# Patient Record
Sex: FEMALE | Race: BLACK OR AFRICAN AMERICAN | NOT HISPANIC OR LATINO | Employment: FULL TIME | RURAL
[De-identification: names, ages, dates, MRNs, and addresses within clinical notes are randomized per-mention and may not be internally consistent; named-entity substitution may affect disease eponyms.]

---

## 2021-03-06 ENCOUNTER — HISTORICAL (OUTPATIENT)
Dept: ADMINISTRATIVE | Facility: HOSPITAL | Age: 66
End: 2021-03-06

## 2021-03-18 ENCOUNTER — HOSPITAL ENCOUNTER (OUTPATIENT)
Dept: RADIOLOGY | Facility: HOSPITAL | Age: 66
Discharge: HOME | End: 2021-03-18
Attending: OBSTETRICS & GYNECOLOGY
Payer: COMMERCIAL

## 2021-03-18 DIAGNOSIS — Z12.31 BREAST CANCER SCREENING BY MAMMOGRAM: ICD-10-CM

## 2021-03-18 PROCEDURE — 77067 SCR MAMMO BI INCL CAD: CPT | Mod: TC

## 2022-02-16 ENCOUNTER — HOSPITAL ENCOUNTER (OUTPATIENT)
Dept: RADIOLOGY | Facility: HOSPITAL | Age: 67
Discharge: HOME | End: 2022-02-16
Attending: ORTHOPAEDIC SURGERY
Payer: COMMERCIAL

## 2022-02-16 DIAGNOSIS — M25.511 ACUTE PAIN OF RIGHT SHOULDER: ICD-10-CM

## 2022-02-16 DIAGNOSIS — M25.522 LEFT ELBOW PAIN: ICD-10-CM

## 2022-02-16 PROBLEM — M72.2 PLANTAR FASCIITIS, BILATERAL: Status: ACTIVE | Noted: 2022-02-16

## 2022-02-16 PROCEDURE — 73030 X-RAY EXAM OF SHOULDER: CPT | Mod: TC,RT

## 2022-02-16 PROCEDURE — 73070 X-RAY EXAM OF ELBOW: CPT | Mod: TC,LT

## 2022-03-23 PROBLEM — M75.22 BICEPS TENDINITIS ON LEFT: Status: ACTIVE | Noted: 2022-03-23

## 2022-03-23 PROBLEM — M75.41 IMPINGEMENT SYNDROME OF RIGHT SHOULDER: Status: ACTIVE | Noted: 2022-03-23

## 2022-05-03 ENCOUNTER — HOSPITAL ENCOUNTER (OUTPATIENT)
Facility: HOSPITAL | Age: 67
Discharge: HOME | End: 2022-05-03
Attending: ORTHOPAEDIC SURGERY | Admitting: ORTHOPAEDIC SURGERY
Payer: COMMERCIAL

## 2022-05-03 ENCOUNTER — ANESTHESIA EVENT (OUTPATIENT)
Dept: SURGERY | Facility: HOSPITAL | Age: 67
End: 2022-05-03
Payer: COMMERCIAL

## 2022-05-03 ENCOUNTER — ANESTHESIA (OUTPATIENT)
Dept: SURGERY | Facility: HOSPITAL | Age: 67
End: 2022-05-03
Payer: COMMERCIAL

## 2022-05-03 VITALS
OXYGEN SATURATION: 91 % | SYSTOLIC BLOOD PRESSURE: 167 MMHG | DIASTOLIC BLOOD PRESSURE: 60 MMHG | HEIGHT: 61 IN | WEIGHT: 200.38 LBS | RESPIRATION RATE: 16 BRPM | BODY MASS INDEX: 37.83 KG/M2 | HEART RATE: 76 BPM | TEMPERATURE: 98 F

## 2022-05-03 DIAGNOSIS — M75.41 IMPINGEMENT SYNDROME OF RIGHT SHOULDER: Primary | ICD-10-CM

## 2022-05-03 PROCEDURE — 63600175 PHARM REV CODE 636 W HCPCS: Performed by: ORTHOPAEDIC SURGERY

## 2022-05-03 PROCEDURE — 71000033 HC RECOVERY, INTIAL HOUR: Performed by: ORTHOPAEDIC SURGERY

## 2022-05-03 PROCEDURE — 25000003 PHARM REV CODE 250

## 2022-05-03 PROCEDURE — 36000711: Performed by: ORTHOPAEDIC SURGERY

## 2022-05-03 PROCEDURE — 25000003 PHARM REV CODE 250: Performed by: ORTHOPAEDIC SURGERY

## 2022-05-03 PROCEDURE — 36000710: Performed by: ORTHOPAEDIC SURGERY

## 2022-05-03 PROCEDURE — 37000008 HC ANESTHESIA 1ST 15 MINUTES: Performed by: ORTHOPAEDIC SURGERY

## 2022-05-03 PROCEDURE — 63600175 PHARM REV CODE 636 W HCPCS: Performed by: ANESTHESIOLOGY

## 2022-05-03 PROCEDURE — 64415 NJX AA&/STRD BRCH PLXS IMG: CPT | Mod: XU,RT,, | Performed by: ANESTHESIOLOGY

## 2022-05-03 PROCEDURE — D9220A PRA ANESTHESIA: Mod: ANES,,, | Performed by: ANESTHESIOLOGY

## 2022-05-03 PROCEDURE — D9220A PRA ANESTHESIA: ICD-10-PCS | Mod: ANES,,, | Performed by: ANESTHESIOLOGY

## 2022-05-03 PROCEDURE — 37000009 HC ANESTHESIA EA ADD 15 MINS: Performed by: ORTHOPAEDIC SURGERY

## 2022-05-03 PROCEDURE — 63600175 PHARM REV CODE 636 W HCPCS

## 2022-05-03 PROCEDURE — 27100168 OPTIME MED/SURG SUP & DEVICES NON-STERILE SUPPLY: Performed by: ORTHOPAEDIC SURGERY

## 2022-05-03 PROCEDURE — C1713 ANCHOR/SCREW BN/BN,TIS/BN: HCPCS | Performed by: ORTHOPAEDIC SURGERY

## 2022-05-03 PROCEDURE — 71000015 HC POSTOP RECOV 1ST HR: Performed by: ORTHOPAEDIC SURGERY

## 2022-05-03 PROCEDURE — 64415 PERIPHERAL BLOCK: ICD-10-PCS | Mod: XU,RT,, | Performed by: ANESTHESIOLOGY

## 2022-05-03 PROCEDURE — D9220A PRA ANESTHESIA: ICD-10-PCS | Mod: CRNA,,,

## 2022-05-03 PROCEDURE — D9220A PRA ANESTHESIA: Mod: CRNA,,,

## 2022-05-03 PROCEDURE — 71000016 HC POSTOP RECOV ADDL HR: Performed by: ORTHOPAEDIC SURGERY

## 2022-05-03 PROCEDURE — 27201423 OPTIME MED/SURG SUP & DEVICES STERILE SUPPLY: Performed by: ORTHOPAEDIC SURGERY

## 2022-05-03 DEVICE — IMP SUTURE ANCHOR SWVLKC CLD 4.75X19.1MM: Type: IMPLANTABLE DEVICE | Site: SHOULDER | Status: FUNCTIONAL

## 2022-05-03 RX ORDER — MORPHINE SULFATE 8 MG/ML
4 INJECTION INTRAMUSCULAR; INTRAVENOUS; SUBCUTANEOUS EVERY 5 MIN PRN
Status: DISCONTINUED | OUTPATIENT
Start: 2022-05-03 | End: 2022-05-03 | Stop reason: HOSPADM

## 2022-05-03 RX ORDER — CEFAZOLIN SODIUM 2 G/50ML
2 SOLUTION INTRAVENOUS
Status: DISCONTINUED | OUTPATIENT
Start: 2022-05-03 | End: 2022-05-03 | Stop reason: HOSPADM

## 2022-05-03 RX ORDER — ONDANSETRON 2 MG/ML
4 INJECTION INTRAMUSCULAR; INTRAVENOUS DAILY PRN
Status: DISCONTINUED | OUTPATIENT
Start: 2022-05-03 | End: 2022-05-03 | Stop reason: HOSPADM

## 2022-05-03 RX ORDER — LIDOCAINE HYDROCHLORIDE 10 MG/ML
1 INJECTION, SOLUTION EPIDURAL; INFILTRATION; INTRACAUDAL; PERINEURAL ONCE
Status: CANCELLED | OUTPATIENT
Start: 2022-05-03 | End: 2022-05-03

## 2022-05-03 RX ORDER — DIPHENHYDRAMINE HYDROCHLORIDE 50 MG/ML
25 INJECTION INTRAMUSCULAR; INTRAVENOUS EVERY 6 HOURS PRN
Status: DISCONTINUED | OUTPATIENT
Start: 2022-05-03 | End: 2022-05-03 | Stop reason: HOSPADM

## 2022-05-03 RX ORDER — ROCURONIUM BROMIDE 10 MG/ML
INJECTION, SOLUTION INTRAVENOUS
Status: DISCONTINUED | OUTPATIENT
Start: 2022-05-03 | End: 2022-05-03

## 2022-05-03 RX ORDER — DEXAMETHASONE SODIUM PHOSPHATE 4 MG/ML
INJECTION, SOLUTION INTRA-ARTICULAR; INTRALESIONAL; INTRAMUSCULAR; INTRAVENOUS; SOFT TISSUE
Status: DISCONTINUED | OUTPATIENT
Start: 2022-05-03 | End: 2022-05-03

## 2022-05-03 RX ORDER — KETOROLAC TROMETHAMINE 30 MG/ML
INJECTION, SOLUTION INTRAMUSCULAR; INTRAVENOUS
Status: DISCONTINUED | OUTPATIENT
Start: 2022-05-03 | End: 2022-05-03

## 2022-05-03 RX ORDER — ONDANSETRON 2 MG/ML
INJECTION INTRAMUSCULAR; INTRAVENOUS
Status: DISCONTINUED | OUTPATIENT
Start: 2022-05-03 | End: 2022-05-03

## 2022-05-03 RX ORDER — PROMETHAZINE HYDROCHLORIDE 25 MG/1
25 TABLET ORAL EVERY 6 HOURS PRN
Status: DISCONTINUED | OUTPATIENT
Start: 2022-05-03 | End: 2022-05-03 | Stop reason: HOSPADM

## 2022-05-03 RX ORDER — FENTANYL CITRATE 50 UG/ML
INJECTION, SOLUTION INTRAMUSCULAR; INTRAVENOUS
Status: DISCONTINUED | OUTPATIENT
Start: 2022-05-03 | End: 2022-05-03

## 2022-05-03 RX ORDER — EPHEDRINE SULFATE 50 MG/ML
INJECTION, SOLUTION INTRAVENOUS
Status: DISCONTINUED | OUTPATIENT
Start: 2022-05-03 | End: 2022-05-03

## 2022-05-03 RX ORDER — NIFEDIPINE 30 MG/1
30 TABLET, EXTENDED RELEASE ORAL DAILY
COMMUNITY

## 2022-05-03 RX ORDER — SODIUM CHLORIDE, SODIUM LACTATE, POTASSIUM CHLORIDE, CALCIUM CHLORIDE 600; 310; 30; 20 MG/100ML; MG/100ML; MG/100ML; MG/100ML
125 INJECTION, SOLUTION INTRAVENOUS CONTINUOUS
Status: DISCONTINUED | OUTPATIENT
Start: 2022-05-03 | End: 2022-05-03 | Stop reason: HOSPADM

## 2022-05-03 RX ORDER — EPINEPHRINE CONVENIENCE KIT 1 MG/ML(1)
KIT INTRAMUSCULAR; SUBCUTANEOUS
Status: DISCONTINUED | OUTPATIENT
Start: 2022-05-03 | End: 2022-05-03 | Stop reason: HOSPADM

## 2022-05-03 RX ORDER — LIDOCAINE HYDROCHLORIDE 20 MG/ML
INJECTION, SOLUTION EPIDURAL; INFILTRATION; INTRACAUDAL; PERINEURAL
Status: DISCONTINUED | OUTPATIENT
Start: 2022-05-03 | End: 2022-05-03

## 2022-05-03 RX ORDER — SODIUM CHLORIDE 9 MG/ML
INJECTION, SOLUTION INTRAVENOUS CONTINUOUS
Status: DISCONTINUED | OUTPATIENT
Start: 2022-05-03 | End: 2022-05-03 | Stop reason: HOSPADM

## 2022-05-03 RX ORDER — HYDROMORPHONE HYDROCHLORIDE 2 MG/ML
0.5 INJECTION, SOLUTION INTRAMUSCULAR; INTRAVENOUS; SUBCUTANEOUS EVERY 5 MIN PRN
Status: DISCONTINUED | OUTPATIENT
Start: 2022-05-03 | End: 2022-05-03 | Stop reason: HOSPADM

## 2022-05-03 RX ORDER — ROPIVACAINE HYDROCHLORIDE 7.5 MG/ML
INJECTION, SOLUTION EPIDURAL; PERINEURAL
Status: COMPLETED | OUTPATIENT
Start: 2022-05-03 | End: 2022-05-03

## 2022-05-03 RX ORDER — ONDANSETRON 4 MG/1
8 TABLET, ORALLY DISINTEGRATING ORAL EVERY 8 HOURS PRN
Status: DISCONTINUED | OUTPATIENT
Start: 2022-05-03 | End: 2022-05-03 | Stop reason: HOSPADM

## 2022-05-03 RX ORDER — GLYCOPYRROLATE 0.2 MG/ML
INJECTION INTRAMUSCULAR; INTRAVENOUS
Status: DISCONTINUED | OUTPATIENT
Start: 2022-05-03 | End: 2022-05-03

## 2022-05-03 RX ORDER — BUPIVACAINE HYDROCHLORIDE 2.5 MG/ML
INJECTION, SOLUTION EPIDURAL; INFILTRATION; INTRACAUDAL
Status: DISCONTINUED | OUTPATIENT
Start: 2022-05-03 | End: 2022-05-03 | Stop reason: HOSPADM

## 2022-05-03 RX ORDER — NEOSTIGMINE METHYLSULFATE 1 MG/ML
INJECTION, SOLUTION INTRAVENOUS
Status: DISCONTINUED | OUTPATIENT
Start: 2022-05-03 | End: 2022-05-03

## 2022-05-03 RX ORDER — MEPERIDINE HYDROCHLORIDE 25 MG/ML
25 INJECTION INTRAMUSCULAR; INTRAVENOUS; SUBCUTANEOUS EVERY 10 MIN PRN
Status: DISCONTINUED | OUTPATIENT
Start: 2022-05-03 | End: 2022-05-03 | Stop reason: HOSPADM

## 2022-05-03 RX ORDER — PENTAZOCINE HYDROCHLORIDE AND NALOXONE HYDROCHLORIDE 50; .5 MG/1; MG/1
1 TABLET ORAL EVERY 6 HOURS PRN
Status: DISCONTINUED | OUTPATIENT
Start: 2022-05-03 | End: 2022-05-03 | Stop reason: HOSPADM

## 2022-05-03 RX ORDER — PROPOFOL 10 MG/ML
VIAL (ML) INTRAVENOUS
Status: DISCONTINUED | OUTPATIENT
Start: 2022-05-03 | End: 2022-05-03

## 2022-05-03 RX ORDER — SODIUM CHLORIDE, SODIUM LACTATE, POTASSIUM CHLORIDE, CALCIUM CHLORIDE 600; 310; 30; 20 MG/100ML; MG/100ML; MG/100ML; MG/100ML
INJECTION, SOLUTION INTRAVENOUS CONTINUOUS
Status: CANCELLED | OUTPATIENT
Start: 2022-05-03

## 2022-05-03 RX ORDER — MIDAZOLAM HYDROCHLORIDE 1 MG/ML
INJECTION INTRAMUSCULAR; INTRAVENOUS
Status: DISCONTINUED | OUTPATIENT
Start: 2022-05-03 | End: 2022-05-03

## 2022-05-03 RX ORDER — CEFAZOLIN SODIUM 1 G/3ML
INJECTION, POWDER, FOR SOLUTION INTRAMUSCULAR; INTRAVENOUS
Status: DISCONTINUED | OUTPATIENT
Start: 2022-05-03 | End: 2022-05-03

## 2022-05-03 RX ORDER — PHENYLEPHRINE HYDROCHLORIDE 10 MG/ML
INJECTION INTRAVENOUS
Status: DISCONTINUED | OUTPATIENT
Start: 2022-05-03 | End: 2022-05-03

## 2022-05-03 RX ADMIN — GLYCOPYRROLATE 0.8 MG: 0.2 INJECTION INTRAMUSCULAR; INTRAVENOUS at 12:05

## 2022-05-03 RX ADMIN — PHENYLEPHRINE HYDROCHLORIDE 100 MCG: 10 INJECTION INTRAVENOUS at 10:05

## 2022-05-03 RX ADMIN — ROCURONIUM BROMIDE 50 MG: 10 INJECTION, SOLUTION INTRAVENOUS at 10:05

## 2022-05-03 RX ADMIN — ROPIVACAINE HYDROCHLORIDE 30 ML: 7.5 INJECTION, SOLUTION EPIDURAL; PERINEURAL at 10:05

## 2022-05-03 RX ADMIN — KETOROLAC TROMETHAMINE 60 MG: 30 INJECTION, SOLUTION INTRAMUSCULAR at 10:05

## 2022-05-03 RX ADMIN — SODIUM CHLORIDE: 9 INJECTION, SOLUTION INTRAVENOUS at 08:05

## 2022-05-03 RX ADMIN — PROPOFOL 150 MG: 10 INJECTION, EMULSION INTRAVENOUS at 10:05

## 2022-05-03 RX ADMIN — NEOSTIGMINE METHYLSULFATE 4 MG: 1 INJECTION INTRAVENOUS at 12:05

## 2022-05-03 RX ADMIN — FENTANYL CITRATE 100 MCG: 50 INJECTION INTRAMUSCULAR; INTRAVENOUS at 10:05

## 2022-05-03 RX ADMIN — DEXAMETHASONE SODIUM PHOSPHATE 8 MG: 4 INJECTION, SOLUTION INTRA-ARTICULAR; INTRALESIONAL; INTRAMUSCULAR; INTRAVENOUS; SOFT TISSUE at 10:05

## 2022-05-03 RX ADMIN — EPHEDRINE SULFATE 25 MG: 50 INJECTION INTRAVENOUS at 11:05

## 2022-05-03 RX ADMIN — LIDOCAINE HYDROCHLORIDE 100 MG: 20 INJECTION, SOLUTION INTRAVENOUS at 10:05

## 2022-05-03 RX ADMIN — ONDANSETRON 4 MG: 2 INJECTION INTRAMUSCULAR; INTRAVENOUS at 10:05

## 2022-05-03 RX ADMIN — SODIUM CHLORIDE: 9 INJECTION, SOLUTION INTRAVENOUS at 10:05

## 2022-05-03 RX ADMIN — CEFAZOLIN 2 G: 1 INJECTION, POWDER, FOR SOLUTION INTRAMUSCULAR; INTRAVENOUS; PARENTERAL at 10:05

## 2022-05-03 RX ADMIN — MIDAZOLAM 2 MG: 1 INJECTION INTRAMUSCULAR; INTRAVENOUS at 10:05

## 2022-05-03 NOTE — H&P
[facility] Orthopedic Periop Services  Orthopedics  H&P    Patient Name: Isaiah Neil  MRN: [id number]  Admission Date: (Not on file)  Primary Care Provider: Chrissy Powell DO (Inactive)    Patient information was obtained from patient and ER records.     Subjective:     Principal Problem:Impingement syndrome of right shoulder    Chief Complaint: No chief complaint on file.       HPI: Chief complaint:  Right shoulder pain  History:    Isaiah Neil is a 66 y.o. female seen  for recheck of her right shoulder pain.  She underwent MRI examination of the right shoulder on 03/10/2022.  MRI suggests impingement syndrome with AC joint DJD and partial-thickness tearing of the supraspinatus and infraspinatus tendons.  Discussed the option for arthroscopic subacromial decompression as an outpatient.  Procedure was shown with models.  The benefits and risks of surgery outlined to include but not limited to bleeding, infection, damage to blood vessels and nerves, need for further surgery, other risks and complications including even death and the patient wished to proceed.   Impression:  Impingement syndrome-right shoulder  Plan:  Right shoulder arthroscopic subacromial decompression.  Outpatient general/regional block anesthesia discussed.      No new subjective & objective note has been filed under this hospital service since the last note was generated.    Assessment/Plan:     No notes have been filed under this hospital service.  Service: Orthopedic Surgery      Davis Dunn MD  Orthopedics  [facility] Orthopedic Periop Services

## 2022-05-03 NOTE — DISCHARGE SUMMARY
[facility] - Orthopedic Periop Services  Discharge Note  Short Stay    Procedure(s) (LRB):  ARTHROSCOPY, SHOULDER (Right)    OUTCOME: Patient tolerated treatment/procedure well without complication and is now ready for discharge.    DISPOSITION: Home or Self Care    FINAL DIAGNOSIS:  Impingement syndrome of right shoulder    FOLLOWUP: In clinic    DISCHARGE INSTRUCTIONS:    Discharge Procedure Orders   Diet general     Keep surgical extremity elevated     Ice to affected area   Order Comments: using barrier between ice and skin (specify duration&frequency)     Remove dressing in 72 hours   Order Comments: Keep dressing in place for 72 hours     Change dressing (specify)   Order Comments: Dressing change: one time per day beginning 72 hours post op.     Call MD for:  temperature >100.4     Call MD for:  persistent nausea and vomiting     Call MD for:  severe uncontrolled pain     Call MD for:  difficulty breathing, headache or visual disturbances     Call MD for:  redness, tenderness, or signs of infection (pain, swelling, redness, odor or green/yellow discharge around incision site)     Call MD for:  hives     Call MD for:  persistent dizziness or light-headedness     Call MD for:  extreme fatigue     Activity as tolerated     Shower on day dressing removed (No bath)     Weight bearing as tolerated        TIME SPENT ON DISCHARGE: 15 minutes

## 2022-05-03 NOTE — HPI
Chief complaint:  Right shoulder pain  History:    Isaiah Neil is a 66 y.o. female seen  for recheck of her right shoulder pain.  She underwent MRI examination of the right shoulder on 03/10/2022.  MRI suggests impingement syndrome with AC joint DJD and partial-thickness tearing of the supraspinatus and infraspinatus tendons.  Discussed the option for arthroscopic subacromial decompression as an outpatient.  Procedure was shown with models.  The benefits and risks of surgery outlined to include but not limited to bleeding, infection, damage to blood vessels and nerves, need for further surgery, other risks and complications including even death and the patient wished to proceed.   Impression:  Impingement syndrome-right shoulder  Plan:  Right shoulder arthroscopic subacromial decompression.  Outpatient general/regional block anesthesia discussed.

## 2022-05-03 NOTE — ANESTHESIA PREPROCEDURE EVALUATION
05/03/2022  Isaiah Neil is a 66 y.o., female.      Pre-op Assessment    I have reviewed the Patient Summary Reports.     I have reviewed the Nursing Notes. I have reviewed the NPO Status.   I have reviewed the Medications.     Review of Systems  Anesthesia Hx:  No problems with previous Anesthesia    Social:  Non-Smoker, No Alcohol Use    Hematology/Oncology:  Hematology Normal   Oncology Normal     EENT/Dental:EENT/Dental Normal   Cardiovascular:   Hypertension Dysrhythmias atrial fibrillation hyperlipidemia    Pulmonary:  Pulmonary Normal    Renal/:  Renal/ Normal     Hepatic/GI:  Hepatic/GI Normal    Musculoskeletal:  Musculoskeletal Normal    Neurological:   CVA    Endocrine:   Hypothyroidism    Dermatological:  Skin Normal    Psych:  Psychiatric Normal           Physical Exam  General: Well nourished    Airway:  Mallampati: III / III  Mouth Opening: Normal  TM Distance: > 6 cm  Tongue: Normal  Neck ROM: Normal ROM    Chest/Lungs:  Clear to auscultation, Normal Respiratory Rate    Heart:  Rate: Normal  Rhythm: Regular Rhythm        Anesthesia Plan  Type of Anesthesia, risks & benefits discussed:    Anesthesia Type: Gen ETT  Intra-op Monitoring Plan: Standard ASA Monitors  Post Op Pain Control Plan: multimodal analgesia  Induction:  IV  Informed Consent: Informed consent signed with the Patient and all parties understand the risks and agree with anesthesia plan.  All questions answered. Patient consented to blood products? Yes  ASA Score: 3  Day of Surgery Review of History & Physical: H&P Update referred to the surgeon/provider.I have interviewed and examined the patient. I have reviewed the patient's H&P dated: There are no significant changes. H&P completed by Anesthesiologist.    Ready For Surgery From Anesthesia Perspective.     .

## 2022-05-03 NOTE — ANESTHESIA PROCEDURE NOTES
Intubation    Date/Time: 5/3/2022 10:45 AM  Performed by: Modesto Quiñones II, CRNA  Authorized by: Ralph Taylor MD     Intubation:     Induction:  Intravenous    Intubated:  Postinduction    Mask Ventilation:  Easy with oral airway    Attempts:  1    Attempted By:  CRNA    Method of Intubation:  Direct    Blade:  Yusuf 3    Laryngeal View Grade: Grade IIA - cords partially seen      Difficult Airway Encountered?: No      Complications:  None    Airway Device:  Oral endotracheal tube    Airway Device Size:  7.0    Style/Cuff Inflation:  Cuffed    Inflation Amount (mL):  7    Tube secured:  21    Secured at:  The lips    Placement Verified By:  Capnometry    Complicating Factors:  None    Findings Post-Intubation:  BS equal bilateral and atraumatic/condition of teeth unchanged

## 2022-05-03 NOTE — INTERVAL H&P NOTE
The patient has been examined and the H&P has been reviewed:    I concur with the findings and no changes have occurred since H&P was written.    Surgery risks, benefits and alternative options discussed and understood by patient/family.          Active Hospital Problems    Diagnosis  POA    *Impingement syndrome of right shoulder [M75.41]  Yes      Resolved Hospital Problems   No resolved problems to display.

## 2022-05-03 NOTE — ANESTHESIA PROCEDURE NOTES
Peripheral Block    Patient location during procedure: OR   Block not for primary anesthetic.  Reason for block: at surgeon's request and post-op pain management   Post-op Pain Location: rt shoulder scope   Start time: 5/3/2022 10:30 AM  Timeout: 5/3/2022 10:30 AM   End time: 5/3/2022 10:37 AM    Staffing  Authorizing Provider: Ralph Taylor MD  Performing Provider: Ralph Taylor MD    Preanesthetic Checklist  Completed: patient identified, IV checked, site marked, risks and benefits discussed, surgical consent, monitors and equipment checked, pre-op evaluation and timeout performed  Peripheral Block  Patient position: sitting  Prep: ChloraPrep  Patient monitoring: heart rate, continuous pulse ox, continuous capnometry, frequent blood pressure checks and cardiac monitor  Block type: interscalene  Laterality: right  Injection technique: single shot  Needle  Needle type: Stimuplex   Needle gauge: 20 G  Needle length: 2 in  Needle localization: nerve stimulator and ultrasound guidance   -ultrasound image captured on disc.  Assessment  Injection assessment: negative aspiration, negative parasthesia and local visualized surrounding nerve  Paresthesia pain: none  Heart rate change: no  Slow fractionated injection: yes  Pain Tolerance: comfortable throughout block  Medications:    Medications: ROPIvacaine (NAROPIN) injection 0.75% - Perineural   30 mL - 5/3/2022 10:34:00 AM

## 2022-05-03 NOTE — BRIEF OP NOTE
[facility] - Orthopedic Periop Services  Brief Operative Note    Surgery Date: 5/3/2022     Surgeon(s) and Role:     * Davis Dunn MD - Primary    Assisting Surgeon: None    Pre-op Diagnosis:  Impingement syndrome of right shoulder [M75.41]    Post-op Diagnosis:  Post-Op Diagnosis Codes:     * Impingement syndrome of right shoulder [M75.41]    Procedure(s) (LRB):  ARTHROSCOPY, SHOULDER (Right)    Anesthesia:  General/block    Description of the findings of the procedure(s): See Op Note     Estimated Blood Loss: * No values recorded between 5/3/2022 12:00 PM and 5/3/2022 12:46 PM *5cc         Specimens:   Specimen (24h ago, onward)            None            Discharge Note    OUTCOME: Patient tolerated treatment/procedure well without complication and is now ready for discharge.    DISPOSITION: Home or Self Care    FINAL DIAGNOSIS:  Impingement syndrome of right shoulder    FOLLOWUP: In clinic    DISCHARGE INSTRUCTIONS:    Discharge Procedure Orders   Diet general     Keep surgical extremity elevated     Ice to affected area   Order Comments: using barrier between ice and skin (specify duration&frequency)     Remove dressing in 72 hours   Order Comments: Keep dressing in place for 72 hours     Change dressing (specify)   Order Comments: Dressing change: one time per day beginning 72 hours post op.     Call MD for:  temperature >100.4     Call MD for:  persistent nausea and vomiting     Call MD for:  severe uncontrolled pain     Call MD for:  difficulty breathing, headache or visual disturbances     Call MD for:  redness, tenderness, or signs of infection (pain, swelling, redness, odor or green/yellow discharge around incision site)     Call MD for:  hives     Call MD for:  persistent dizziness or light-headedness     Call MD for:  extreme fatigue     Activity as tolerated     Shower on day dressing removed (No bath)     Weight bearing as tolerated

## 2022-05-03 NOTE — H&P
[facility] - Orthopedic Periop Services  Orthopedics  H&P    Patient Name: Isaiah Neil  MRN: [id number]  Admission Date: (Not on file)  Primary Care Provider: Chrissy Powell DO (Inactive)    Patient information was obtained from patient and ER records.     Subjective:     Principal Problem:Impingement syndrome of right shoulder    Chief Complaint: No chief complaint on file.       HPI: Chief complaint:  Right shoulder pain  History:    Isaiah Neil is a 66 y.o. female seen  for recheck of her right shoulder pain.  She underwent MRI examination of the right shoulder on 03/10/2022.  MRI suggests impingement syndrome with AC joint DJD and partial-thickness tearing of the supraspinatus and infraspinatus tendons.  Discussed the option for arthroscopic subacromial decompression as an outpatient.  Procedure was shown with models.  The benefits and risks of surgery outlined to include but not limited to bleeding, infection, damage to blood vessels and nerves, need for further surgery, other risks and complications including even death and the patient wished to proceed.   Impression:  Impingement syndrome-right shoulder  Plan:  Right shoulder arthroscopic subacromial decompression.  Outpatient general/regional block anesthesia discussed.      History reviewed. No pertinent past medical history.    History reviewed. No pertinent surgical history.    Review of patient's allergies indicates:   Allergen Reactions    Codeine phosphate Nausea Only       No current facility-administered medications for this encounter.     Current Outpatient Medications   Medication Sig    amLODIPine (NORVASC) 10 MG tablet     aspirin 325 MG tablet 1 tablet    atorvastatin (LIPITOR) 40 MG tablet     EUTHYROX 112 mcg tablet     metoprolol succinate (TOPROL-XL) 50 MG 24 hr tablet      Family History       Problem Relation (Age of Onset)    Breast cancer Maternal Aunt          Tobacco Use    Smoking status: Not on file    Smokeless  tobacco: Not on file   Substance and Sexual Activity    Alcohol use: Not on file    Drug use: Not on file    Sexual activity: Not on file     Review of Systems   Constitutional: Negative.   Objective:     Vital Signs (Most Recent):    Vital Signs (24h Range):  BP: ()/()   Arterial Line BP: ()/()            There is no height or weight on file to calculate BMI.    No intake or output data in the 24 hours ending 05/02/22 2222    General    Constitutional: She is oriented to person, place, and time. She appears well-developed and well-nourished.   HENT:   Head: Normocephalic and atraumatic.   Eyes: EOM are normal. Pupils are equal, round, and reactive to light.   Neck: Neck supple.   Cardiovascular:  Normal rate, regular rhythm and normal heart sounds.            Pulmonary/Chest: Effort normal and breath sounds normal.   Abdominal: Soft. Bowel sounds are normal.   Neurological: She is alert and oriented to person, place, and time.   Psychiatric: She has a normal mood and affect. Her behavior is normal.         Right Shoulder Exam     Tenderness   The patient is tender to palpation of the acromioclavicular joint, greater tuberosity and acromion.    Range of Motion   Active abduction:  normal   Passive abduction:  normal   Extension:  normal   Forward Flexion:  normal   Adduction: normal    Tests & Signs   Impingement: positive  Rotator Cuff Painful Arc/Range: moderate    Left Shoulder Exam   Left shoulder exam is normal.       Muscle Strength   Right Upper Extremity   Shoulder Abduction: 4/5   Shoulder Internal Rotation: 5/5   Shoulder External Rotation: 5/5   Supraspinatus: 4/5   Subscapularis: 5/5   Biceps: 5/5     Vascular Exam     Right Pulses      Radial:                    2+      Capillary Refill  Right Hand: normal capillary refill        Significant Labs: All pertinent labs within the past 24 hours have been reviewed.    Significant Imaging: I have reviewed all pertinent imaging  results/findings.    Assessment/Plan:     No notes have been filed under this hospital service.  Service: Orthopedic Surgery      Davis Dunn MD  Orthopedics  [facility] - Orthopedic Periop Services

## 2022-05-03 NOTE — TRANSFER OF CARE
"Anesthesia Transfer of Care Note    Patient: Isaiah Neil    Procedure(s) Performed: Procedure(s) (LRB):  ARTHROSCOPY, SHOULDER (Right)    Patient location: PACU    Anesthesia Type: general and regional    Transport from OR: Transported from OR on 6-10 L/min O2 by face mask with adequate spontaneous ventilation    Post pain: adequate analgesia    Post assessment: no apparent anesthetic complications    Post vital signs: stable    Level of consciousness: responds to stimulation and sedated    Nausea/Vomiting: no nausea/vomiting    Complications: none    Transfer of care protocol was followed      Last vitals:   Visit Vitals  BP (!) 174/70 (BP Location: Left arm, Patient Position: Lying)   Pulse 86   Temp 36.4 °C (97.6 °F) (Oral)   Resp 20   Ht 5' 1" (1.549 m)   Wt 90.9 kg (200 lb 6.4 oz)   SpO2 97%   Breastfeeding No   BMI 37.87 kg/m²     "

## 2022-05-03 NOTE — OP NOTE
[facility] - Orthopedic Periop Services  Surgery Department  Operative Note    SUMMARY     Date of Procedure: 5/3/2022     Procedure: Procedure(s) (LRB):  ARTHROSCOPY, SHOULDER (Right)     Surgeon(s) and Role:     * Davis Dunn MD - Primary    Assisting Surgeon: None    Pre-Operative Diagnosis: Impingement syndrome of right shoulder [M75.41]    Post-Operative Diagnosis: Post-Op Diagnosis Codes:     * Impingement syndrome of right shoulder [M75.41]    Anesthesia:  General/block    Technical Procedures Used:   Right shoulder arthroscopy                        DEPARTMENT OF ORTHOPEDIC SURGERY                OPERATIVE REPORT     NAME:  Isaiah Neil  MRN: [id number]     DATE OF SURGERY:  5/3/2022    PREOPERATIVE DIAGNOSIS: right shoulder internal derangement       POSTOPERATIVE DIAGNOSIS:  Degenerative labral pathology, AC joint DJD with impingement, full-thickness supraspinatus rotator cuff tear-right shoulder    ANESTHESIA:  General/ Regional block    PROCEDURE:  Examination under anesthesia, arthroscopy with extensive debridement, bursectomy, coracoacromial ligament resection, Robert distal clavicle resection (6-8 mm), acromioplasty - right shoulder      PROCEDURE IN DETAIL:  The patient was taken to the operating room and placed in the supine position.  After adequate level of general anesthesia had been achieved (see anesthesia note) patients right shoulder was examined.  right shoulder normal contour.  There is full passive range of motion of the shoulder without instability signs.  right shoulder and upper extremity was scrubbed with Betadine and draped in sterile fashion.  The operation was begun by inflating the joint with sterile saline through a posterior approach using an 18 gauge spinal needle.  Now a linear skin incision was made over the anterior aspect of the right shoulder for introduction of the blunt arthroscopy cannula.  Intraarticular positioning was confirmed with the arthroscopy camera. The  anterior portal was established though an anterior incision made lateral to the coracoid process. The joint was entered through a trans-subscapularis muscle approach.  Inspection of glenohumeral joint revealed normal cartilaginous surfaces.  There was some degenerative tearing of the anterior labrum.  This debrided with shaver and contoured with the radiofrequency Wand.  The biceps anchor was intact.  A full-thickness tear the spinatus tendon was identified.  The undersurface frayed tissue was debrided with a shaver.  Joint was irrigated antibiotic solution and arthroscope pins was withdrawn.  The blunt cannula redirected from the posterior portal superiorly into the subacromial space.  Anterior and lateral portals were established.  A bursectomy was performed the shaver.  Impingement of supraspinatus tendon was noted at the anterolateral corner the acromion process and beneath the AC joint.  There was a full-thickness slightly retracted tear of the supraspinatus tendon present.  The coracoacromial ligament was then taken down the radiofrequency Wand.  A Robert distal clavicle resection acromioplasty was performed with a barrel bur.  Good subacromial decompression was confirmed with multiple portal viewing.  Attention turned back to the rotator cuff tear.  Insertion site was abraded to punctate bleeding cortical bone with the shaver.  Arthre 2 mm diameter FiberTape was placed in the torn portion of the rotator cuff using the Arthrex fiber suture Passer.  The cuff was approximated in tensioned.  FiberWire tape suture was engaged with Arthrex bio SwiveLock.  A punch hole was made in the base the greater tuberosity.  The SwiveLock was imbedded in the greater tuberosity securing the rotator cuff tear.  Good approximation instability the cuff tear was confirmed.  The excess sutures removed.Now, the subacromial space was irrigated with saline solution and arthroscope withdrawn.  The stab wounds were reapproximated with  interrupted 4-0 suture.  The wounds dressed sterilely and arm sling applied.  The patient was taken to the recovery room in satisfactory condition.  ESTIMATED BLOOD LOSS:  5ccs.  The patient received Ancef antibiotic intravenously prior to the procedure.      Davis Dunn MD           Complications: No    Estimated Blood Loss (EBL): * No values recorded between 5/3/2022 12:00 PM and 5/3/2022 12:46 PM *           Implants:   Implant Name Type Inv. Item Serial No.  Lot No. LRB No. Used Action   IMP SUTURE ANCHOR SWVLKC CLD [id number] - [id number]  IMP SUTURE ANCHOR SWVLKC CLD 4.75X19.1MM  [employer] ([facility]) [id number] 1 Implanted       Specimens:   Specimen (24h ago, onward)            None                  Condition: Good    Disposition: PACU - hemodynamically stable.    Attestation: I was present and scrubbed for the entire procedure.

## 2022-05-03 NOTE — SUBJECTIVE & OBJECTIVE
History reviewed. No pertinent past medical history.    History reviewed. No pertinent surgical history.    Review of patient's allergies indicates:   Allergen Reactions    Codeine phosphate Nausea Only       No current facility-administered medications for this encounter.     Current Outpatient Medications   Medication Sig    amLODIPine (NORVASC) 10 MG tablet     aspirin 325 MG tablet 1 tablet    atorvastatin (LIPITOR) 40 MG tablet     EUTHYROX 112 mcg tablet     metoprolol succinate (TOPROL-XL) 50 MG 24 hr tablet      Family History       Problem Relation (Age of Onset)    Breast cancer Maternal Aunt          Tobacco Use    Smoking status: Not on file    Smokeless tobacco: Not on file   Substance and Sexual Activity    Alcohol use: Not on file    Drug use: Not on file    Sexual activity: Not on file     Review of Systems   Constitutional: Negative.   Objective:     Vital Signs (Most Recent):    Vital Signs (24h Range):  BP: ()/()   Arterial Line BP: ()/()            There is no height or weight on file to calculate BMI.    No intake or output data in the 24 hours ending 05/02/22 2372    General    Constitutional: She is oriented to person, place, and time. She appears well-developed and well-nourished.   HENT:   Head: Normocephalic and atraumatic.   Eyes: EOM are normal. Pupils are equal, round, and reactive to light.   Neck: Neck supple.   Cardiovascular:  Normal rate, regular rhythm and normal heart sounds.            Pulmonary/Chest: Effort normal and breath sounds normal.   Abdominal: Soft. Bowel sounds are normal.   Neurological: She is alert and oriented to person, place, and time.   Psychiatric: She has a normal mood and affect. Her behavior is normal.         Right Shoulder Exam     Tenderness   The patient is tender to palpation of the acromioclavicular joint, greater tuberosity and acromion.    Range of Motion   Active abduction:  normal   Passive abduction:  normal   Extension:  normal   Forward  Flexion:  normal   Adduction: normal    Tests & Signs   Impingement: positive  Rotator Cuff Painful Arc/Range: moderate    Left Shoulder Exam   Left shoulder exam is normal.       Muscle Strength   Right Upper Extremity   Shoulder Abduction: 4/5   Shoulder Internal Rotation: 5/5   Shoulder External Rotation: 5/5   Supraspinatus: 4/5   Subscapularis: 5/5   Biceps: 5/5     Vascular Exam     Right Pulses      Radial:                    2+      Capillary Refill  Right Hand: normal capillary refill        Significant Labs: All pertinent labs within the past 24 hours have been reviewed.    Significant Imaging: I have reviewed all pertinent imaging results/findings.

## 2022-05-04 NOTE — ANESTHESIA POSTPROCEDURE EVALUATION
Anesthesia Post Evaluation    Patient: Isaiah Neil    Procedure(s) Performed: Procedure(s) (LRB):  ARTHROSCOPY, SHOULDER (Right)    Final Anesthesia Type: general      Patient location during evaluation: PACU  Patient participation: Yes- Able to Participate  Level of consciousness: awake and sedated  Post-procedure vital signs: reviewed and stable  Pain management: adequate  Airway patency: patent    PONV status at discharge: No PONV  Anesthetic complications: no      Cardiovascular status: blood pressure returned to baseline  Respiratory status: unassisted  Hydration status: euvolemic  Follow-up not needed.          Vitals Value Taken Time   /60 05/03/22 1500   Temp 36.4 °C (97.6 °F) 05/03/22 1305   Pulse 86 05/03/22 1509   Resp 16 05/03/22 1500   SpO2 92 % 05/03/22 1509   Vitals shown include unvalidated device data.      Event Time   Out of Recovery 13:46:49         Pain/Tasia Score: Tasia Score: 8 (5/3/2022  1:45 PM)

## 2022-05-20 PROBLEM — Z98.890 S/P ROTATOR CUFF REPAIR: Status: ACTIVE | Noted: 2022-05-20

## 2022-06-06 PROBLEM — Z09 POSTOP CHECK: Status: ACTIVE | Noted: 2022-06-06

## 2022-06-20 ENCOUNTER — CLINICAL SUPPORT (OUTPATIENT)
Dept: REHABILITATION | Facility: HOSPITAL | Age: 67
End: 2022-06-20
Payer: COMMERCIAL

## 2022-06-20 DIAGNOSIS — Z47.89 ORTHOPEDIC AFTERCARE: ICD-10-CM

## 2022-06-20 DIAGNOSIS — Z98.890 S/P ROTATOR CUFF REPAIR: Primary | ICD-10-CM

## 2022-06-20 DIAGNOSIS — M25.611 DECREASED ROM OF RIGHT SHOULDER: ICD-10-CM

## 2022-06-20 DIAGNOSIS — M25.511 ACUTE PAIN OF RIGHT SHOULDER: ICD-10-CM

## 2022-06-20 PROCEDURE — 97161 PT EVAL LOW COMPLEX 20 MIN: CPT

## 2022-06-20 NOTE — PLAN OF CARE
[facility]   Physical Therapy Initial Evaluation    Name: Isaiah Neil  Clinic Number: [id number]    Therapy Diagnosis:   Encounter Diagnosis   Name Primary?    Orthopedic aftercare      Physician: Davis Dunn MD    Physician Orders: PT Eval and Treat   Medical Diagnosis from Referral: s/p right rotator cuff repair   Evaluation Date: 2022  Authorization Period Expiration: 2022  Plan of Care Expiration: 2022  Progress Note Due: 2022  Visit # / Visits authorized:     Time In: 1131 am  Time Out: 1158 am  Total Appointment Time (timed & untimed codes): 27 minutes    Precautions: Standard    Subjective   Date of onset: 5/3/2022  History of current condition - Isaiah reports: she has not been having a lot of pain - still a little tough getting comfortable to sleep at night - still requires assistance from her daughter with dressing and showering - able to drive - able to toilet independently     Medical History:   Past Medical History:   Diagnosis Date    Digestive disorder     Hypertension     Mixed hyperlipidemia     Stroke     Thyroid disease        Surgical History:   Isaiah Neil  has a past surgical history that includes Hysterectomy;  section; and Shoulder arthroscopy (Right, 5/3/2022).    Medications:   Isaiah has a current medication list which includes the following prescription(s): aspirin, atorvastatin, euthyrox, metoprolol succinate, and nifedipine.    Allergies:   Review of patient's allergies indicates:   Allergen Reactions    Azithromycin Nausea Only    Codeine phosphate Nausea Only    Lortab [hydrocodone-acetaminophen] Nausea Only    Sulfa (sulfonamide antibiotics) Nausea Only        Imaging, MRI studies, xrays    Prior Therapy: has been doing pendulums since last MD visit  Social History:  lives with their daughter  Occupation: LPN  Prior Level of Function: independent   Current Level of Function: still requires assistance with  activiites of daily living     Pain:  Current 0/10, worst 4/10, best 0/10   Location: right shoulder   Description: Aching, Throbbing, Sharp and Variable  Aggravating Factors: Laying, getting dressed, raising arm overhead, reaching behind head and back  Easing Factors: pain medication, ice and rest    Pts goals: full use of right arm    Objective     Posture: rounded shoulders yes, forward head no, increased kyphotic curve yes, decreased lordotic curve no  Clear cervical spine: Spurling's test negative    Range of motion  Motion Right  Left    Shoulder flexion 48 degrees  WITHIN FUNCTIONAL LIMITS   Shoulder extension NT WITHIN FUNCTIONAL LIMITS   Shoulder abduction NT WITHIN FUNCTIONAL LIMITS   Shoulder internal rotation greater trochanter WITHIN FUNCTIONAL LIMITS   Shoulder external rotation 20   degrees  WITHIN FUNCTIONAL LIMITS     Passive range of motion: right shoulder flexion 50-60 degrees, external rotation and internal rotation 35-40 degrees in supine with shoulder in 30-45 degrees of scaption    MANUAL MUSCLE TEST  Muscle Right  Left    Shoulder flexion NT MMT strength: 5/5   Shoulder extension NT MMT strength: 5/5   Shoulder abduction NT MMT strength: 5/5   Shoulder internal rotation NT MMT strength: 5/5   Shoulder external rotation NT MMT strength: 5/5     Functional ability:  Dressing: AMB PT LEVEL OF ASSISTANCE: min A  Driving: AMB PT LEVEL OF ASSISTANCE: independent  Overhead activity: AMB PT LEVEL OF ASSISTANCE: max A  Work/hobbies: AMB PT LEVEL OF ASSISTANCE: unable at this time      Clinical test:  - not performed secondary to patient is postop    Palpation:     Limitation/Restriction for FOTO Shoulder Survey    Therapist reviewed FOTO scores for Isaiah Neil on 6/20/2022.   FOTO documents entered into [employer] - see Media section.    Intake Score: 38         TREATMENT     Total Treatment time (time-based codes) separate from Evaluation: 6 minutes    Isaiah received the treatments listed  below:  Instructed in correct way to perform pendulums, seated scapular retraction and counter flexion slides with towel    Home Exercises and Patient Education Provided    Education provided:   - evaluation results, plan of care and home exercise program     Written Home Exercises Provided: yes.  Exercises were reviewed and Isaiah was able to demonstrate them prior to the end of the session.  Isaiah demonstrated good  understanding of the education provided.     See EMR under Patient Instructions for exercises provided 6/20/2022.    Assessment   Isaiah is a 66 y.o. female referred to outpatient Physical Therapy with a medical diagnosis of s/p right rotator cuff repair. Pt presents with typical postop symptoms of pain, decreased range of motion, poor scapular stability, decreased right shoulder strength and decreased functional use of right upper extremity. 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 She does not have much pain at rest but when she tries to raise her arm or move her shoulder much she gets some pain. She says it is still difficult trying to get comfortable to sleep. Her daughter still has to assist her with dressing and showering. She is driving independently. She was very guarded with attempts at manual stretching today despite multiple cues to relax. Isaiah will be 7 weeks postop tomorrow.    Pt prognosis is Good.   Pt will benefit from skilled outpatient Physical Therapy to address the deficits stated above and in the chart below, provide pt/family education, and to maximize pt's level of independence.     Plan of care discussed with patient: Yes  Pt's spiritual, cultural and educational needs considered and patient is agreeable to the plan of care and goals as stated below:     Anticipated Barriers for therapy: none      Goals:  Short Term Goals: 4 weeks  1. Patient will be independent with home exercise program.  2. Patient will have passive range of motion as follows - 120 degrees flexion, 45  degrees external rotation and internal rotation.  3. Patient will be independent with dressing and showering.  4. Patient will be able to sleep all night in bed without waking due to right shoulder.    Long Term Goals: 8 weeks  1. Patient will have active range of motion as follow - 140 degrees flexion, 60 degrees external rotation and functional internal rotation to T10 for reaching overhead, behind head and behind back for dressing, grooming and hygiene.  2. Patient will have 4+/5 strength right shoulder/upper extremity to perform household chores and work related tasks.  3. Patient will place 3# dumbbell on head height shelf without hiking or pain left shoulder.  4. 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      Plan   Plan of care Certification: 6/20/2022 to 8/12/2022.    Outpatient Physical Therapy 3 times weekly for 4 weeks, then 2 times weekly for 4 weeks to include the following interventions: Electrical Stimulation IFC/premod/hivolt as needed, Manual Therapy, Moist Heat/ Ice, Neuromuscular Re-ed, Patient Education, Therapeutic Activities, Therapeutic Exercise and Ultrasound.     PABLO BAEZ, PT

## 2022-06-22 ENCOUNTER — CLINICAL SUPPORT (OUTPATIENT)
Dept: REHABILITATION | Facility: HOSPITAL | Age: 67
End: 2022-06-22
Payer: COMMERCIAL

## 2022-06-22 DIAGNOSIS — M25.511 ACUTE PAIN OF RIGHT SHOULDER: ICD-10-CM

## 2022-06-22 DIAGNOSIS — M25.611 DECREASED ROM OF RIGHT SHOULDER: Primary | ICD-10-CM

## 2022-06-22 DIAGNOSIS — Z98.890 S/P ROTATOR CUFF REPAIR: ICD-10-CM

## 2022-06-22 PROCEDURE — 97140 MANUAL THERAPY 1/> REGIONS: CPT | Mod: CQ

## 2022-06-22 PROCEDURE — 97014 ELECTRIC STIMULATION THERAPY: CPT | Mod: CQ

## 2022-06-22 PROCEDURE — 97112 NEUROMUSCULAR REEDUCATION: CPT | Mod: CQ

## 2022-06-22 NOTE — PROGRESS NOTES
[facility]   Physical Therapy Treatment Note     Name: Isaiah Neil  Clinic Number: [id number]    Therapy Diagnosis:   Encounter Diagnoses   Name Primary?    Decreased ROM of right shoulder Yes    S/P rotator cuff repair     Acute pain of right shoulder      Physician: Davis Dunn MD    Visit Date: 6/22/2022    Physician Orders: PT Eval and Treat   Medical Diagnosis from Referral: s/p right rotator cuff repair   Evaluation Date: 6/20/2022  Authorization Period Expiration: 12/31/2022  Plan of Care Expiration: 8/12/2022  Progress Note Due: 7/20/2022  Visit # / Visits authorized: 2/ 100    PTA Visit #: 1/5     Time In: 2:36 pm  Time Out: 3:20 pm  Total Billable Time: 44 minutes    SUBJECTIVE     Pt reports: she is a little sore.  She was compliant with home exercise program.  Response to previous treatment: soreness  Functional change: n/a    Pain: 2/10  Location: right shoulder      OBJECTIVE     Objective Measures updated at progress report unless specified.   Case conference with Aura Mahmood PT, for initial PTA visit.     Treatment     Isaiah received the treatments listed below:      therapeutic exercises to develop strength, ROM and flexibility for 0 minutes including:    manual therapy techniques: Joint mobilizations, Manual traction, Myofacial release and Soft tissue Mobilization were applied to the: right shoulder for 15 minutes, including:  Myofascial release to upper traps, biceps, and deltoids  Passive range of motion with stretching into end-ranges of flexion and external rotation     neuromuscular re-education activities to improve: Coordination, Proprioception and Posture for 15 minutes. The following activities were included:  Scapular proprioceptive neuromuscular facilitation x 30  Seated scapular retraction with verbal and tactile cues x 20  Passive sidelying external rotation 3 x 10    therapeutic activities to improve functional performance for 4 minutes,  including:  Counter flexion slides x 20    supervised modalities after being cleared for contradictions: premodulated Electrical Stimulation:  Isaiah received premodulated Electrical Stimulation for pain control applied to the upper arm. Pt received stimulation at 100 % scan for 10 minutes. Isaiah tolderated treatment well without any adverse effects.      Patient Education and Home Exercises     Home Exercises Provided and Patient Education Provided     Education provided:   - discussed continuing home exercise program and corrected scapular retraction     Written Home Exercises Provided: Patient instructed to cont prior HEP. Exercises were reviewed and Isaiah was able to demonstrate them prior to the end of the session.  Isaiah demonstrated good  understanding of the education provided. See EMR under Patient Instructions for exercises provided during therapy sessions    ASSESSMENT     Isaiah is a 66 y.o. female referred to outpatient Physical Therapy with a medical diagnosis of s/p right rotator cuff repair. Pt presents with typical postop symptoms of pain, decreased range of motion, poor scapular stability, decreased right shoulder strength and decreased functional use of right upper extremity. 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     Isaiah reported an increase in pain after exercises but it decreased again with premodulated electrical muscle stimulation. She is very guarded with manual therapy and has a lot of difficulty with relaxing. She was able to reach 90 degrees flexion, 30 degrees external rotation with passive stretching.    Isaiah Is progressing towards her goals.   Pt prognosis is Good.     Pt will continue to benefit from skilled outpatient physical therapy to address the deficits listed in the problem list box on initial evaluation, provide pt/family education and to maximize pt's level of independence in the home and community environment.     Pt's spiritual, cultural and educational  needs considered and pt agreeable to plan of care and goals.     Anticipated barriers to physical therapy: none    Goals:   Short Term Goals: 4 weeks  1. Patient will be independent with home exercise program.  2. Patient will have passive range of motion as follows - 120 degrees flexion, 45 degrees external rotation and internal rotation.  3. Patient will be independent with dressing and showering.  4. Patient will be able to sleep all night in bed without waking due to right shoulder.     Long Term Goals: 8 weeks  1. Patient will have active range of motion as follow - 140 degrees flexion, 60 degrees external rotation and functional internal rotation to T10 for reaching overhead, behind head and behind back for dressing, grooming and hygiene.  2. Patient will have 4+/5 strength right shoulder/upper extremity to perform household chores and work related tasks.  3. Patient will place 3# dumbbell on head height shelf without hiking or pain left shoulder.  4. 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         PLAN     Plan of care Certification: 6/20/2022 to 8/12/2022.     Continue Plan of Care for Outpatient Physical Therapy 3 times weekly for 4 weeks, then 2 times weekly for 4 weeks to include the following interventions: Electrical Stimulation IFC/premod/hivolt as needed, Manual Therapy, Moist Heat/ Ice, Neuromuscular Re-ed, Patient Education, Therapeutic Activities, Therapeutic Exercise and Ultrasound.       Maggie Hernandez, PTA

## 2022-06-27 ENCOUNTER — CLINICAL SUPPORT (OUTPATIENT)
Dept: REHABILITATION | Facility: HOSPITAL | Age: 67
End: 2022-06-27
Payer: COMMERCIAL

## 2022-06-27 DIAGNOSIS — M25.511 ACUTE PAIN OF RIGHT SHOULDER: ICD-10-CM

## 2022-06-27 DIAGNOSIS — Z98.890 S/P ROTATOR CUFF REPAIR: ICD-10-CM

## 2022-06-27 DIAGNOSIS — M25.611 DECREASED ROM OF RIGHT SHOULDER: Primary | ICD-10-CM

## 2022-06-27 PROCEDURE — 97112 NEUROMUSCULAR REEDUCATION: CPT | Mod: CQ

## 2022-06-27 PROCEDURE — 97140 MANUAL THERAPY 1/> REGIONS: CPT | Mod: CQ

## 2022-06-27 PROCEDURE — 97530 THERAPEUTIC ACTIVITIES: CPT | Mod: CQ

## 2022-06-27 NOTE — PROGRESS NOTES
[facility]   Physical Therapy Treatment Note     Name: Isaiah Neil  Clinic Number: [id number]    Therapy Diagnosis:   Encounter Diagnoses   Name Primary?    Decreased ROM of right shoulder Yes    S/P rotator cuff repair     Acute pain of right shoulder      Physician: Davis Dunn MD    Visit Date: 6/27/2022    Physician Orders: PT Eval and Treat   Medical Diagnosis from Referral: s/p right rotator cuff repair   Evaluation Date: 6/20/2022  Authorization Period Expiration: 12/31/2022  Plan of Care Expiration: 8/12/2022  Progress Note Due: 7/20/2022  Visit # / Visits authorized: 3/ 100    PTA Visit #: 2/5     Time In: 1:55 pm  Time Out: 2:45 pm  Total Billable Time: 50 minutes    SUBJECTIVE     Pt reports: she hurt a little more after last visit.  She was compliant with home exercise program.  Response to previous treatment: soreness  Functional change: n/a    Pain: 4/10  Location: right shoulder      OBJECTIVE     Objective Measures updated at progress report unless specified.   77 degrees flexion on 6/27/2022    Treatment     Isaiah received the treatments listed below:      therapeutic exercises to develop strength, ROM and flexibility for 5 minutes including:  UBE   Pulleys x 3 minutes   Rows with 2 plates x 20    manual therapy techniques: Joint mobilizations, Manual traction, Myofacial release and Soft tissue Mobilization were applied to the: right shoulder for 15 minutes, including:  Myofascial release to upper traps  Passive range of motion with stretching into end-ranges of flexion and external rotation     neuromuscular re-education activities to improve: Coordination, Proprioception and Posture for 12 minutes. The following activities were included:  Scapular proprioceptive neuromuscular facilitation x 30  Sidelying scaption stretch with neuromuscular facilitation x 20  Active assistive sidelying external rotation 3 x 10  Supine cane flexion x 20    therapeutic  activities to improve functional performance for 8 minutes, including:  Counter flexion slides x 20  Ball roll flexion x 20    supervised modalities after being cleared for contradictions: premodulated Electrical Stimulation:  Isaiah received premodulated Electrical Stimulation for pain control applied to the upper arm. Pt received stimulation at 100 % scan for 10 minutes. Isaiah tolderated treatment well without any adverse effects.      Patient Education and Home Exercises     Home Exercises Provided and Patient Education Provided     Education provided:   - discussed continuing home exercise program and corrected scapular retraction     Written Home Exercises Provided: Patient instructed to cont prior HEP. Exercises were reviewed and Isaiah was able to demonstrate them prior to the end of the session.  Isaiah demonstrated good  understanding of the education provided. See EMR under Patient Instructions for exercises provided during therapy sessions    ASSESSMENT     Isaiah is a 66 y.o. female referred to outpatient Physical Therapy with a medical diagnosis of s/p right rotator cuff repair. Pt presents with typical postop symptoms of pain, decreased range of motion, poor scapular stability, decreased right shoulder strength and decreased functional use of right upper extremity. 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     Isaiah continues to have difficulty relaxing but did allow more stretching with less resistance today. She was able to reach 110 degrees flexion, 30 degrees external rotation with passive stretching. She was also able to add pulleys without significant difficulty, requiring only minimal cueing to keep shoulder blade down.     Isaiah Is progressing towards her goals.   Pt prognosis is Good.     Pt will continue to benefit from skilled outpatient physical therapy to address the deficits listed in the problem list box on initial evaluation, provide pt/family education and to maximize pt's  level of independence in the home and community environment.     Pt's spiritual, cultural and educational needs considered and pt agreeable to plan of care and goals.     Anticipated barriers to physical therapy: none    Goals:   Short Term Goals: 4 weeks  1. Patient will be independent with home exercise program.  2. Patient will have passive range of motion as follows - 120 degrees flexion, 45 degrees external rotation and internal rotation.  3. Patient will be independent with dressing and showering.  4. Patient will be able to sleep all night in bed without waking due to right shoulder.     Long Term Goals: 8 weeks  1. Patient will have active range of motion as follow - 140 degrees flexion, 60 degrees external rotation and functional internal rotation to T10 for reaching overhead, behind head and behind back for dressing, grooming and hygiene.  2. Patient will have 4+/5 strength right shoulder/upper extremity to perform household chores and work related tasks.  3. Patient will place 3# dumbbell on head height shelf without hiking or pain left shoulder.  4. 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         PLAN     Plan of care Certification: 6/20/2022 to 8/12/2022.     Continue Plan of Care for Outpatient Physical Therapy 3 times weekly for 4 weeks, then 2 times weekly for 4 weeks to include the following interventions: Electrical Stimulation IFC/premod/hivolt as needed, Manual Therapy, Moist Heat/ Ice, Neuromuscular Re-ed, Patient Education, Therapeutic Activities, Therapeutic Exercise and Ultrasound.       Maggie Hernandez, PTA

## 2022-06-29 ENCOUNTER — CLINICAL SUPPORT (OUTPATIENT)
Dept: REHABILITATION | Facility: HOSPITAL | Age: 67
End: 2022-06-29
Payer: COMMERCIAL

## 2022-06-29 DIAGNOSIS — M25.511 ACUTE PAIN OF RIGHT SHOULDER: ICD-10-CM

## 2022-06-29 DIAGNOSIS — M25.611 DECREASED ROM OF RIGHT SHOULDER: Primary | ICD-10-CM

## 2022-06-29 DIAGNOSIS — Z98.890 S/P ROTATOR CUFF REPAIR: ICD-10-CM

## 2022-06-29 PROCEDURE — 97110 THERAPEUTIC EXERCISES: CPT

## 2022-06-29 PROCEDURE — 97112 NEUROMUSCULAR REEDUCATION: CPT

## 2022-06-29 PROCEDURE — 97140 MANUAL THERAPY 1/> REGIONS: CPT

## 2022-06-29 NOTE — PROGRESS NOTES
[facility]   Physical Therapy Treatment Note     Name: Isaiah Neil  Clinic Number: [id number]    Therapy Diagnosis:   Encounter Diagnoses   Name Primary?    Decreased ROM of right shoulder Yes    S/P rotator cuff repair     Acute pain of right shoulder      Physician: Davis Dunn MD    Visit Date: 6/29/2022    Physician Orders: PT Eval and Treat   Medical Diagnosis from Referral: s/p right rotator cuff repair   Evaluation Date: 6/20/2022  Authorization Period Expiration: 12/31/2022  Plan of Care Expiration: 8/12/2022  Progress Note Due: 7/20/2022  Visit # / Visits authorized: 4/100    PTA Visit #:      Time In: 1013 am  Time Out: 1107 am  Total Billable Time: 54 minutes    SUBJECTIVE     Pt reports: she was a little sore after last visit.  She was compliant with home exercise program.  Response to previous treatment: soreness  Functional change: n/a    Pain: 2/10  Location: right shoulder/ bicep region    OBJECTIVE     Objective Measures updated at progress report unless specified.   80 degrees flexion, 42 external rotation in adduction and internal rotation to ischial tuberosity on 6/29/2022    Treatment     Isaiah received the treatments listed below:      therapeutic exercises to develop strength, ROM and flexibility for 13 minutes including:  UBE x 5 minutes - with rest breaks  Pulleys 10 x 10 second hold - required cues to hold the stretch and also for correct form  Rows with 2 plates 2 x 10    manual therapy techniques: Joint mobilizations, Manual traction, Myofacial release and Soft tissue Mobilization were applied to the: right shoulder for 22 minutes, including:  Myofascial release to right upper arm  Passive range of motion with stretching into end-ranges of flexion, internal rotation, and external rotation     neuromuscular re-education activities to improve: Coordination, Proprioception and Posture for 16 minutes. The following activities were included:  Scapular  proprioceptive neuromuscular facilitation 3 x 10  Sidelying scaption stretch with neuromuscular facilitation 2 x 10  Active assistive sidelying external rotation 3 x 10  Supine cane flexion x 15    therapeutic activities to improve functional performance for 3 minutes, including:  Counter flexion slides x 30  Ball roll flexion --    supervised modalities after being cleared for contradictions: premodulated Electrical Stimulation:  Isaiah received premodulated Electrical Stimulation for pain control applied to the upper arm. Pt received stimulation at 100 % scan for 10 minutes. Isaiah tolderated treatment well without any adverse effects.      Patient Education and Home Exercises     Home Exercises Provided and Patient Education Provided     Education provided:   - discussed continuing home exercise program and corrected scapular retraction     Written Home Exercises Provided: Patient instructed to cont prior HEP. Exercises were reviewed and Isaiah was able to demonstrate them prior to the end of the session.  Isaiah demonstrated good  understanding of the education provided. See EMR under Patient Instructions for exercises provided during therapy sessions    ASSESSMENT     Isaiah is a 66 y.o. female referred to outpatient Physical Therapy with a medical diagnosis of s/p right rotator cuff repair. Pt presents with typical postop symptoms of pain, decreased range of motion, poor scapular stability, decreased right shoulder strength and decreased functional use of right upper extremity. 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     Isaiah continues to improve. Her active range of motion was slightly improved this visit. She still has difficulty with supine cane flexion. She was able to relax a little more with manual stretching today. She had some complaints of pain/tightness in right upper arm which was addressed with soft tissue mobilizaiton. She requires cueing to perform exercises correctly and she does not  keep count well. Will continue to progress as tolerated.     Isaiah Is progressing towards her goals.   Pt prognosis is Good.     Pt will continue to benefit from skilled outpatient physical therapy to address the deficits listed in the problem list box on initial evaluation, provide pt/family education and to maximize pt's level of independence in the home and community environment.     Pt's spiritual, cultural and educational needs considered and pt agreeable to plan of care and goals.     Anticipated barriers to physical therapy: none    Goals:   Short Term Goals: 4 weeks  1. Patient will be independent with home exercise program.  2. Patient will have passive range of motion as follows - 120 degrees flexion, 45 degrees external rotation and internal rotation.  3. Patient will be independent with dressing and showering.  4. Patient will be able to sleep all night in bed without waking due to right shoulder.     Long Term Goals: 8 weeks  1. Patient will have active range of motion as follow - 140 degrees flexion, 60 degrees external rotation and functional internal rotation to T10 for reaching overhead, behind head and behind back for dressing, grooming and hygiene.  2. Patient will have 4+/5 strength right shoulder/upper extremity to perform household chores and work related tasks.  3. Patient will place 3# dumbbell on head height shelf without hiking or pain left shoulder.  4. 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         PLAN     Plan of care Certification: 6/20/2022 to 8/12/2022.     Continue Plan of Care for Outpatient Physical Therapy 3 times weekly for 4 weeks, then 2 times weekly for 4 weeks to include the following interventions: Electrical Stimulation IFC/premod/hivolt as needed, Manual Therapy, Moist Heat/ Ice, Neuromuscular Re-ed, Patient Education, Therapeutic Activities, Therapeutic Exercise and Ultrasound.       PABLO BAEZ, PT

## 2022-06-30 ENCOUNTER — CLINICAL SUPPORT (OUTPATIENT)
Dept: REHABILITATION | Facility: HOSPITAL | Age: 67
End: 2022-06-30
Payer: COMMERCIAL

## 2022-06-30 DIAGNOSIS — M25.611 DECREASED ROM OF RIGHT SHOULDER: Primary | ICD-10-CM

## 2022-06-30 DIAGNOSIS — Z98.890 S/P ROTATOR CUFF REPAIR: ICD-10-CM

## 2022-06-30 DIAGNOSIS — M25.511 ACUTE PAIN OF RIGHT SHOULDER: ICD-10-CM

## 2022-06-30 PROCEDURE — 97014 ELECTRIC STIMULATION THERAPY: CPT

## 2022-06-30 PROCEDURE — 97530 THERAPEUTIC ACTIVITIES: CPT

## 2022-06-30 PROCEDURE — 97140 MANUAL THERAPY 1/> REGIONS: CPT

## 2022-06-30 PROCEDURE — 97112 NEUROMUSCULAR REEDUCATION: CPT

## 2022-06-30 NOTE — PROGRESS NOTES
[facility]   Physical Therapy Treatment Note     Name: Isaiah Neil  Clinic Number: [id number]    Therapy Diagnosis:   Encounter Diagnoses   Name Primary?    Decreased ROM of right shoulder Yes    S/P rotator cuff repair     Acute pain of right shoulder      Physician: Davis Dunn MD    Visit Date: 6/30/2022    Physician Orders: PT Eval and Treat   Medical Diagnosis from Referral: s/p right rotator cuff repair   Evaluation Date: 6/20/2022  Authorization Period Expiration: 12/31/2022  Plan of Care Expiration: 8/12/2022  Progress Note Due: 7/20/2022  Visit # / Visits authorized: 5/100    PTA Visit #:      Time In: 833 am  Time Out: 934 am  Total Billable Time: 56 minutes    SUBJECTIVE     Pt reports: she was a little sore after last visit.  She was compliant with home exercise program.  Response to previous treatment: soreness  Functional change: n/a    Pain: 2/10  Location: right shoulder/ bicep region    OBJECTIVE     Objective Measures updated at progress report unless specified.   80 degrees flexion, 42 external rotation in adduction and internal rotation to ischial tuberosity on 6/29/2022    Treatment     Isaiah received the treatments listed below:      therapeutic exercises to develop strength, ROM and flexibility for 0 minutes including:  UBE --  Pulleys --  Rows with 2 plates --    manual therapy techniques: Joint mobilizations, Manual traction, Myofacial release and Soft tissue Mobilization were applied to the: right shoulder for 20 minutes, including:  Myofascial release to right upper arm  Passive range of motion with stretching into end-ranges of flexion, internal rotation, and external rotation     neuromuscular re-education activities to improve: Coordination, Proprioception and Posture for 15 minutes. The following activities were included:  Scapular proprioceptive neuromuscular facilitation 3 x 10  Sidelying scaption stretch with neuromuscular facilitation 2 x  10  Active assistive sidelying external rotation 3 x 10  Supine cane flexion --    therapeutic activities to improve functional performance for 6 minutes, including:  Counter flexion slides x 30  Ball roll flexion x 3 minutes    supervised modalities after being cleared for contradictions: premodulated Electrical Stimulation:  Isaiah received IFC with ice for pain control applied to the right shoulder for 15 minutes. Isaiah tolderated treatment well without any adverse effects.      Patient Education and Home Exercises     Home Exercises Provided and Patient Education Provided     Education provided:   - discussed continuing home exercise program and corrected scapular retraction     Written Home Exercises Provided: Patient instructed to cont prior HEP. Exercises were reviewed and Isaiah was able to demonstrate them prior to the end of the session.  Isaiah demonstrated good  understanding of the education provided. See EMR under Patient Instructions for exercises provided during therapy sessions    ASSESSMENT     Isaiah is a 66 y.o. female referred to outpatient Physical Therapy with a medical diagnosis of s/p right rotator cuff repair. Pt presents with typical postop symptoms of pain, decreased range of motion, poor scapular stability, decreased right shoulder strength and decreased functional use of right upper extremity. 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     Isaiah was sore today from yesterday's visit so we decreased the intensity of her exercises and focused more on manual and neuromuscular reeducation. Ended with IFC and ice to decrease pain.    Isaiah Is progressing towards her goals.   Pt prognosis is Good.     Pt will continue to benefit from skilled outpatient physical therapy to address the deficits listed in the problem list box on initial evaluation, provide pt/family education and to maximize pt's level of independence in the home and community environment.     Pt's spiritual, cultural  and educational needs considered and pt agreeable to plan of care and goals.     Anticipated barriers to physical therapy: none    Goals:   Short Term Goals: 4 weeks  1. Patient will be independent with home exercise program.  2. Patient will have passive range of motion as follows - 120 degrees flexion, 45 degrees external rotation and internal rotation.  3. Patient will be independent with dressing and showering.  4. Patient will be able to sleep all night in bed without waking due to right shoulder.     Long Term Goals: 8 weeks  1. Patient will have active range of motion as follow - 140 degrees flexion, 60 degrees external rotation and functional internal rotation to T10 for reaching overhead, behind head and behind back for dressing, grooming and hygiene.  2. Patient will have 4+/5 strength right shoulder/upper extremity to perform household chores and work related tasks.  3. Patient will place 3# dumbbell on head height shelf without hiking or pain left shoulder.  4. 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         PLAN     Plan of care Certification: 6/20/2022 to 8/12/2022.     Continue Plan of Care for Outpatient Physical Therapy 3 times weekly for 4 weeks, then 2 times weekly for 4 weeks to include the following interventions: Electrical Stimulation IFC/premod/hivolt as needed, Manual Therapy, Moist Heat/ Ice, Neuromuscular Re-ed, Patient Education, Therapeutic Activities, Therapeutic Exercise and Ultrasound.       PABLO BAEZ, PT

## 2022-07-14 ENCOUNTER — CLINICAL SUPPORT (OUTPATIENT)
Dept: REHABILITATION | Facility: HOSPITAL | Age: 67
End: 2022-07-14
Payer: COMMERCIAL

## 2022-07-14 DIAGNOSIS — Z98.890 S/P ROTATOR CUFF REPAIR: ICD-10-CM

## 2022-07-14 DIAGNOSIS — M25.611 DECREASED ROM OF RIGHT SHOULDER: Primary | ICD-10-CM

## 2022-07-14 DIAGNOSIS — M25.511 ACUTE PAIN OF RIGHT SHOULDER: ICD-10-CM

## 2022-07-14 PROCEDURE — 97110 THERAPEUTIC EXERCISES: CPT | Mod: CQ

## 2022-07-14 PROCEDURE — 97140 MANUAL THERAPY 1/> REGIONS: CPT | Mod: CQ

## 2022-07-14 PROCEDURE — 97112 NEUROMUSCULAR REEDUCATION: CPT | Mod: CQ

## 2022-07-14 PROCEDURE — 97014 ELECTRIC STIMULATION THERAPY: CPT | Mod: CQ

## 2022-07-14 NOTE — PROGRESS NOTES
[facility]   Physical Therapy Treatment Note     Name: Isaiah Neil  Clinic Number: [id number]    Therapy Diagnosis:   Encounter Diagnoses   Name Primary?    Decreased ROM of right shoulder Yes    S/P rotator cuff repair     Acute pain of right shoulder      Physician: Davis Dunn MD    Visit Date: 7/14/2022    Physician Orders: PT Eval and Treat   Medical Diagnosis from Referral: s/p right rotator cuff repair   Evaluation Date: 6/20/2022  Authorization Period Expiration: 12/31/2022  Plan of Care Expiration: 8/12/2022  Progress Note Due: 7/20/2022  Visit # / Visits authorized: 6/100  DOS: 5/3/2022  PTA Visit #:  1    Time In: 8:40 am  Time Out: 9:41 am  Total Billable Time: 53 minutes    SUBJECTIVE     Pt reports: she is doing ok. She did not have any appts scheduled last week and she said she forgot about her appt yesterday. Last time she had back to back appts it was too much and she had a lot of tingling afterward. She reports her left arm/shoulder is very sore. She lifted a case of water at [employer] about 6 months ago and it is still bothering her. MRI showed tendonitis.  She was compliant with home exercise program.  Response to previous treatment: soreness  Functional change: it is getting easier to dress herself.     Pain: 3/10  Location: right shoulder/ bicep region    OBJECTIVE     Objective Measures updated at progress report unless specified.   95 degrees flexion, 25 external rotation in adduction and internal rotation to ischial tuberosity on 7/14/2022    Treatment     Isaiah received the treatments listed below:      therapeutic exercises to develop strength, ROM and flexibility for 8 minutes including:  UBE x 5 minutes   Supine cane flexion 10 x 10 second hold   Rows with 2 plates x 20    manual therapy techniques: Joint mobilizations, Manual traction, Myofacial release and Soft tissue Mobilization were applied to the: right shoulder for 20 minutes,  including:  Myofascial release to right upper arm  Passive range of motion with stretching into end-ranges of flexion, internal rotation, and external rotation     neuromuscular re-education activities to improve: Coordination, Proprioception and Posture for 15 minutes. The following activities were included:  Scapular proprioceptive neuromuscular facilitation 3 x 10  Sidelying scaption stretch with neuromuscular facilitation 2 x 10  Active assistive sidelying external rotation 3 x 10  Retraction with extension with blue theraband x 20    therapeutic activities to improve functional performance for 0 minutes, including:  Counter flexion slides  Ball roll flexion    supervised modalities after being cleared for contradictions: premodulated Electrical Stimulation:  Isaiah received premodulated electrical muscle stimulation for pain control applied to the right shoulder for 10 minutes. Isaiah tolderated treatment well without any adverse effects.      Patient Education and Home Exercises     Home Exercises Provided and Patient Education Provided     Education provided:   - discussed continuing home exercise program and corrected scapular retraction     Written Home Exercises Provided: Patient instructed to cont prior HEP. Exercises were reviewed and Isaiah was able to demonstrate them prior to the end of the session.  Isaiah demonstrated good  understanding of the education provided. See EMR under Patient Instructions for exercises provided during therapy sessions    ASSESSMENT     Isaiah is a 66 y.o. female referred to outpatient Physical Therapy with a medical diagnosis of s/p right rotator cuff repair. Pt presents with typical postop symptoms of pain, decreased range of motion, poor scapular stability, decreased right shoulder strength and decreased functional use of right upper extremity. 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     Isaiah is now 10 weeks post-op. She complained of discomfort with flexion  stretching. She reports the pain is in her upper arm in mid and post deltoid areas. She was very uncomfortable with stretching and complained of discomfort even with scapular exercises. She was very guarded with manual stretching, especially around 90 degrees flexion and 30 degrees external rotation. She finally relaxed and was able to reach 105 degrees flexion and 45 degrees external rotation passively. Patient really needs significant stretching and improved scapular awareness.    Isaiah Is progressing towards her goals.   Pt prognosis is Good.     Pt will continue to benefit from skilled outpatient physical therapy to address the deficits listed in the problem list box on initial evaluation, provide pt/family education and to maximize pt's level of independence in the home and community environment.     Pt's spiritual, cultural and educational needs considered and pt agreeable to plan of care and goals.     Anticipated barriers to physical therapy: none    Goals:   Short Term Goals: 4 weeks  1. Patient will be independent with home exercise program.  2. Patient will have passive range of motion as follows - 120 degrees flexion, 45 degrees external rotation and internal rotation.  3. Patient will be independent with dressing and showering.  4. Patient will be able to sleep all night in bed without waking due to right shoulder.     Long Term Goals: 8 weeks  1. Patient will have active range of motion as follow - 140 degrees flexion, 60 degrees external rotation and functional internal rotation to T10 for reaching overhead, behind head and behind back for dressing, grooming and hygiene.  2. Patient will have 4+/5 strength right shoulder/upper extremity to perform household chores and work related tasks.  3. Patient will place 3# dumbbell on head height shelf without hiking or pain left shoulder.  4. 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         PLAN     Plan of care Certification: 6/20/2022 to  8/12/2022.     Continue Plan of Care for Outpatient Physical Therapy 3 times weekly for 4 weeks, then 2 times weekly for 4 weeks to include the following interventions: Electrical Stimulation IFC/premod/hivolt as needed, Manual Therapy, Moist Heat/ Ice, Neuromuscular Re-ed, Patient Education, Therapeutic Activities, Therapeutic Exercise and Ultrasound.       Maggie Hernandez, PTA

## 2022-07-20 ENCOUNTER — CLINICAL SUPPORT (OUTPATIENT)
Dept: REHABILITATION | Facility: HOSPITAL | Age: 67
End: 2022-07-20
Payer: COMMERCIAL

## 2022-07-20 DIAGNOSIS — M25.611 DECREASED ROM OF RIGHT SHOULDER: Primary | ICD-10-CM

## 2022-07-20 DIAGNOSIS — Z98.890 S/P ROTATOR CUFF REPAIR: ICD-10-CM

## 2022-07-20 DIAGNOSIS — M25.511 ACUTE PAIN OF RIGHT SHOULDER: ICD-10-CM

## 2022-07-20 PROCEDURE — 97140 MANUAL THERAPY 1/> REGIONS: CPT | Mod: CQ

## 2022-07-20 PROCEDURE — 97112 NEUROMUSCULAR REEDUCATION: CPT | Mod: CQ

## 2022-07-20 PROCEDURE — 97010 HOT OR COLD PACKS THERAPY: CPT | Mod: CQ

## 2022-07-20 PROCEDURE — 97110 THERAPEUTIC EXERCISES: CPT | Mod: CQ

## 2022-07-20 NOTE — PROGRESS NOTES
[facility]   Physical Therapy Treatment Note     Name: Isaiah Neil  Clinic Number: [id number]    Therapy Diagnosis:   Encounter Diagnoses   Name Primary?    Decreased ROM of right shoulder Yes    S/P rotator cuff repair     Acute pain of right shoulder      Physician: Davis Dunn MD    Visit Date: 7/20/2022    Physician Orders: PT Eval and Treat   Medical Diagnosis from Referral: s/p right rotator cuff repair   Evaluation Date: 6/20/2022  Authorization Period Expiration: 12/31/2022  Plan of Care Expiration: 8/12/2022  Progress Note Due: 7/20/2022  Visit # / Visits authorized: 6/100  DOS: 5/3/2022  PTA Visit #:  2    Time In: 0830  Time Out: 9:41 am  Total Billable Time: 53 minutes    SUBJECTIVE     Pt reports: she is doing ok. She did not have any appts scheduled last week and she said she forgot about her appt yesterday. Last time she had back to back appts it was too much and she had a lot of tingling afterward. She reports her left arm/shoulder is very sore. She lifted a case of water at [employer] about 6 months ago and it is still bothering her. MRI showed tendonitis.  She was compliant with home exercise program.  Response to previous treatment: soreness  Functional change: it is getting easier to dress herself.     Pain: 3/10  Location: right shoulder/ bicep region    OBJECTIVE     Objective Measures updated at progress report unless specified.   95 degrees flexion, 25 external rotation in adduction and internal rotation to ischial tuberosity on 7/14/2022    Treatment     Isaiah received the treatments listed below:      therapeutic exercises to develop strength, ROM and flexibility for 8 minutes including:  UBE x 5 minutes   Supine cane flexion 10 x 10 second hold   Rows with 2 plates x 20    manual therapy techniques: Joint mobilizations, Manual traction, Myofacial release and Soft tissue Mobilization were applied to the: right shoulder for 20 minutes,  including:  Myofascial release to right upper arm  Passive range of motion with stretching into end-ranges of flexion, internal rotation, and external rotation     neuromuscular re-education activities to improve: Coordination, Proprioception and Posture for 15 minutes. The following activities were included:  Scapular proprioceptive neuromuscular facilitation 3 x 10  Sidelying scaption stretch with neuromuscular facilitation 2 x 10  Active assistive sidelying external rotation 3 x 10  Retraction with extension with blue theraband x 20    therapeutic activities to improve functional performance for 0 minutes, including:  Counter flexion slides  Ball roll flexion    supervised modalities after being cleared for contradictions: premodulated Electrical Stimulation:  Isaiah received premodulated electrical muscle stimulation for pain control applied to the right shoulder for 0 minutes. Isaiah tolderated treatment well without any adverse effects.    Ice pack x 7 min to right shoulder and left elbow  Patient Education and Home Exercises     Home Exercises Provided and Patient Education Provided     Education provided:   - discussed using ice twice a day for shoulder and elbow     Written Home Exercises Provided: Patient instructed to cont prior HEP. Exercises were reviewed and Isaiah was able to demonstrate them prior to the end of the session.  Isaiah demonstrated good  understanding of the education provided. See EMR under Patient Instructions for exercises provided during therapy sessions    ASSESSMENT   Case conference with John Pyle PT for initial PTA visit. Resists and will not relax to allow good mobs and ROM.    Isaiah is a 66 y.o. female referred to outpatient Physical Therapy with a medical diagnosis of s/p right rotator cuff repair. Pt presents with typical postop symptoms of pain, decreased range of motion, poor scapular stability, decreased right shoulder strength and decreased functional use of  right upper extremity. 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     Isaiah is now 10 weeks post-op. She complained of discomfort with flexion stretching. She reports the pain is in her upper arm in mid and post deltoid areas. She was very uncomfortable with stretching and complained of discomfort even with scapular exercises. She was very guarded with manual stretching, especially around 90 degrees flexion and 30 degrees external rotation. She finally relaxed and was able to reach 105 degrees flexion and 45 degrees external rotation passively. Patient really needs significant stretching and improved scapular awareness.    Isaiah Is progressing towards her goals.   Pt prognosis is Good.     Pt will continue to benefit from skilled outpatient physical therapy to address the deficits listed in the problem list box on initial evaluation, provide pt/family education and to maximize pt's level of independence in the home and community environment.     Pt's spiritual, cultural and educational needs considered and pt agreeable to plan of care and goals.     Anticipated barriers to physical therapy: none    Goals:   Short Term Goals: 4 weeks  1. Patient will be independent with home exercise program.  2. Patient will have passive range of motion as follows - 120 degrees flexion, 45 degrees external rotation and internal rotation.  3. Patient will be independent with dressing and showering.  4. Patient will be able to sleep all night in bed without waking due to right shoulder.     Long Term Goals: 8 weeks  1. Patient will have active range of motion as follow - 140 degrees flexion, 60 degrees external rotation and functional internal rotation to T10 for reaching overhead, behind head and behind back for dressing, grooming and hygiene.  2. Patient will have 4+/5 strength right shoulder/upper extremity to perform household chores and work related tasks.  3. Patient will place 3# dumbbell on head height shelf without hiking  or pain left shoulder.  4. 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         PLAN     Plan of care Certification: 6/20/2022 to 8/12/2022.     Continue Plan of Care for Outpatient Physical Therapy 3 times weekly for 4 weeks, then 2 times weekly for 4 weeks to include the following interventions: Electrical Stimulation IFC/premod/hivolt as needed, Manual Therapy, Moist Heat/ Ice, Neuromuscular Re-ed, Patient Education, Therapeutic Activities, Therapeutic Exercise and Ultrasound.       Deborah Esparza, PTA

## 2022-07-21 ENCOUNTER — CLINICAL SUPPORT (OUTPATIENT)
Dept: REHABILITATION | Facility: HOSPITAL | Age: 67
End: 2022-07-21
Payer: COMMERCIAL

## 2022-07-21 DIAGNOSIS — Z98.890 S/P ROTATOR CUFF REPAIR: ICD-10-CM

## 2022-07-21 DIAGNOSIS — M25.511 ACUTE PAIN OF RIGHT SHOULDER: ICD-10-CM

## 2022-07-21 DIAGNOSIS — M25.611 DECREASED ROM OF RIGHT SHOULDER: Primary | ICD-10-CM

## 2022-07-21 PROCEDURE — 97112 NEUROMUSCULAR REEDUCATION: CPT | Mod: CQ

## 2022-07-21 PROCEDURE — 97110 THERAPEUTIC EXERCISES: CPT | Mod: CQ

## 2022-07-21 PROCEDURE — 97140 MANUAL THERAPY 1/> REGIONS: CPT | Mod: CQ

## 2022-07-21 NOTE — PROGRESS NOTES
"[facility]   Physical Therapy Treatment Note     Name: Isaiah Neil  Clinic Number: [id number]    Therapy Diagnosis:   Encounter Diagnoses   Name Primary?    Decreased ROM of right shoulder Yes    S/P rotator cuff repair     Acute pain of right shoulder      Physician: Davis Dunn MD    Visit Date: 7/21/2022    Physician Orders: PT Eval and Treat   Medical Diagnosis from Referral: s/p right rotator cuff repair   Evaluation Date: 6/20/2022  Authorization Period Expiration: 12/31/2022  Plan of Care Expiration: 8/12/2022  Progress Note Due: 7/20/2022  Visit # / Visits authorized: 8/100  DOS: 5/3/2022  PTA Visit #:  3    Time In: 7:05 am  Time Out: 7:52 am  Total Billable Time: 47 minutes    SUBJECTIVE     Pt reports: her main complaint of pain is tendonitis in bilateral arms. She also has an "electrical pain" down her arm with some active movements.   She was compliant with home exercise program.  Response to previous treatment: soreness  Functional change: it is getting easier to dress herself.     Pain: 1/10  Location: right shoulder/ bicep region    OBJECTIVE     Objective Measures updated at progress report unless specified.   105 degrees flexion, 25 external rotation in adduction and internal rotation to ischial tuberosity on 7/21/2022    Treatment     Isaiah received the treatments listed below:      therapeutic exercises to develop strength, ROM and flexibility for 15 minutes including:  UBE x 5 minutes   Supine cane flexion 10 x 10 second hold (over towel 7/21/2022 to assist with retraction)  Rows with 2 plates x 30  Wall cane flexion x 20    manual therapy techniques: Joint mobilizations, Manual traction, Myofacial release and Soft tissue Mobilization were applied to the: right shoulder for 17 minutes, including:  Myofascial release to right upper arm  Passive range of motion with stretching into end-ranges of flexion, internal rotation, and external rotation " "    neuromuscular re-education activities to improve: Coordination, Proprioception and Posture for 15 minutes. The following activities were included:  Scapular proprioceptive neuromuscular facilitation 3 x 10  Sidelying scaption stretch with neuromuscular facilitation 2 x 10  Active assistive sidelying external rotation 3 x 10  Retraction with extension with blue theraband x 30    Patient Education and Home Exercises     Home Exercises Provided and Patient Education Provided     Education provided:   - discussed using ice twice a day for shoulder and elbow     Written Home Exercises Provided: Patient instructed to cont prior HEP. Exercises were reviewed and Isaiah was able to demonstrate them prior to the end of the session.  Isaiah demonstrated good  understanding of the education provided. See EMR under Patient Instructions for exercises provided during therapy sessions    ASSESSMENT     Isaiah is a 66 y.o. female referred to outpatient Physical Therapy with a medical diagnosis of s/p right rotator cuff repair. Pt presents with typical postop symptoms of pain, decreased range of motion, poor scapular stability, decreased right shoulder strength and decreased functional use of right upper extremity. 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     Isaiah is now 11 weeks post-op. She complains of tightness, tenderness and pain during stretching. She received myofascial release to subscapularis, pectoralis minor and upper trap to decrease pain and improve range of motion. She complained of discomfort throughout myofascial release but reported relief of "electrical pain" afterward.      Isaiah Is progressing towards her goals.   Pt prognosis is Good.     Pt will continue to benefit from skilled outpatient physical therapy to address the deficits listed in the problem list box on initial evaluation, provide pt/family education and to maximize pt's level of independence in the home and community environment.     Pt's " spiritual, cultural and educational needs considered and pt agreeable to plan of care and goals.     Anticipated barriers to physical therapy: none    Goals:   Short Term Goals: 4 weeks  1. Patient will be independent with home exercise program.  2. Patient will have passive range of motion as follows - 120 degrees flexion, 45 degrees external rotation and internal rotation.  3. Patient will be independent with dressing and showering.  4. Patient will be able to sleep all night in bed without waking due to right shoulder.     Long Term Goals: 8 weeks  1. Patient will have active range of motion as follow - 140 degrees flexion, 60 degrees external rotation and functional internal rotation to T10 for reaching overhead, behind head and behind back for dressing, grooming and hygiene.  2. Patient will have 4+/5 strength right shoulder/upper extremity to perform household chores and work related tasks.  3. Patient will place 3# dumbbell on head height shelf without hiking or pain left shoulder.  4. 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         PLAN     Plan of care Certification: 6/20/2022 to 8/12/2022.     Continue Plan of Care for Outpatient Physical Therapy 3 times weekly for 4 weeks, then 2 times weekly for 4 weeks to include the following interventions: Electrical Stimulation IFC/premod/hivolt as needed, Manual Therapy, Moist Heat/ Ice, Neuromuscular Re-ed, Patient Education, Therapeutic Activities, Therapeutic Exercise and Ultrasound.       Maggie Hernandez, PTA

## 2022-07-26 ENCOUNTER — CLINICAL SUPPORT (OUTPATIENT)
Dept: REHABILITATION | Facility: HOSPITAL | Age: 67
End: 2022-07-26
Payer: COMMERCIAL

## 2022-07-26 DIAGNOSIS — Z98.890 S/P ROTATOR CUFF REPAIR: ICD-10-CM

## 2022-07-26 DIAGNOSIS — M25.511 ACUTE PAIN OF RIGHT SHOULDER: ICD-10-CM

## 2022-07-26 DIAGNOSIS — M25.611 DECREASED ROM OF RIGHT SHOULDER: Primary | ICD-10-CM

## 2022-07-26 PROCEDURE — 97112 NEUROMUSCULAR REEDUCATION: CPT | Mod: CQ

## 2022-07-26 PROCEDURE — 97140 MANUAL THERAPY 1/> REGIONS: CPT | Mod: CQ

## 2022-07-26 NOTE — PROGRESS NOTES
[facility]   Physical Therapy Treatment Note     Name: Isaiah Neil  Clinic Number: [id number]    Therapy Diagnosis:   No diagnosis found.  Physician: Davis Dunn MD    Visit Date: 7/26/2022    Physician Orders: PT Eval and Treat   Medical Diagnosis from Referral: s/p right rotator cuff repair   Evaluation Date: 6/20/2022  Authorization Period Expiration: 12/31/2022  Plan of Care Expiration: 8/12/2022  Progress Note Due: 7/20/2022  Visit # / Visits authorized: 9/100  DOS: 5/3/2022  PTA Visit #:  4    Time In: 8:37 am  Time Out: 9:13 am  Total Billable Time: 34 minutes    SUBJECTIVE     Pt reports: she is feeling better at the moment. 7 minutes late on arrival today.  She was compliant with home exercise program.  Response to previous treatment: soreness  Functional change: it is getting easier to dress herself.     Pain: 0/10  Location: right shoulder/ bicep region    OBJECTIVE     Objective Measures updated at progress report unless specified.   115 degrees flexion, 38 external rotation in adduction and internal rotation to ischial tuberosity on 7/21/2022    Treatment     Isaiah received the treatments listed below:      therapeutic exercises to develop strength, ROM and flexibility for 10 minutes including:  UBE x 5 minutes   Supine cane flexion 10 x 10 second hold   Rows with 2 plates x 20 each high and low  Wall cane flexion x 20 with 5 second hold     manual therapy techniques: Joint mobilizations, Manual traction, Myofacial release and Soft tissue Mobilization were applied to the: right shoulder for 15 minutes, including:  Myofascial release to right upper arm  Passive range of motion with stretching into end-ranges of flexion, internal rotation, and external rotation     neuromuscular re-education activities to improve: Coordination, Proprioception and Posture for 10 minutes. The following activities were included:  Scapular proprioceptive neuromuscular facilitation 3 x  10  Sidelying external rotation with 1 lb 3 x 10  Retraction with extension with blue theraband x 30  External rotation with yellow theraband x 20    Patient Education and Home Exercises     Home Exercises Provided and Patient Education Provided     Education provided:   - discussed using ice twice a day for shoulder and elbow     Written Home Exercises Provided: Patient instructed to cont prior HEP. Exercises were reviewed and Isaiah was able to demonstrate them prior to the end of the session.  Isaiah demonstrated good  understanding of the education provided. See EMR under Patient Instructions for exercises provided during therapy sessions    ASSESSMENT     Isaiah is a 66 y.o. female referred to outpatient Physical Therapy with a medical diagnosis of s/p right rotator cuff repair. Pt presents with typical postop symptoms of pain, decreased range of motion, poor scapular stability, decreased right shoulder strength and decreased functional use of right upper extremity. 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     Isaiah is now 12 weeks post-op. Passively with 130 degrees flexion, 35 degrees external rotation in scaption. She continues to complain of discomfort with stretching - when asked she said it was a strong pull. Explained that is normal for endrange stretching. She also complains of pain in left arm, mostly in bicep area. She is showing progress with both active and passive range of motion.     Isaiah Is progressing towards her goals.   Pt prognosis is Good.     Pt will continue to benefit from skilled outpatient physical therapy to address the deficits listed in the problem list box on initial evaluation, provide pt/family education and to maximize pt's level of independence in the home and community environment.     Pt's spiritual, cultural and educational needs considered and pt agreeable to plan of care and goals.     Anticipated barriers to physical therapy: none    Goals:   Short Term Goals: 4  weeks  1. Patient will be independent with home exercise program.  2. Patient will have passive range of motion as follows - 120 degrees flexion, 45 degrees external rotation and internal rotation.  3. Patient will be independent with dressing and showering.  4. Patient will be able to sleep all night in bed without waking due to right shoulder.     Long Term Goals: 8 weeks  1. Patient will have active range of motion as follow - 140 degrees flexion, 60 degrees external rotation and functional internal rotation to T10 for reaching overhead, behind head and behind back for dressing, grooming and hygiene.  2. Patient will have 4+/5 strength right shoulder/upper extremity to perform household chores and work related tasks.  3. Patient will place 3# dumbbell on head height shelf without hiking or pain left shoulder.  4. 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         PLAN     Plan of care Certification: 6/20/2022 to 8/12/2022.     Continue Plan of Care for Outpatient Physical Therapy 3 times weekly for 4 weeks, then 2 times weekly for 4 weeks to include the following interventions: Electrical Stimulation IFC/premod/hivolt as needed, Manual Therapy, Moist Heat/ Ice, Neuromuscular Re-ed, Patient Education, Therapeutic Activities, Therapeutic Exercise and Ultrasound.       Maggie Hernandez, PTA

## 2022-07-27 ENCOUNTER — CLINICAL SUPPORT (OUTPATIENT)
Dept: REHABILITATION | Facility: HOSPITAL | Age: 67
End: 2022-07-27
Payer: COMMERCIAL

## 2022-07-27 DIAGNOSIS — M25.511 ACUTE PAIN OF RIGHT SHOULDER: ICD-10-CM

## 2022-07-27 DIAGNOSIS — M25.611 DECREASED ROM OF RIGHT SHOULDER: Primary | ICD-10-CM

## 2022-07-27 DIAGNOSIS — Z98.890 S/P ROTATOR CUFF REPAIR: ICD-10-CM

## 2022-07-27 PROCEDURE — 97140 MANUAL THERAPY 1/> REGIONS: CPT | Mod: CQ

## 2022-07-27 PROCEDURE — 97110 THERAPEUTIC EXERCISES: CPT | Mod: CQ

## 2022-07-27 PROCEDURE — 97112 NEUROMUSCULAR REEDUCATION: CPT | Mod: CQ

## 2022-07-27 NOTE — PROGRESS NOTES
[facility]   Physical Therapy Treatment Note     Name: Isaiah Neil  Clinic Number: [id number]    Therapy Diagnosis:   Encounter Diagnoses   Name Primary?    Decreased ROM of right shoulder Yes    S/P rotator cuff repair     Acute pain of right shoulder      Physician: Davis Dunn MD    Visit Date: 7/27/2022    Physician Orders: PT Eval and Treat   Medical Diagnosis from Referral: s/p right rotator cuff repair   Evaluation Date: 6/20/2022  Authorization Period Expiration: 12/31/2022  Plan of Care Expiration: 8/12/2022  Progress Note Due: 7/20/2022  Visit # / Visits authorized: 10/100  DOS: 5/3/2022  PTA Visit #:  5    Time In: 7:07 am  Time Out: 8:00 am  Total Billable Time: 53 minutes    SUBJECTIVE     Pt reports: she is very sore in bilateral shoulders and arms due to coming 2 consecutive days.  She was compliant with home exercise program.  Response to previous treatment: soreness  Functional change: it is getting easier to dress herself.     Pain: 0/10  Location: right shoulder/ bicep region    OBJECTIVE     Objective Measures updated at progress report unless specified.   117 degrees flexion, 48 external rotation in adduction and internal rotation to L5 on 7/27/2022    Treatment     Isaiah received the treatments listed below:      therapeutic exercises to develop strength, ROM and flexibility for 15 minutes including:  UBE x 5 minutes   Supine cane flexion 10 x 10 second hold (had to modify to press then flexion due to soreness/weakness, plan to readjust next visit)  Rows with 2 plates x 20 each high and low (added to home exercise program with blue theraband)  Wall cane flexion x 10 with 10 second hold (with cues to relax upper traps)  Wall corner stretch 3 x 30 second hold (added to home exercise program)    manual therapy techniques: Joint mobilizations, Manual traction, Myofacial release and Soft tissue Mobilization were applied to the: right shoulder for 23 minutes,  including:  Myofascial release to right upper arm  Passive range of motion with stretching into end-ranges of flexion, internal rotation, and external rotation     neuromuscular re-education activities to improve: Coordination, Proprioception and Posture for 15 minutes. The following activities were included:  Scapular proprioceptive neuromuscular facilitation 3 x 10  Sidelying external rotation with manual resist 3 x 10  Sidelying horizontal abduction with cues 2 x 10  Retraction with extension with blue theraband x 20 with 5 second hold   External rotation with yellow theraband     Patient Education and Home Exercises     Home Exercises Provided and Patient Education Provided     Education provided:   - discussed using ice twice a day for shoulder and elbow     Written Home Exercises Provided: Patient instructed to cont prior HEP. Exercises were reviewed and Isaiah was able to demonstrate them prior to the end of the session.  Isaiah demonstrated good  understanding of the education provided. See EMR under Patient Instructions for exercises provided during therapy sessions    ASSESSMENT     Isaiah is a 66 y.o. female referred to outpatient Physical Therapy with a medical diagnosis of s/p right rotator cuff repair. Pt presents with typical postop symptoms of pain, decreased range of motion, poor scapular stability, decreased right shoulder strength and decreased functional use of right upper extremity. 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     Isaiah is now 12 weeks post-op. She continues to be hesitant to push into stretches due to pulling in muscles but her active range of motion has improved. Added to home exercise program to help patient focus on correcting posture and gaining scapular stability.     Isaiah Is progressing towards her goals.   Pt prognosis is Good.     Pt will continue to benefit from skilled outpatient physical therapy to address the deficits listed in the problem list box on initial  evaluation, provide pt/family education and to maximize pt's level of independence in the home and community environment.     Pt's spiritual, cultural and educational needs considered and pt agreeable to plan of care and goals.     Anticipated barriers to physical therapy: none    Goals:   Short Term Goals: 4 weeks  1. Patient will be independent with home exercise program.  2. Patient will have passive range of motion as follows - 120 degrees flexion, 45 degrees external rotation and internal rotation.  3. Patient will be independent with dressing and showering.  4. Patient will be able to sleep all night in bed without waking due to right shoulder.     Long Term Goals: 8 weeks  1. Patient will have active range of motion as follow - 140 degrees flexion, 60 degrees external rotation and functional internal rotation to T10 for reaching overhead, behind head and behind back for dressing, grooming and hygiene.  2. Patient will have 4+/5 strength right shoulder/upper extremity to perform household chores and work related tasks.  3. Patient will place 3# dumbbell on head height shelf without hiking or pain left shoulder.  4. 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         PLAN     Plan of care Certification: 6/20/2022 to 8/12/2022.     Continue Plan of Care for Outpatient Physical Therapy 3 times weekly for 4 weeks, then 2 times weekly for 4 weeks to include the following interventions: Electrical Stimulation IFC/premod/hivolt as needed, Manual Therapy, Moist Heat/ Ice, Neuromuscular Re-ed, Patient Education, Therapeutic Activities, Therapeutic Exercise and Ultrasound.       Maggie Hernandez, PTA

## 2022-08-03 ENCOUNTER — CLINICAL SUPPORT (OUTPATIENT)
Dept: REHABILITATION | Facility: HOSPITAL | Age: 67
End: 2022-08-03
Payer: COMMERCIAL

## 2022-08-03 DIAGNOSIS — Z98.890 S/P ROTATOR CUFF REPAIR: ICD-10-CM

## 2022-08-03 DIAGNOSIS — M25.511 ACUTE PAIN OF RIGHT SHOULDER: ICD-10-CM

## 2022-08-03 DIAGNOSIS — M25.611 DECREASED ROM OF RIGHT SHOULDER: Primary | ICD-10-CM

## 2022-08-03 PROCEDURE — 97140 MANUAL THERAPY 1/> REGIONS: CPT | Mod: CQ

## 2022-08-03 PROCEDURE — 97110 THERAPEUTIC EXERCISES: CPT | Mod: CQ

## 2022-08-03 PROCEDURE — 97112 NEUROMUSCULAR REEDUCATION: CPT | Mod: CQ

## 2022-08-03 NOTE — PROGRESS NOTES
[facility]   Physical Therapy Progress Note     Name: Isaiah Neil  Clinic Number: [id number]    Therapy Diagnosis:   Encounter Diagnoses   Name Primary?    Decreased ROM of right shoulder Yes    S/P rotator cuff repair     Acute pain of right shoulder      Physician: Davis Dunn MD    Visit Date: 8/3/2022    Physician Orders: PT Eval and Treat   Medical Diagnosis from Referral: s/p right rotator cuff repair   Evaluation Date: 6/20/2022  Authorization Period Expiration: 12/31/2022  Plan of Care Expiration: 8/12/2022  Progress Note Due:   Visit # / Visits authorized: 11/100  DOS: 5/3/2022  PTA Visit #:      Time In: 7:05 am  Time Out: 7:14 am  Total Billable Time: 9 minutes    SUBJECTIVE     Pt reports: she is doing better overall.  She was compliant with home exercise program.  Response to previous treatment: soreness  Functional change: it is getting easier to dress herself.     Pain: 0/10  Location: right shoulder/ bicep region    OBJECTIVE     Objective Measures updated at progress report unless specified.   110 degrees flexion, 40 external rotation in adduction and internal rotation to ischial tuberosity on 8/3/2022 which is a decrease from her measurements on 7/27/2022    Treatment     Co-treatment with Maggie Hernandez PTA for supervisory visit. See her daily note for today's physical therapy treatment.    Isaiah received the treatments listed below:      therapeutic exercises to develop strength, ROM and flexibility for 5 minutes including:  UBE x 5 minutes - KE  Supine cane flexion 10 x 10 second hold (had to modify to press then flexion due to soreness/weakness, plan to readjust next visit)  Rows with 2 plates x 20 each high and low (added to home exercise program with blue theraband)  Wall cane flexion x 10 with 10 second hold (with cues to relax upper traps)  Wall corner stretch 3 x 30 second hold (added to home exercise program)    manual therapy techniques: Joint  mobilizations, Manual traction, Myofacial release and Soft tissue Mobilization were applied to the: right shoulder for 0 minutes, including:  Myofascial release to right upper arm  Passive range of motion with stretching into end-ranges of flexion, internal rotation, and external rotation     neuromuscular re-education activities to improve: Coordination, Proprioception and Posture for 0 minutes. The following activities were included:  Scapular proprioceptive neuromuscular facilitation 3 x 10  Sidelying external rotation with manual resist 3 x 10  Sidelying horizontal abduction with cues 2 x 10  Retraction with extension with blue theraband x 20 with 5 second hold   External rotation with yellow theraband     Patient Education and Home Exercises     Home Exercises Provided and Patient Education Provided     Education provided:   - discussed using ice twice a day for shoulder and elbow     Written Home Exercises Provided: Patient instructed to cont prior HEP. Exercises were reviewed and Isaiah was able to demonstrate them prior to the end of the session.  Isaiah demonstrated good  understanding of the education provided. See EMR under Patient Instructions for exercises provided during therapy sessions    ASSESSMENT     Isaiah is a 66 y.o. female referred to outpatient Physical Therapy with a medical diagnosis of s/p right rotator cuff repair. Pt presents with typical postop symptoms of pain, decreased range of motion, poor scapular stability, decreased right shoulder strength and decreased functional use of right upper extremity. 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     Isaiah is now 13 weeks post-op. She continues to be hesitant to push into stretches due to pulling in muscles but her active range of motion has improved. She states she still has to have some assistance with putting on shirts that go over her head. She says the shoulder still wakes her occasionally at night but not every night. She reports  reaching behind her back is the most difficult motion for her. Supervisory visit performed today with Maggie Hernandez PTA. Progress toward goals were assessed with patient and updated in red below including range of motion measurements.     Isaiah Is progressing towards her goals.   Pt prognosis is Good.     Pt will continue to benefit from skilled outpatient physical therapy to address the deficits listed in the problem list box on initial evaluation, provide pt/family education and to maximize pt's level of independence in the home and community environment.     Pt's spiritual, cultural and educational needs considered and pt agreeable to plan of care and goals.     Anticipated barriers to physical therapy: none    Goals:   Short Term Goals: 4 weeks  1. Patient will be independent with home exercise program. MET  2. Patient will have passive range of motion as follows - 120 degrees flexion, 45 degrees external rotation and internal rotation. MET  3. Patient will be independent with dressing and showering. IMPROVING - still requires assistance getting shirts started that go over her head  4. Patient will be able to sleep all night in bed without waking due to right shoulder. IMPROVING - says it wakes her occasionally but not every night     Long Term Goals: 8 weeks  1. Patient will have active range of motion as follow - 140 degrees flexion, 60 degrees external rotation and functional internal rotation to T10 for reaching overhead, behind head and behind back for dressing, grooming and hygiene. 110 degrees flexion, 40 degrees external rotation in adduction and internal rotation to ischial tuberosity  2. Patient will have 4+/5 strength right shoulder/upper extremity to perform household chores and work related tasks. NOT MET  3. Patient will place 3# dumbbell on head height shelf without hiking or pain left shoulder. NOT MET  4. 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 NOT RELEASED         PLAN     Plan of  care Certification: 6/20/2022 to 8/12/2022.     Continue Plan of Care for Outpatient Physical Therapy 3 times weekly for 4 weeks, then 2 times weekly for 4 weeks to include the following interventions: Electrical Stimulation IFC/premod/hivolt as needed, Manual Therapy, Moist Heat/ Ice, Neuromuscular Re-ed, Patient Education, Therapeutic Activities, Therapeutic Exercise and Ultrasound.       PABLO BAEZ, PT

## 2022-08-03 NOTE — PROGRESS NOTES
[facility]   Physical Therapy Treatment Note     Name: Isaiah Neil  Clinic Number: [id number]    Therapy Diagnosis:   Encounter Diagnoses   Name Primary?    Decreased ROM of right shoulder Yes    S/P rotator cuff repair     Acute pain of right shoulder      Physician: Davis Dunn MD    Visit Date: 8/3/2022    Physician Orders: PT Eval and Treat   Medical Diagnosis from Referral: s/p right rotator cuff repair   Evaluation Date: 6/20/2022  Authorization Period Expiration: 12/31/2022  Plan of Care Expiration: 8/12/2022  Progress Note Due: 7/20/2022  Visit # / Visits authorized: 11/100  DOS: 5/3/2022  PTA Visit #:  1    Time In: 7:14 am  Time Out: 7:53 am  Total Billable Time: 39 minutes    SUBJECTIVE     Pt reports: she has not done any shoulder exercises since last visit because she was sore.  She was noncompliant with home exercise program.  Response to previous treatment: soreness  Functional change: it is getting easier to dress herself.     Pain: 0/10  Location: right shoulder/ bicep region    OBJECTIVE     Objective Measures updated at progress report unless specified.   117 degrees flexion, 48 external rotation in adduction and internal rotation to L5 on 7/27/2022    Treatment     Isaiah received the treatments listed below:      therapeutic exercises to develop strength, ROM and flexibility for 15 minutes including:  UBE x 5 minutes   Supine cane flexion 10 x 10 second hold   Supine cane flexion with green theraband for resistance on lowering x 10  Rows with 2 plates x 20 each high and low (added to home exercise program with blue theraband)  Wall cane flexion x 10 with 10 second hold (with cues to relax upper traps)  Wall corner stretch 3 x 30 second hold (patient performed incorrectly despite cues)    manual therapy techniques: Joint mobilizations, Manual traction, Myofacial release and Soft tissue Mobilization were applied to the: right shoulder for 9 minutes,  including:  Myofascial release to right upper arm  Passive range of motion with stretching into end-ranges of flexion, internal rotation, and external rotation     neuromuscular re-education activities to improve: Coordination, Proprioception and Posture for 15 minutes. The following activities were included:  Scapular proprioceptive neuromuscular facilitation 3 x 10  Sidelying external rotation with manual resist 3 x 10  Sidelying horizontal abduction with cues 2 x 10  Retraction with extension with blue theraband x 20  Wall angels static 10 x 10 second hold   External rotation wall taps to encourage more active external rotation x 20    Patient Education and Home Exercises     Home Exercises Provided and Patient Education Provided     Education provided:   - discussed using ice twice a day for shoulder and elbow     Written Home Exercises Provided: Patient instructed to cont prior HEP. Exercises were reviewed and Isaiah was able to demonstrate them prior to the end of the session.  Isaiah demonstrated good  understanding of the education provided. See EMR under Patient Instructions for exercises provided during therapy sessions    ASSESSMENT     Isaiah is a 66 y.o. female referred to outpatient Physical Therapy with a medical diagnosis of s/p right rotator cuff repair. Pt presents with typical postop symptoms of pain, decreased range of motion, poor scapular stability, decreased right shoulder strength and decreased functional use of right upper extremity. 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     Isaiah is now 13 weeks post-op. She admits poor compliance with home exercise program. She remains apprehensive/guarded with all motion. She needs constant supervision to correct exercises and even then she does not consistently perform them correctly. She would benefit from scheduling her visits at least a day apart and becoming compliant with home exercise program.     Isaiah Is progressing towards her goals.    Pt prognosis is Good.     Pt will continue to benefit from skilled outpatient physical therapy to address the deficits listed in the problem list box on initial evaluation, provide pt/family education and to maximize pt's level of independence in the home and community environment.     Pt's spiritual, cultural and educational needs considered and pt agreeable to plan of care and goals.     Anticipated barriers to physical therapy: none    Goals:   Short Term Goals: 4 weeks  1. Patient will be independent with home exercise program.  2. Patient will have passive range of motion as follows - 120 degrees flexion, 45 degrees external rotation and internal rotation.  3. Patient will be independent with dressing and showering.  4. Patient will be able to sleep all night in bed without waking due to right shoulder.     Long Term Goals: 8 weeks  1. Patient will have active range of motion as follow - 140 degrees flexion, 60 degrees external rotation and functional internal rotation to T10 for reaching overhead, behind head and behind back for dressing, grooming and hygiene.  2. Patient will have 4+/5 strength right shoulder/upper extremity to perform household chores and work related tasks.  3. Patient will place 3# dumbbell on head height shelf without hiking or pain left shoulder.  4. 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         PLAN     Plan of care Certification: 6/20/2022 to 8/12/2022.     Continue Plan of Care for Outpatient Physical Therapy 3 times weekly for 4 weeks, then 2 times weekly for 4 weeks to include the following interventions: Electrical Stimulation IFC/premod/hivolt as needed, Manual Therapy, Moist Heat/ Ice, Neuromuscular Re-ed, Patient Education, Therapeutic Activities, Therapeutic Exercise and Ultrasound.       Maggie Hernandez, PTA

## 2022-08-04 ENCOUNTER — CLINICAL SUPPORT (OUTPATIENT)
Dept: REHABILITATION | Facility: HOSPITAL | Age: 67
End: 2022-08-04
Payer: COMMERCIAL

## 2022-08-04 DIAGNOSIS — M25.511 ACUTE PAIN OF RIGHT SHOULDER: ICD-10-CM

## 2022-08-04 DIAGNOSIS — M25.611 DECREASED ROM OF RIGHT SHOULDER: Primary | ICD-10-CM

## 2022-08-04 DIAGNOSIS — Z98.890 S/P ROTATOR CUFF REPAIR: ICD-10-CM

## 2022-08-04 PROCEDURE — 97140 MANUAL THERAPY 1/> REGIONS: CPT

## 2022-08-04 PROCEDURE — 97112 NEUROMUSCULAR REEDUCATION: CPT

## 2022-08-04 PROCEDURE — 97110 THERAPEUTIC EXERCISES: CPT

## 2022-08-04 NOTE — PROGRESS NOTES
[facility]   Physical Therapy Treatment Note     Name: Isaiah Neil  Clinic Number: [id number]    Therapy Diagnosis:   Encounter Diagnoses   Name Primary?    Decreased ROM of right shoulder Yes    S/P rotator cuff repair     Acute pain of right shoulder      Physician: Davis Dunn MD    Visit Date: 8/4/2022    Physician Orders: PT Eval and Treat   Medical Diagnosis from Referral: s/p right rotator cuff repair   Evaluation Date: 6/20/2022  Authorization Period Expiration: 12/31/2022  Plan of Care Expiration: 8/12/2022  Progress Note Due: 7/20/2022  Visit # / Visits authorized: 12/100  DOS: 5/3/2022  PTA Visit #:      Time In: 7:05 am  Time Out: 749 am  Total Billable Time: 44 minutes    SUBJECTIVE     Pt reports: she has not done any shoulder exercises since last visit because she was sore.  She was noncompliant with home exercise program.  Response to previous treatment: soreness  Functional change: it is getting easier to dress herself.     Pain: 0/10  Location: right shoulder/ bicep region    OBJECTIVE     Objective Measures updated at progress report unless specified.   118 degrees flexion, 55 external rotation in adduction and internal rotation to PSIS with assistance of other hand - 8/4    Treatment     Isaiah received the treatments listed below:      therapeutic exercises to develop strength, ROM and flexibility for 10 minutes including:  UBE x 5 minutes   Pulleys x 3 minutes  Supine cane flexion --   Supine cane flexion with green theraband for resistance on lowering --  Rows with 2 plates x 30 - low (added to home exercise program with blue theraband)  Wall corner stretch - unable to perform due to complaints of pain in left elbow/bicep    manual therapy techniques: Joint mobilizations, Manual traction, Myofacial release and Soft tissue Mobilization were applied to the: right shoulder for 7 minutes, including:  Myofascial release to right upper arm - NTV  Passive  range of motion with stretching into end-ranges of internal rotation and external rotation in supine; passive range of motion into internal rotation and extension in left sidelying     neuromuscular re-education activities to improve: Coordination, Proprioception and Posture for 21 minutes. The following activities were included:  Scapular proprioceptive neuromuscular facilitation x 15  Sidelying external rotation 3 x 10  Sidelying scaption x 20  Sidelying horizontal abduction with cues --  Retraction with extension with blue theraband 3 x 10  Wall angels static --  External rotation wall taps to encourage more active external rotation --  Bilateral external rotation w/ theraband - yellow 2 x 10  SLERRS 3 X 15 seconds  SLOCRS 3 x 15 seconds    Therapeutic activities to improve functional mobility for 6 minutes, including:  Wall cane flexion x 10  Shoulder press 1 plate x 10    Patient Education and Home Exercises     Home Exercises Provided and Patient Education Provided     Education provided:   - discussed using ice twice a day for shoulder and elbow     Written Home Exercises Provided: Patient instructed to cont prior HEP. Exercises were reviewed and Isaiah was able to demonstrate them prior to the end of the session.  Isaiah demonstrated good  understanding of the education provided. See EMR under Patient Instructions for exercises provided during therapy sessions    ASSESSMENT     Isaiah is a 66 y.o. female referred to outpatient Physical Therapy with a medical diagnosis of s/p right rotator cuff repair. Pt presents with typical postop symptoms of pain, decreased range of motion, poor scapular stability, decreased right shoulder strength and decreased functional use of right upper extremity. 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     Isaiah is now 13 weeks post-op. She was complaining of pain in left elbow/bicep today. She said she had an MRI on the left side too and the doctor told her she just had  tendonitis. She admits poor compliance with home exercise program. She remains apprehensive/guarded with all motion. She needs constant supervision to correct exercises and even then she does not consistently perform them correctly. She would benefit from scheduling her visits at least a day apart and becoming compliant with home exercise program.     Isaiah Is progressing towards her goals.   Pt prognosis is Good.     Pt will continue to benefit from skilled outpatient physical therapy to address the deficits listed in the problem list box on initial evaluation, provide pt/family education and to maximize pt's level of independence in the home and community environment.     Pt's spiritual, cultural and educational needs considered and pt agreeable to plan of care and goals.     Anticipated barriers to physical therapy: none    Goals:   Short Term Goals: 4 weeks  1. Patient will be independent with home exercise program.  2. Patient will have passive range of motion as follows - 120 degrees flexion, 45 degrees external rotation and internal rotation.  3. Patient will be independent with dressing and showering.  4. Patient will be able to sleep all night in bed without waking due to right shoulder.     Long Term Goals: 8 weeks  1. Patient will have active range of motion as follow - 140 degrees flexion, 60 degrees external rotation and functional internal rotation to T10 for reaching overhead, behind head and behind back for dressing, grooming and hygiene.  2. Patient will have 4+/5 strength right shoulder/upper extremity to perform household chores and work related tasks.  3. Patient will place 3# dumbbell on head height shelf without hiking or pain left shoulder.  4. 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         PLAN     Plan of care Certification: 6/20/2022 to 8/12/2022.     Continue Plan of Care for Outpatient Physical Therapy 3 times weekly for 4 weeks, then 2 times weekly for 4 weeks to include the  following interventions: Electrical Stimulation IFC/premod/hivolt as needed, Manual Therapy, Moist Heat/ Ice, Neuromuscular Re-ed, Patient Education, Therapeutic Activities, Therapeutic Exercise and Ultrasound.       PABLO BAEZ, PT

## 2022-08-09 ENCOUNTER — CLINICAL SUPPORT (OUTPATIENT)
Dept: REHABILITATION | Facility: HOSPITAL | Age: 67
End: 2022-08-09
Payer: COMMERCIAL

## 2022-08-09 DIAGNOSIS — Z98.890 S/P ROTATOR CUFF REPAIR: ICD-10-CM

## 2022-08-09 DIAGNOSIS — M25.611 DECREASED ROM OF RIGHT SHOULDER: Primary | ICD-10-CM

## 2022-08-09 DIAGNOSIS — M25.511 ACUTE PAIN OF RIGHT SHOULDER: ICD-10-CM

## 2022-08-09 PROCEDURE — 97140 MANUAL THERAPY 1/> REGIONS: CPT | Mod: CQ

## 2022-08-09 PROCEDURE — 97110 THERAPEUTIC EXERCISES: CPT | Mod: CQ

## 2022-08-09 PROCEDURE — 97112 NEUROMUSCULAR REEDUCATION: CPT | Mod: CQ

## 2022-08-09 NOTE — PROGRESS NOTES
[facility]   Physical Therapy Treatment Note     Name: Isaiah Neil  Clinic Number: [id number]    Therapy Diagnosis:   Encounter Diagnoses   Name Primary?    Decreased ROM of right shoulder Yes    S/P rotator cuff repair     Acute pain of right shoulder      Physician: Davis Dunn MD    Visit Date: 8/9/2022    Physician Orders: PT Eval and Treat   Medical Diagnosis from Referral: s/p right rotator cuff repair   Evaluation Date: 6/20/2022  Authorization Period Expiration: 12/31/2022  Plan of Care Expiration: 8/12/2022  Progress Note Due: 7/20/2022  Visit # / Visits authorized: 13/100  DOS: 5/3/2022  PTA Visit #:  1    Time In: 8:40 am  Time Out: 9:18 am  Total Billable Time: 38 minutes    SUBJECTIVE     Pt reports: she wonders if she is behind with range of motion.  She was noncompliant with home exercise program.  Response to previous treatment: soreness  Functional change: it is getting easier to dress herself.     Pain: 0/10  Location: right shoulder/ bicep region    OBJECTIVE     Objective Measures updated at progress report unless specified.   118 degrees flexion, 55 external rotation in adduction and internal rotation to PSIS with assistance of other hand - 8/4    Treatment     Isaiah received the treatments listed below:      therapeutic exercises to develop strength, ROM and flexibility for 10 minutes including:  UBE x 5 minutes   Pulleys   Supine cane flexion with 2 lbs x 10 with 10 second hold    Supine cane flexion with green theraband for resistance on lowering   Rows with 2 plates x 30 - low   Wall corner stretch - unable to perform due to complaints of pain in left elbow/bicep    manual therapy techniques: Joint mobilizations, Manual traction, Myofacial release and Soft tissue Mobilization were applied to the: right shoulder for 10 minutes, including:  Passive range of motion with stretching into end-ranges of internal rotation and external rotation in supine;  passive range of motion into internal rotation and extension in left sidelying     neuromuscular re-education activities to improve: Coordination, Proprioception and Posture for 15 minutes. The following activities were included:  Scapular proprioceptive neuromuscular facilitation x 15  Sidelying external rotation 3 x 10  Sidelying scaption x 30  Retraction with extension with blue theraband 3 x 10  Bilateral external rotation w/ theraband - yellow   SLERRS 3 X 15 seconds  SLOCRS 3 x 15 seconds    Therapeutic activities to improve functional mobility for 3 minutes, including:  Wall cane flexion x 10  Shoulder press 1 plate x 10    Patient Education and Home Exercises     Home Exercises Provided and Patient Education Provided     Education provided:   - discussed using ice twice a day for shoulder and elbow     Written Home Exercises Provided: Patient instructed to cont prior HEP. Exercises were reviewed and Isaiah was able to demonstrate them prior to the end of the session.  Isaiah demonstrated good  understanding of the education provided. See EMR under Patient Instructions for exercises provided during therapy sessions    ASSESSMENT     Isaiah is a 66 y.o. female referred to outpatient Physical Therapy with a medical diagnosis of s/p right rotator cuff repair. Pt presents with typical postop symptoms of pain, decreased range of motion, poor scapular stability, decreased right shoulder strength and decreased functional use of right upper extremity. 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     Isaiah is now 14 weeks post-op. She tolerated stretching better today and said she realized she really needed to work to regain motion. She reported at end of treatment that she felt good, like she had stretched. She still has some difficulty with form but maintained it better after corrections today.     Isaiah Is progressing towards her goals.   Pt prognosis is Good.     Pt will continue to benefit from skilled  outpatient physical therapy to address the deficits listed in the problem list box on initial evaluation, provide pt/family education and to maximize pt's level of independence in the home and community environment.     Pt's spiritual, cultural and educational needs considered and pt agreeable to plan of care and goals.     Anticipated barriers to physical therapy: none    Goals:   Short Term Goals: 4 weeks  1. Patient will be independent with home exercise program.  2. Patient will have passive range of motion as follows - 120 degrees flexion, 45 degrees external rotation and internal rotation.  3. Patient will be independent with dressing and showering.  4. Patient will be able to sleep all night in bed without waking due to right shoulder.     Long Term Goals: 8 weeks  1. Patient will have active range of motion as follow - 140 degrees flexion, 60 degrees external rotation and functional internal rotation to T10 for reaching overhead, behind head and behind back for dressing, grooming and hygiene.  2. Patient will have 4+/5 strength right shoulder/upper extremity to perform household chores and work related tasks.  3. Patient will place 3# dumbbell on head height shelf without hiking or pain left shoulder.  4. 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         PLAN     Plan of care Certification: 6/20/2022 to 8/12/2022.     Continue Plan of Care for Outpatient Physical Therapy 3 times weekly for 4 weeks, then 2 times weekly for 4 weeks to include the following interventions: Electrical Stimulation IFC/premod/hivolt as needed, Manual Therapy, Moist Heat/ Ice, Neuromuscular Re-ed, Patient Education, Therapeutic Activities, Therapeutic Exercise and Ultrasound.       Maggie Hernandez, PTA

## 2022-08-11 ENCOUNTER — CLINICAL SUPPORT (OUTPATIENT)
Dept: REHABILITATION | Facility: HOSPITAL | Age: 67
End: 2022-08-11
Payer: COMMERCIAL

## 2022-08-11 DIAGNOSIS — M25.511 ACUTE PAIN OF RIGHT SHOULDER: ICD-10-CM

## 2022-08-11 DIAGNOSIS — M25.611 DECREASED ROM OF RIGHT SHOULDER: Primary | ICD-10-CM

## 2022-08-11 DIAGNOSIS — Z98.890 S/P ROTATOR CUFF REPAIR: ICD-10-CM

## 2022-08-11 PROCEDURE — 97530 THERAPEUTIC ACTIVITIES: CPT

## 2022-08-11 PROCEDURE — 97112 NEUROMUSCULAR REEDUCATION: CPT

## 2022-08-11 NOTE — PROGRESS NOTES
[facility]   Physical Therapy Treatment Note     Name: Isaiah Neil  Clinic Number: [id number]    Therapy Diagnosis:   Encounter Diagnoses   Name Primary?    Decreased ROM of right shoulder Yes    S/P rotator cuff repair     Acute pain of right shoulder      Physician: Davis Dunn MD    Visit Date: 8/11/2022    Physician Orders: PT Eval and Treat   Medical Diagnosis from Referral: s/p right rotator cuff repair   Evaluation Date: 6/20/2022  Authorization Period Expiration: 12/31/2022  Plan of Care Expiration: 9/9/2022  Progress Note Due:   Visit # / Visits authorized: 14/100  DOS: 5/3/2022  PTA Visit #:      Time In: 8:34 am  Time Out: 908 am  Total Billable Time: 34 minutes    SUBJECTIVE     Pt reports: she denies any pain this morning  She was noncompliant with home exercise program.  Response to previous treatment: soreness  Functional change: it is getting easier to dress herself.     Pain: 0/10  Location: right shoulder/ bicep region    OBJECTIVE     Objective Measures updated at progress report unless specified.   118 degrees flexion, 55 external rotation in adduction and internal rotation to PSIS with assistance of other hand - 8/4    Treatment     Isaiah received the treatments listed below:      therapeutic exercises to develop strength, ROM and flexibility for 12 minutes including:  UBE x 5 minutes   Pulleys x 4 minutes  Supine cane flexion with 2 lbs x 10 with 10 second hold    Supine cane flexion with green theraband for resistance on lowering   Rows with 2 plates x 30 - low   Wall corner stretch - unable to perform due to complaints of pain in left elbow/bicep    manual therapy techniques: Joint mobilizations, Manual traction, Myofacial release and Soft tissue Mobilization were applied to the: right shoulder for 0 minutes, including:  Passive range of motion with stretching into end-ranges of internal rotation and external rotation in supine; passive range of  motion into internal rotation and extension in left sidelying     neuromuscular re-education activities to improve: Coordination, Proprioception and Posture for 12 minutes. The following activities were included:  Scapular proprioceptive neuromuscular facilitation   Sidelying external rotation 1# 3 x 10  Sidelying scaption   Retraction with extension with blue theraband 3 x 10  Bilateral external rotation w/ theraband - red 3 x 10  SLERRS   SLOCRS 3 x 20 seconds    Therapeutic activities to improve functional mobility for 10 minutes, including:  Wall cane flexion x 15  Shoulder press 1 plate x 20  Close  cables 1 plate 10 x 10 second hold     Patient Education and Home Exercises     Home Exercises Provided and Patient Education Provided     Education provided:   - discussed using ice twice a day for shoulder and elbow     Written Home Exercises Provided: Patient instructed to cont prior HEP. Exercises were reviewed and Isaiah was able to demonstrate them prior to the end of the session.  Isaiah demonstrated good  understanding of the education provided. See EMR under Patient Instructions for exercises provided during therapy sessions    ASSESSMENT     Isaiah is a 66 y.o. female referred to outpatient Physical Therapy with a medical diagnosis of s/p right rotator cuff repair. Pt presents with typical postop symptoms of pain, decreased range of motion, poor scapular stability, decreased right shoulder strength and decreased functional use of right upper extremity. 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     Isaiah was able to increase weight and/or reps with exercises today. She felt tired at treatment end but had no increase in pain. She still requires cueing for some exercises for proper form. Requires encouragement to push flexion range of motion to end range. Plan of care updated today. See progress toward goals below in red.     Isaiah Is progressing towards her goals.   Pt prognosis is Good.     Pt  will continue to benefit from skilled outpatient physical therapy to address the deficits listed in the problem list box on initial evaluation, provide pt/family education and to maximize pt's level of independence in the home and community environment.     Pt's spiritual, cultural and educational needs considered and pt agreeable to plan of care and goals.     Anticipated barriers to physical therapy: none    Goals:   Short Term Goals: 4 weeks  1. Patient will be independent with home exercise program. MET - she was not as compliant in the beginning but says she is doing better now  2. Patient will have passive range of motion as follows - 120 degrees flexion, 45 degrees external rotation and internal rotation.MET   3. Patient will be independent with dressing and showering. MET  4. Patient will be able to sleep all night in bed without waking due to right shoulder. IMPROVING - does not wake her as often     Long Term Goals: 8 weeks  1. Patient will have active range of motion as follow - 140 degrees flexion, 60 degrees external rotation and functional internal rotation to T10 for reaching overhead, behind head and behind back for dressing, grooming and hygiene. 120 degrees flexion, 55 degrees external rotation, and internal rotation to PSIS  2. Patient will have 4+/5 strength right shoulder/upper extremity to perform household chores and work related tasks. NOT MET  3. Patient will place 3# dumbbell on head height shelf without hiking or pain left shoulder. NOT MET  4. 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 NOT MET         PLAN     Updated Certification Period: 8/11/2022 to 9/9/2022  Recommended Treatment Plan: 2 times per week for 4 weeks: Electrical Stimulation IFC/premod/hivolt as needed, Manual Therapy, Moist Heat/ Ice, Neuromuscular Re-ed, Patient Education, Therapeutic Activities and Therapeutic Exercise  Other Recommendations: continue to encourage patient to push range of motion at  home      PABLO BAEZ, PT

## 2022-08-11 NOTE — PLAN OF CARE
[facility]   Physical Therapy Updated Plan of Care    Name: Isaiah Neil  Clinic Number: [id number]    Therapy Diagnosis:   Encounter Diagnoses   Name Primary?    Decreased ROM of right shoulder Yes    S/P rotator cuff repair     Acute pain of right shoulder      Physician: Davis Dunn MD    Visit Date: 8/11/2022    Physician Orders: PT Eval and Treat   Medical Diagnosis from Referral: s/p right rotator cuff repair   Evaluation Date: 6/20/2022  Authorization Period Expiration: 12/31/2022  Plan of Care Expiration: 9/9/2022  Progress Note Due:   Visit # / Visits authorized: 14/100  DOS: 5/3/2022      Reasons for Recertification of Therapy: Isaiah was able to increase weight and/or reps with exercises today. She felt tired at treatment end but had no increase in pain. She still requires cueing for some exercises for proper form. Requires encouragement to push flexion range of motion to end range. She needs continued work on range of motion and strength. Plan of care updated today. See progress toward goals below in red.      Goals:   Short Term Goals: 4 weeks  1. Patient will be independent with home exercise program. MET - she was not as compliant in the beginning but says she is doing better now  2. Patient will have passive range of motion as follows - 120 degrees flexion, 45 degrees external rotation and internal rotation.MET   3. Patient will be independent with dressing and showering. MET  4. Patient will be able to sleep all night in bed without waking due to right shoulder. IMPROVING - does not wake her as often     Long Term Goals: 8 weeks  1. Patient will have active range of motion as follow - 140 degrees flexion, 60 degrees external rotation and functional internal rotation to T10 for reaching overhead, behind head and behind back for dressing, grooming and hygiene. 120 degrees flexion, 55 degrees external rotation, and internal rotation to PSIS  2. Patient will have  4+/5 strength right shoulder/upper extremity to perform household chores and work related tasks. NOT MET  3. Patient will place 3# dumbbell on head height shelf without hiking or pain left shoulder. NOT MET  4. 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 NOT MET      Plan     Updated Certification Period: 8/11/2022 to 9/9/2022  Recommended Treatment Plan: 2 times per week for 4 weeks: Electrical Stimulation IFC/premod/hivolt as needed, Manual Therapy, Moist Heat/ Ice, Neuromuscular Re-ed, Patient Education, Therapeutic Activities and Therapeutic Exercise  Other Recommendations: continue to encourage patient to push range of motion at home    PABLO BAEZ, PT  8/11/2022      I CERTIFY THE NEED FOR THESE SERVICES FURNISHED UNDER THIS PLAN OF TREATMENT AND WHILE UNDER MY CARE.    Physician's comments:      Physician's Signature: ___________________________________________________

## 2022-08-16 ENCOUNTER — CLINICAL SUPPORT (OUTPATIENT)
Dept: REHABILITATION | Facility: HOSPITAL | Age: 67
End: 2022-08-16
Payer: COMMERCIAL

## 2022-08-16 DIAGNOSIS — Z98.890 S/P ROTATOR CUFF REPAIR: ICD-10-CM

## 2022-08-16 DIAGNOSIS — M25.511 ACUTE PAIN OF RIGHT SHOULDER: ICD-10-CM

## 2022-08-16 DIAGNOSIS — M25.611 DECREASED ROM OF RIGHT SHOULDER: Primary | ICD-10-CM

## 2022-08-16 PROCEDURE — 97110 THERAPEUTIC EXERCISES: CPT

## 2022-08-16 PROCEDURE — 97112 NEUROMUSCULAR REEDUCATION: CPT

## 2022-08-16 PROCEDURE — 97530 THERAPEUTIC ACTIVITIES: CPT

## 2022-08-16 NOTE — PROGRESS NOTES
[facility]   Physical Therapy Treatment Note     Name: Isaiah Neil  Clinic Number: [id number]    Therapy Diagnosis:   Encounter Diagnoses   Name Primary?    Decreased ROM of right shoulder Yes    S/P rotator cuff repair     Acute pain of right shoulder      Physician: Davis Dunn MD    Visit Date: 8/16/2022    Physician Orders: PT Eval and Treat   Medical Diagnosis from Referral: s/p right rotator cuff repair   Evaluation Date: 6/20/2022  Authorization Period Expiration: 12/31/2022  Plan of Care Expiration: 9/9/2022  Progress Note Due:   Visit # / Visits authorized: 15/100  DOS: 5/3/2022  PTA Visit #:      Time In: 8:43 am  Time Out: 9:27 am  Total Billable Time: 44 minutes    SUBJECTIVE     Pt reports: she has no complaints this morning  She was noncompliant with home exercise program.  Response to previous treatment: soreness  Functional change: it is getting easier to dress herself.     Pain: 0/10  Location: right shoulder/ bicep region    OBJECTIVE     Objective Measures updated at progress report unless specified.   127 degrees flexion, 60 external rotation in adduction and internal rotation to PSIS with assistance of other hand - 8/16    Treatment     Isaiah received the treatments listed below:      therapeutic exercises to develop strength, ROM and flexibility for 20 minutes including:  UBE x 5 minutes   Pulleys 10 x 10 second hold   Supine cane flexion with 3 lbs x 20  Supine ball flexion - yellow x 20     Supine cane flexion with green theraband for resistance on lowering   Rows with 3 plates x 10 each - 3 way   Wall corner stretch - unable to perform due to complaints of pain in left elbow/bicep    manual therapy techniques: Joint mobilizations, Manual traction, Myofacial release and Soft tissue Mobilization were applied to the: right shoulder for 4 minutes, including:  Passive range of motion with stretching into end-ranges of flexion, internal rotation and  external rotation in supine    neuromuscular re-education activities to improve: Coordination, Proprioception and Posture for 12 minutes. The following activities were included:  Scapular proprioceptive neuromuscular facilitation   Sidelying external rotation 1# 3 x 10  Sidelying scaption   Retraction with extension with blue theraband 3 x 10  Bilateral external rotation w/ theraband - red 3 x 10  SLERRS   SLOCRS 3 x 20 seconds    Therapeutic activities to improve functional mobility for 8 minutes, including:  Wall cane flexion x 20  Shoulder press 1 plate x 20  Close  cables 1 plate 10 x 10 second hold     Patient Education and Home Exercises     Home Exercises Provided and Patient Education Provided     Education provided:   - discussed using ice twice a day for shoulder and elbow     Written Home Exercises Provided: Patient instructed to cont prior HEP. Exercises were reviewed and Isaiah was able to demonstrate them prior to the end of the session.  Isaiah demonstrated good  understanding of the education provided. See EMR under Patient Instructions for exercises provided during therapy sessions    ASSESSMENT     Isaiah is a 66 y.o. female referred to outpatient Physical Therapy with a medical diagnosis of s/p right rotator cuff repair. Pt presents with typical postop symptoms of pain, decreased range of motion, poor scapular stability, decreased right shoulder strength and decreased functional use of right upper extremity. 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     Isaiah is continuing to progress with strength in right shoulder. She complained of some mild pain with manual at end ranges of flexion, external rotation and internal rotation.      Isaiah Is progressing towards her goals.   Pt prognosis is Good.     Pt will continue to benefit from skilled outpatient physical therapy to address the deficits listed in the problem list box on initial evaluation, provide pt/family education and to maximize  pt's level of independence in the home and community environment.     Pt's spiritual, cultural and educational needs considered and pt agreeable to plan of care and goals.     Anticipated barriers to physical therapy: none    Goals:   Short Term Goals: 4 weeks  1. Patient will be independent with home exercise program. MET - she was not as compliant in the beginning but says she is doing better now  2. Patient will have passive range of motion as follows - 120 degrees flexion, 45 degrees external rotation and internal rotation.MET   3. Patient will be independent with dressing and showering. MET  4. Patient will be able to sleep all night in bed without waking due to right shoulder. IMPROVING - does not wake her as often     Long Term Goals: 8 weeks  1. Patient will have active range of motion as follow - 140 degrees flexion, 60 degrees external rotation and functional internal rotation to T10 for reaching overhead, behind head and behind back for dressing, grooming and hygiene. 127 degrees flexion, 60 degrees external rotation, and internal rotation to PSIS  2. Patient will have 4+/5 strength right shoulder/upper extremity to perform household chores and work related tasks. NOT MET  3. Patient will place 3# dumbbell on head height shelf without hiking or pain left shoulder. NOT MET  4. 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 NOT MET         PLAN     Updated Certification Period: 8/11/2022 to 9/9/2022  Recommended Treatment Plan: 2 times per week for 4 weeks: Electrical Stimulation IFC/premod/hivolt as needed, Manual Therapy, Moist Heat/ Ice, Neuromuscular Re-ed, Patient Education, Therapeutic Activities and Therapeutic Exercise  Other Recommendations: continue to encourage patient to push range of motion at home      PABLO BAEZ, PT

## 2022-08-18 ENCOUNTER — CLINICAL SUPPORT (OUTPATIENT)
Dept: REHABILITATION | Facility: HOSPITAL | Age: 67
End: 2022-08-18
Payer: COMMERCIAL

## 2022-08-18 DIAGNOSIS — M25.611 DECREASED ROM OF RIGHT SHOULDER: Primary | ICD-10-CM

## 2022-08-18 DIAGNOSIS — Z98.890 S/P ROTATOR CUFF REPAIR: ICD-10-CM

## 2022-08-18 DIAGNOSIS — M25.511 ACUTE PAIN OF RIGHT SHOULDER: ICD-10-CM

## 2022-08-18 PROCEDURE — 97140 MANUAL THERAPY 1/> REGIONS: CPT

## 2022-08-18 PROCEDURE — 97112 NEUROMUSCULAR REEDUCATION: CPT

## 2022-08-18 PROCEDURE — 97110 THERAPEUTIC EXERCISES: CPT

## 2022-08-18 NOTE — PROGRESS NOTES
[facility]   Physical Therapy Treatment Note     Name: Isaiah Neil  Clinic Number: [id number]    Therapy Diagnosis:   Encounter Diagnoses   Name Primary?    Decreased ROM of right shoulder Yes    S/P rotator cuff repair     Acute pain of right shoulder      Physician: Davis Dunn MD    Visit Date: 8/18/2022    Physician Orders: PT Eval and Treat   Medical Diagnosis from Referral: s/p right rotator cuff repair   Evaluation Date: 6/20/2022  Authorization Period Expiration: 12/31/2022  Plan of Care Expiration: 9/9/2022  Progress Note Due:   Visit # / Visits authorized: 16/100  DOS: 5/3/2022  PTA Visit #:      Time In: 8:39 am  Time Out: 918 am  Total Billable Time: 39 minutes    SUBJECTIVE     Pt reports: she has no pain this morning but says it hurt some last night in the middle of the night  She was noncompliant with home exercise program.  Response to previous treatment: soreness  Functional change: it is getting easier to dress herself.     Pain: 0/10  Location: right shoulder/ bicep region    OBJECTIVE     Objective Measures updated at progress report unless specified.   127 degrees flexion, 60 external rotation in adduction and internal rotation to PSIS with assistance of other hand - 8/16    Treatment     Isaiah received the treatments listed below:      therapeutic exercises to develop strength, ROM and flexibility for 12 minutes including:  UBE x 5 minutes   Pulleys --  Supine cane flexion with 3 lbs x 20  Supine ball flexion - yellow x 20     Supine cane flexion with green theraband for resistance on lowering   Rows with 3 plate -- 3 way   Wall corner stretch - unable to perform due to complaints of pain in left elbow/bicep    manual therapy techniques: Joint mobilizations, Manual traction, Myofacial release and Soft tissue Mobilization were applied to the: right shoulder for 9 minutes, including:  Passive range of motion with stretching into end-ranges of flexion,  internal rotation and external rotation in supine    neuromuscular re-education activities to improve: Coordination, Proprioception and Posture for 12 minutes. The following activities were included:  Scapular proprioceptive neuromuscular facilitation   Sidelying external rotation 2# 3 x 10  Sidelying scaption   Retraction with extension with blue theraband 3 x 10  Unilateral external rotation w/ theraband - red 3 x 10  SLERRS   SLOCRS 3 x 20 seconds    Therapeutic activities to improve functional mobility for 6 minutes, including:  Wall cane flexion x 20  Shoulder press 1 plate x 20  Close  cables 1 plate      Patient Education and Home Exercises     Home Exercises Provided and Patient Education Provided     Education provided:   - discussed using ice twice a day for shoulder and elbow     Written Home Exercises Provided: Patient instructed to cont prior HEP. Exercises were reviewed and Isaiah was able to demonstrate them prior to the end of the session.  Isaiah demonstrated good  understanding of the education provided. See EMR under Patient Instructions for exercises provided during therapy sessions    ASSESSMENT     Isaiah is a 66 y.o. female referred to outpatient Physical Therapy with a medical diagnosis of s/p right rotator cuff repair. Pt presents with typical postop symptoms of pain, decreased range of motion, poor scapular stability, decreased right shoulder strength and decreased functional use of right upper extremity. 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     Isaiah is continuing to progress with strength in right shoulder. She complained of some mild pain with manual at end ranges of flexion, external rotation and internal rotation. She is able to get 60-70 degrees internal rotation in supine but has much more difficulty functionally reaching behind back.      Isaiah Is progressing towards her goals.   Pt prognosis is Good.     Pt will continue to benefit from skilled outpatient physical  therapy to address the deficits listed in the problem list box on initial evaluation, provide pt/family education and to maximize pt's level of independence in the home and community environment.     Pt's spiritual, cultural and educational needs considered and pt agreeable to plan of care and goals.     Anticipated barriers to physical therapy: none    Goals:   Short Term Goals: 4 weeks  1. Patient will be independent with home exercise program. MET - she was not as compliant in the beginning but says she is doing better now  2. Patient will have passive range of motion as follows - 120 degrees flexion, 45 degrees external rotation and internal rotation.MET   3. Patient will be independent with dressing and showering. MET  4. Patient will be able to sleep all night in bed without waking due to right shoulder. IMPROVING - does not wake her as often     Long Term Goals: 8 weeks  1. Patient will have active range of motion as follow - 140 degrees flexion, 60 degrees external rotation and functional internal rotation to T10 for reaching overhead, behind head and behind back for dressing, grooming and hygiene. 127 degrees flexion, 60 degrees external rotation, and internal rotation to PSIS  2. Patient will have 4+/5 strength right shoulder/upper extremity to perform household chores and work related tasks. NOT MET  3. Patient will place 3# dumbbell on head height shelf without hiking or pain left shoulder. NOT MET  4. 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 NOT MET         PLAN     Updated Certification Period: 8/11/2022 to 9/9/2022  Recommended Treatment Plan: 2 times per week for 4 weeks: Electrical Stimulation IFC/premod/hivolt as needed, Manual Therapy, Moist Heat/ Ice, Neuromuscular Re-ed, Patient Education, Therapeutic Activities and Therapeutic Exercise  Other Recommendations: continue to encourage patient to push range of motion at home      PABLO BAEZ, PT

## 2022-08-24 ENCOUNTER — CLINICAL SUPPORT (OUTPATIENT)
Dept: REHABILITATION | Facility: HOSPITAL | Age: 67
End: 2022-08-24
Payer: COMMERCIAL

## 2022-08-24 DIAGNOSIS — M25.511 ACUTE PAIN OF RIGHT SHOULDER: ICD-10-CM

## 2022-08-24 DIAGNOSIS — M25.611 DECREASED ROM OF RIGHT SHOULDER: Primary | ICD-10-CM

## 2022-08-24 DIAGNOSIS — Z98.890 S/P ROTATOR CUFF REPAIR: ICD-10-CM

## 2022-08-24 PROCEDURE — 97140 MANUAL THERAPY 1/> REGIONS: CPT | Mod: CQ

## 2022-08-24 PROCEDURE — 97110 THERAPEUTIC EXERCISES: CPT | Mod: CQ

## 2022-08-24 PROCEDURE — 97112 NEUROMUSCULAR REEDUCATION: CPT | Mod: CQ

## 2022-08-24 NOTE — PROGRESS NOTES
[facility]   Physical Therapy Treatment Note     Name: Isaiah Neil  Clinic Number: [id number]    Therapy Diagnosis:   Encounter Diagnoses   Name Primary?    Decreased ROM of right shoulder Yes    S/P rotator cuff repair     Acute pain of right shoulder      Physician: Davis Dunn MD    Visit Date: 8/24/2022    Physician Orders: PT Eval and Treat   Medical Diagnosis from Referral: s/p right rotator cuff repair   Evaluation Date: 6/20/2022  Authorization Period Expiration: 12/31/2022  Plan of Care Expiration: 9/9/2022  Progress Note Due:   Visit # / Visits authorized: 17/100  DOS: 5/3/2022  PTA Visit #:  1    Time In: 8:44 am  Time Out: 9:24 am  Total Billable Time: 40 minutes    SUBJECTIVE     Pt reports: arrives late this morning. States it was raining really hard on her drive in.  She was noncompliant with home exercise program.  Response to previous treatment: soreness  Functional change: it is getting easier to dress herself.     Pain: 0/10  Location: right shoulder/ bicep region    OBJECTIVE     Objective Measures updated at progress report unless specified.   127 degrees flexion, 55 external rotation in adduction and internal rotation to L4- 8/24    Treatment     Isaiah received the treatments listed below:      therapeutic exercises to develop strength, ROM and flexibility for 12 minutes including:  UBE x 4 minutes    Pulleys --  Supine cane flexion with 3 lbs x 20  Supine ball flexion - yellow x 20      Rows with 3 plate -- 3 way   Wall corner stretch - unable to perform due to complaints of pain in left elbow/bicep    manual therapy techniques: Joint mobilizations, Manual traction, Myofacial release and Soft tissue Mobilization were applied to the: right shoulder for 8 minutes, including:  Passive range of motion with stretching into end-ranges of flexion, internal rotation and external rotation in supine    neuromuscular re-education activities to improve:  Coordination, Proprioception and Posture for 12 minutes. The following activities were included:  Scapular proprioceptive neuromuscular facilitation   Sidelying external rotation 2# 3 x 10  Retraction with extension with blue theraband 3 x 10  Unilateral external rotation w/ theraband - red 3 x 10  Unilateral internal rotation with theraband - red 3 x 10    Therapeutic activities to improve functional mobility for 8 minutes, including:  Wall cane flexion x 20  Shoulder press 1 plate x 10, 2 plates x 10  Close  cables 2 plates x 20      Patient Education and Home Exercises     Home Exercises Provided and Patient Education Provided     Education provided:   - discussed using ice twice a day for shoulder and elbow     Written Home Exercises Provided: Patient instructed to cont prior HEP. Exercises were reviewed and Isaiah was able to demonstrate them prior to the end of the session.  Isaiah demonstrated good  understanding of the education provided. See EMR under Patient Instructions for exercises provided during therapy sessions    ASSESSMENT     Isaiah is a 66 y.o. female referred to outpatient Physical Therapy with a medical diagnosis of s/p right rotator cuff repair. Pt presents with typical postop symptoms of pain, decreased range of motion, poor scapular stability, decreased right shoulder strength and decreased functional use of right upper extremity. 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     Isaiah is continuing to progress with strength in right shoulder. She is still complaining of pain in end-range stretches for flexion. She was able to add a plate to shoulder press without obvious difficulty but complained that it was hard.       Isaiah Is progressing towards her goals.   Pt prognosis is Good.     Pt will continue to benefit from skilled outpatient physical therapy to address the deficits listed in the problem list box on initial evaluation, provide pt/family education and to maximize pt's level  of independence in the home and community environment.     Pt's spiritual, cultural and educational needs considered and pt agreeable to plan of care and goals.     Anticipated barriers to physical therapy: none    Goals:   Short Term Goals: 4 weeks  1. Patient will be independent with home exercise program. MET - she was not as compliant in the beginning but says she is doing better now  2. Patient will have passive range of motion as follows - 120 degrees flexion, 45 degrees external rotation and internal rotation.MET   3. Patient will be independent with dressing and showering. MET  4. Patient will be able to sleep all night in bed without waking due to right shoulder. IMPROVING - does not wake her as often     Long Term Goals: 8 weeks  1. Patient will have active range of motion as follow - 140 degrees flexion, 60 degrees external rotation and functional internal rotation to T10 for reaching overhead, behind head and behind back for dressing, grooming and hygiene. 127 degrees flexion, 60 degrees external rotation, and internal rotation to PSIS  2. Patient will have 4+/5 strength right shoulder/upper extremity to perform household chores and work related tasks. NOT MET  3. Patient will place 3# dumbbell on head height shelf without hiking or pain left shoulder. NOT MET  4. 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 NOT MET         PLAN     Updated Certification Period: 8/11/2022 to 9/9/2022  Recommended Treatment Plan: 2 times per week for 4 weeks: Electrical Stimulation IFC/premod/hivolt as needed, Manual Therapy, Moist Heat/ Ice, Neuromuscular Re-ed, Patient Education, Therapeutic Activities and Therapeutic Exercise  Other Recommendations: continue to encourage patient to push range of motion at home      Maggie Hernandez PTA

## 2022-08-25 ENCOUNTER — CLINICAL SUPPORT (OUTPATIENT)
Dept: REHABILITATION | Facility: HOSPITAL | Age: 67
End: 2022-08-25
Payer: COMMERCIAL

## 2022-08-25 DIAGNOSIS — Z98.890 S/P ROTATOR CUFF REPAIR: ICD-10-CM

## 2022-08-25 DIAGNOSIS — M25.611 DECREASED ROM OF RIGHT SHOULDER: Primary | ICD-10-CM

## 2022-08-25 DIAGNOSIS — M25.511 ACUTE PAIN OF RIGHT SHOULDER: ICD-10-CM

## 2022-08-25 PROCEDURE — 97110 THERAPEUTIC EXERCISES: CPT

## 2022-08-25 PROCEDURE — 97530 THERAPEUTIC ACTIVITIES: CPT

## 2022-08-25 PROCEDURE — 97112 NEUROMUSCULAR REEDUCATION: CPT

## 2022-08-25 NOTE — PROGRESS NOTES
[facility]   Physical Therapy Treatment Note/Discharge Summary    Name: Isaiah Neil  Clinic Number: [id number]    Therapy Diagnosis:   Encounter Diagnoses   Name Primary?    Decreased ROM of right shoulder Yes    S/P rotator cuff repair     Acute pain of right shoulder      Physician: Davis Dunn MD    Visit Date: 8/25/2022    Physician Orders: PT Eval and Treat   Medical Diagnosis from Referral: s/p right rotator cuff repair   Evaluation Date: 6/20/2022  Authorization Period Expiration: 12/31/2022  Plan of Care Expiration: 9/9/2022  Progress Note Due:   Visit # / Visits authorized: 18/100  DOS: 5/3/2022  PTA Visit #:      Time In: 8:42 am  Time Out: 9:20 am  Total Billable Time: 38 minutes    SUBJECTIVE     Pt reports: arrives 12 minutes late again this morning  She was noncompliant with home exercise program.  Response to previous treatment: soreness  Functional change: it is getting easier to dress herself.     Pain: 0/10  Location: right shoulder/ bicep region    OBJECTIVE     Objective Measures updated at progress report unless specified.   135 degrees flexion, 55 external rotation in adduction and internal rotation to L4- 8/24    Treatment     Isaiah received the treatments listed below:      therapeutic exercises to develop strength, ROM and flexibility for 10 minutes including:  UBE x 3 minutes    Pulleys --  Supine cane flexion with 3 lbs x 20  Supine ball flexion - yellow x 20      Rows with 3 plate -- 3 way   Wall corner stretch - unable to perform due to complaints of pain in left elbow/bicep  Updated and reviewed home exercise program for d/c    manual therapy techniques: Joint mobilizations, Manual traction, Myofacial release and Soft tissue Mobilization were applied to the: right shoulder for 0 minutes, including:  Passive range of motion with stretching into end-ranges of flexion, internal rotation and external rotation in supine    neuromuscular re-education  activities to improve: Coordination, Proprioception and Posture for 20 minutes. The following activities were included:  Scapular proprioceptive neuromuscular facilitation   Sidelying external rotation 2# 3 x 10  Retraction with extension with blue theraband 3 x 10  Unilateral external rotation w/ theraband - red 3 x 10  Unilateral internal rotation with theraband - red 3 x 10    Therapeutic activities to improve functional mobility for 8 minutes, including:  Wall cane flexion x 30  Shoulder press 2 plates 3 x 10  Close  cables 2 plates x 20      Patient Education and Home Exercises     Home Exercises Provided and Patient Education Provided     Education provided:   - discussed using ice twice a day for shoulder and elbow     Written Home Exercises Provided: Patient instructed to cont prior HEP. Exercises were reviewed and Isaiah was able to demonstrate them prior to the end of the session.  Isaiah demonstrated good  understanding of the education provided. See EMR under Patient Instructions for exercises provided during therapy sessions    ASSESSMENT     Isaiah is a 66 y.o. female referred to outpatient Physical Therapy with a medical diagnosis of s/p right rotator cuff repair. Pt presents with typical postop symptoms of pain, decreased range of motion, poor scapular stability, decreased right shoulder strength and decreased functional use of right upper extremity. 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     Isaiah is seen today for her last scheduled appointment. She states she is returning to work on Monday 8/29.      Isaiah Is progressing towards her goals.   Pt prognosis is Good.     Pt will continue to benefit from skilled outpatient physical therapy to address the deficits listed in the problem list box on initial evaluation, provide pt/family education and to maximize pt's level of independence in the home and community environment.     Pt's spiritual, cultural and educational needs considered and  pt agreeable to plan of care and goals.     Anticipated barriers to physical therapy: none    Goals:   Short Term Goals: 4 weeks  1. Patient will be independent with home exercise program. MET - she was not as compliant in the beginning but says she is doing better now  2. Patient will have passive range of motion as follows - 120 degrees flexion, 45 degrees external rotation and internal rotation.MET   3. Patient will be independent with dressing and showering. MET  4. Patient will be able to sleep all night in bed without waking due to right shoulder. IMPROVING - does not wake her as often     Long Term Goals: 8 weeks  1. Patient will have active range of motion as follow - 140 degrees flexion, 60 degrees external rotation and functional internal rotation to T10 for reaching overhead, behind head and behind back for dressing, grooming and hygiene. 135 degrees flexion, 60 degrees external rotation, and internal rotation to PSIS  2. Patient will have 4+/5 strength right shoulder/upper extremity to perform household chores and work related tasks. MET  3. Patient will place 3# dumbbell on head height shelf without hiking or pain left shoulder. Able but complains of a little pain   4. 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 Released to return without restrictions on 8/29         PLAN     Discharge from physical therapy to home exercise program. Patient is released back to work without restrictions. Her home exercise program was updated and reviewed today.      PABLO BAEZ, PT   8/25/2022

## 2022-09-05 PROBLEM — Z09 POSTOP CHECK: Status: RESOLVED | Noted: 2022-06-06 | Resolved: 2022-09-05

## 2025-03-03 ENCOUNTER — HOSPITAL ENCOUNTER (EMERGENCY)
Facility: HOSPITAL | Age: 70
Discharge: HOME | End: 2025-03-03
Payer: COMMERCIAL

## 2025-03-03 VITALS
OXYGEN SATURATION: 99 % | SYSTOLIC BLOOD PRESSURE: 134 MMHG | BODY MASS INDEX: 38.89 KG/M2 | DIASTOLIC BLOOD PRESSURE: 74 MMHG | HEART RATE: 92 BPM | RESPIRATION RATE: 18 BRPM | HEIGHT: 61 IN | TEMPERATURE: 98 F | WEIGHT: 206 LBS

## 2025-03-03 DIAGNOSIS — B34.9 VIRAL SYNDROME: Primary | ICD-10-CM

## 2025-03-03 PROCEDURE — 25000003 PHARM REV CODE 250: Performed by: FAMILY MEDICINE

## 2025-03-03 PROCEDURE — 99283 EMERGENCY DEPT VISIT LOW MDM: CPT

## 2025-03-03 PROCEDURE — 25000003 PHARM REV CODE 250: Performed by: NURSE PRACTITIONER

## 2025-03-03 RX ORDER — ACETAMINOPHEN 500 MG
1000 TABLET ORAL
Status: COMPLETED | OUTPATIENT
Start: 2025-03-03 | End: 2025-03-03

## 2025-03-03 RX ORDER — ONDANSETRON 4 MG/1
4 TABLET, ORALLY DISINTEGRATING ORAL
Status: COMPLETED | OUTPATIENT
Start: 2025-03-03 | End: 2025-03-03

## 2025-03-03 RX ORDER — ONDANSETRON 4 MG/1
4 TABLET, FILM COATED ORAL EVERY 6 HOURS
Qty: 12 TABLET | Refills: 0 | Status: SHIPPED | OUTPATIENT
Start: 2025-03-03 | End: 2025-03-07

## 2025-03-03 RX ADMIN — ACETAMINOPHEN 1000 MG: 500 TABLET ORAL at 05:03

## 2025-03-03 RX ADMIN — ONDANSETRON 4 MG: 4 TABLET, ORALLY DISINTEGRATING ORAL at 07:03

## 2025-03-03 NOTE — Clinical Note
"Isaiah Armando" Rashaad was seen and treated in our emergency department on 3/3/2025.  She may return to work on 03/06/2025.       If you have any questions or concerns, please don't hesitate to call.      Tam Weaver FNP"

## 2025-03-04 NOTE — ED PROVIDER NOTES
Encounter Date: 3/3/2025       History     Chief Complaint   Patient presents with    Nausea     Pt presents to ed with c/o having chills, nausea, and headache since Saturday      69-year-old female presents to the emergency department to be evaluated for fever and generalized body aches that started yesterday.  She reports nausea with vomiting x 1 yesterday.  She has not eaten, but has had water to drink over the last 2 days.  Denies diarrhea or constipation.    The history is provided by the patient.   Fever  Primary symptoms of the febrile illness include fever, fatigue, headaches, cough, nausea, vomiting, myalgias and arthralgias. Primary symptoms do not include visual change, wheezing, shortness of breath, abdominal pain, diarrhea, dysuria, altered mental status or rash. The current episode started yesterday. This is a new problem. The problem has not changed since onset.  The maximum temperature recorded prior to her arrival was 101 to 101.9 F.   The fatigue began yesterday. The fatigue has been unchanged since its onset.   The headache began yesterday. Headache is a new problem. Location/region(s) of the headache: frontal. The headache is not associated with aura, photophobia, eye pain, visual change, neck stiffness, paresthesias, weakness or loss of balance.   The cough began yesterday. The cough is non-productive.   Nausea began yesterday. The nausea is associated with eating.   The vomiting began yesterday. Vomiting occurred once.   Myalgias began yesterday. The myalgias are generalized. The myalgias are not associated with weakness, tenderness or swelling.   Arthralgias began yesterday.     Review of patient's allergies indicates:   Allergen Reactions    Azithromycin Nausea Only    Codeine phosphate Nausea Only    Lortab [hydrocodone-acetaminophen] Nausea Only    Sulfa (sulfonamide antibiotics) Nausea Only     Past Medical History:   Diagnosis Date    Digestive disorder     Hypertension     Mixed  hyperlipidemia     Stroke     Thyroid disease      Past Surgical History:   Procedure Laterality Date     SECTION      HYSTERECTOMY      SHOULDER ARTHROSCOPY Right 5/3/2022    Procedure: ARTHROSCOPY, SHOULDER;  Surgeon: Davis Dunn MD;  Location: [facility];  Service: Orthopedics;  Laterality: Right;     Family History   Problem Relation Name Age of Onset    Breast cancer Maternal Aunt       Social History[1]  Review of Systems   Constitutional:  Positive for fatigue and fever.   HENT:  Negative for congestion, nosebleeds, postnasal drip, rhinorrhea, sinus pressure, sinus pain, sneezing, sore throat and trouble swallowing.    Eyes:  Negative for photophobia and pain.   Respiratory:  Positive for cough. Negative for shortness of breath and wheezing.    Cardiovascular:  Negative for chest pain and palpitations.   Gastrointestinal:  Positive for nausea and vomiting. Negative for abdominal pain and diarrhea.   Genitourinary:  Negative for dysuria.   Musculoskeletal:  Positive for arthralgias and myalgias. Negative for back pain, gait problem, joint swelling, neck pain and neck stiffness.   Skin:  Negative for rash.   Neurological:  Positive for headaches. Negative for weakness, paresthesias and loss of balance.       Physical Exam     Initial Vitals [25 1711]   BP Pulse Resp Temp SpO2   (!) 177/85 92 16 (!) 100.4 °F (38 °C) 96 %      MAP       --         Physical Exam    Vitals reviewed.  Constitutional: Vital signs are normal. She appears well-developed and well-nourished. She is not diaphoretic. She is Obese . She is cooperative.  Non-toxic appearance. She has a sickly appearance. She appears ill. No distress.   HENT:   Head: Normocephalic and atraumatic.   Right Ear: External ear normal.   Left Ear: External ear normal.   Nose: Nose normal. Mouth/Throat: Oropharynx is clear and moist. No oropharyngeal exudate.   Eyes: Conjunctivae and EOM are normal. Pupils are equal, round, and reactive  to light. No scleral icterus.   Neck: Neck supple. No tracheal deviation present.   Normal range of motion.  Cardiovascular:  Normal rate, regular rhythm and intact distal pulses.           Pulmonary/Chest: Breath sounds normal. No stridor. No respiratory distress. She has no wheezes. She has no rhonchi. She has no rales. She exhibits no tenderness.   Abdominal: Abdomen is soft. Bowel sounds are normal. She exhibits no distension. There is no abdominal tenderness. There is no guarding.   Musculoskeletal:         General: No tenderness or edema. Normal range of motion.      Cervical back: Normal range of motion and neck supple.     Lymphadenopathy:     She has no cervical adenopathy.   Neurological: She is alert and oriented to person, place, and time. She has normal strength. GCS score is 15. GCS eye subscore is 4. GCS verbal subscore is 5. GCS motor subscore is 6.   Skin: Skin is warm and dry. Capillary refill takes less than 2 seconds.   Psychiatric: She has a normal mood and affect. Thought content normal.         Medical Screening Exam   See Full Note    ED Course   Procedures  Labs Reviewed - No data to display       Imaging Results    None          Medications   acetaminophen tablet 1,000 mg (1,000 mg Oral Given 3/3/25 1715)   ondansetron disintegrating tablet 4 mg (4 mg Oral Given 3/3/25 1951)     Medical Decision Making  69-year-old female presents to the emergency department to be evaluated for fever and generalized body aches that started yesterday.  She reports nausea with vomiting x 1 yesterday.  She has not eaten, but has had water to drink over the last 2 days.  Denies diarrhea or constipation.  Fever during triage treated with acetaminophen  Nausea during exam treated with Zofran.  Patient offered sprite after Zofran.  She is able to tolerate with no nausea or vomiting.  Diagnosis:  Viral syndrome    Risk  Prescription drug management.                                      Clinical Impression:   Final  diagnoses:  [B34.9] Viral syndrome (Primary)                   [1]   Social History  Tobacco Use    Smoking status: Never    Smokeless tobacco: Never   Substance Use Topics    Alcohol use: Never    Drug use: Never        Tam Weaver FNP  03/03/25 2029

## 2025-03-04 NOTE — DISCHARGE INSTRUCTIONS
Rotate Tylenol and ibuprofen for any fever or discomfort.  Zofran for nausea as prescribed and directed.  Follow up with your primary care provider in 2 days. Return to the emergency department for any increase in symptoms or for any other new or worrisome symptoms.

## 2025-03-07 ENCOUNTER — HOSPITAL ENCOUNTER (INPATIENT)
Facility: HOSPITAL | Age: 70
LOS: 5 days | Discharge: HOME | DRG: 660 | End: 2025-03-12
Attending: FAMILY MEDICINE | Admitting: INTERNAL MEDICINE
Payer: COMMERCIAL

## 2025-03-07 DIAGNOSIS — E87.6 HYPOKALEMIA: ICD-10-CM

## 2025-03-07 DIAGNOSIS — N39.0 ACUTE UTI: Primary | ICD-10-CM

## 2025-03-07 DIAGNOSIS — N11.1 OBSTRUCTIVE PYELONEPHRITIS: ICD-10-CM

## 2025-03-07 DIAGNOSIS — N17.9 AKI (ACUTE KIDNEY INJURY): ICD-10-CM

## 2025-03-07 DIAGNOSIS — N13.2 HYDRONEPHROSIS WITH URINARY OBSTRUCTION DUE TO URETERAL CALCULUS: ICD-10-CM

## 2025-03-07 DIAGNOSIS — R07.9 CHEST PAIN: ICD-10-CM

## 2025-03-07 DIAGNOSIS — R41.82 ALTERED MENTAL STATUS, UNSPECIFIED: ICD-10-CM

## 2025-03-07 DIAGNOSIS — N13.30 HYDRONEPHROSIS, LEFT: ICD-10-CM

## 2025-03-07 DIAGNOSIS — R89.9 ABNORMAL LABORATORY TEST: ICD-10-CM

## 2025-03-07 PROBLEM — I10 ESSENTIAL HYPERTENSION: Status: ACTIVE | Noted: 2025-03-07

## 2025-03-07 PROBLEM — E87.1 HYPONATREMIA: Status: ACTIVE | Noted: 2025-03-07

## 2025-03-07 PROBLEM — Z86.73 HISTORY OF CVA (CEREBROVASCULAR ACCIDENT): Status: ACTIVE | Noted: 2025-03-07

## 2025-03-07 PROBLEM — E03.9 HYPOTHYROIDISM: Status: ACTIVE | Noted: 2025-03-07

## 2025-03-07 LAB
ALBUMIN SERPL BCP-MCNC: 2.8 G/DL (ref 3.4–4.8)
ALBUMIN/GLOB SERPL: 0.5 {RATIO}
ALP SERPL-CCNC: 70 U/L (ref 40–150)
ALT SERPL W P-5'-P-CCNC: 37 U/L
ANION GAP SERPL CALCULATED.3IONS-SCNC: 19 MMOL/L (ref 7–16)
AST SERPL W P-5'-P-CCNC: 54 U/L (ref 5–34)
BACTERIA #/AREA URNS HPF: ABNORMAL /HPF
BASOPHILS # BLD AUTO: 0.05 K/UL (ref 0–0.2)
BASOPHILS # BLD AUTO: 0.06 K/UL (ref 0–0.2)
BASOPHILS NFR BLD AUTO: 0.3 % (ref 0–1)
BASOPHILS NFR BLD AUTO: 0.3 % (ref 0–1)
BILIRUB SERPL-MCNC: 0.5 MG/DL
BILIRUB UR QL STRIP: NEGATIVE
BUN SERPL-MCNC: 52 MG/DL (ref 10–20)
BUN/CREAT SERPL: 18 (ref 6–20)
CALCIUM SERPL-MCNC: 9.1 MG/DL (ref 8.4–10.2)
CHLORIDE SERPL-SCNC: 95 MMOL/L (ref 98–107)
CK SERPL-CCNC: 111 U/L (ref 29–168)
CLARITY UR: ABNORMAL
CO2 SERPL-SCNC: 21 MMOL/L (ref 23–31)
COLOR UR: YELLOW
CREAT SERPL-MCNC: 2.88 MG/DL (ref 0.55–1.02)
DIFFERENTIAL METHOD BLD: ABNORMAL
DIFFERENTIAL METHOD BLD: ABNORMAL
EGFR (NO RACE VARIABLE) (RUSH/TITUS): 17 ML/MIN/1.73M2
EOSINOPHIL # BLD AUTO: 0.01 K/UL (ref 0–0.5)
EOSINOPHIL # BLD AUTO: 0.04 K/UL (ref 0–0.5)
EOSINOPHIL NFR BLD AUTO: 0.1 % (ref 1–4)
EOSINOPHIL NFR BLD AUTO: 0.2 % (ref 1–4)
ERYTHROCYTE [DISTWIDTH] IN BLOOD BY AUTOMATED COUNT: 15.9 % (ref 11.5–14.5)
ERYTHROCYTE [DISTWIDTH] IN BLOOD BY AUTOMATED COUNT: 16.2 % (ref 11.5–14.5)
GLOBULIN SER-MCNC: 5.4 G/DL (ref 2–4)
GLUCOSE SERPL-MCNC: 130 MG/DL (ref 82–115)
GLUCOSE UR STRIP-MCNC: NORMAL MG/DL
HCT VFR BLD AUTO: 34 % (ref 38–47)
HCT VFR BLD AUTO: 35.9 % (ref 38–47)
HGB BLD-MCNC: 11.4 G/DL (ref 12–16)
HGB BLD-MCNC: 12.1 G/DL (ref 12–16)
IMM GRANULOCYTES # BLD AUTO: 0.17 K/UL (ref 0–0.04)
IMM GRANULOCYTES # BLD AUTO: 0.21 K/UL (ref 0–0.04)
IMM GRANULOCYTES NFR BLD: 1 % (ref 0–0.4)
IMM GRANULOCYTES NFR BLD: 1.1 % (ref 0–0.4)
INFLUENZA A MOLECULAR (OHS): NEGATIVE
INFLUENZA B MOLECULAR (OHS): NEGATIVE
KETONES UR STRIP-SCNC: NEGATIVE MG/DL
LACTATE SERPL-SCNC: 1.1 MMOL/L (ref 0.5–2.2)
LEUKOCYTE ESTERASE UR QL STRIP: ABNORMAL
LYMPHOCYTES # BLD AUTO: 1.5 K/UL (ref 1–4.8)
LYMPHOCYTES # BLD AUTO: 1.55 K/UL (ref 1–4.8)
LYMPHOCYTES NFR BLD AUTO: 7.6 % (ref 27–41)
LYMPHOCYTES NFR BLD AUTO: 9 % (ref 27–41)
MAGNESIUM SERPL-MCNC: 2.7 MG/DL (ref 1.6–2.6)
MCH RBC QN AUTO: 27 PG (ref 27–31)
MCH RBC QN AUTO: 27.1 PG (ref 27–31)
MCHC RBC AUTO-ENTMCNC: 33.5 G/DL (ref 32–36)
MCHC RBC AUTO-ENTMCNC: 33.7 G/DL (ref 32–36)
MCV RBC AUTO: 80.3 FL (ref 80–96)
MCV RBC AUTO: 80.6 FL (ref 80–96)
MONOCYTES # BLD AUTO: 2.03 K/UL (ref 0–0.8)
MONOCYTES # BLD AUTO: 2.41 K/UL (ref 0–0.8)
MONOCYTES NFR BLD AUTO: 11.8 % (ref 2–6)
MONOCYTES NFR BLD AUTO: 12.2 % (ref 2–6)
MPC BLD CALC-MCNC: 10.6 FL (ref 9.4–12.4)
MPC BLD CALC-MCNC: 10.9 FL (ref 9.4–12.4)
MUCOUS, UA: ABNORMAL /LPF
NEUTROPHILS # BLD AUTO: 13.39 K/UL (ref 1.8–7.7)
NEUTROPHILS # BLD AUTO: 15.48 K/UL (ref 1.8–7.7)
NEUTROPHILS NFR BLD AUTO: 77.8 % (ref 53–65)
NEUTROPHILS NFR BLD AUTO: 78.6 % (ref 53–65)
NITRITE UR QL STRIP: NEGATIVE
NRBC # BLD AUTO: 0 X10E3/UL
NRBC # BLD AUTO: 0 X10E3/UL
NRBC, AUTO (.00): 0 %
NRBC, AUTO (.00): 0 %
PH UR STRIP: 6.5 PH UNITS
PLATELET # BLD AUTO: 267 K/UL (ref 150–400)
PLATELET # BLD AUTO: 269 K/UL (ref 150–400)
POTASSIUM SERPL-SCNC: 3.5 MMOL/L (ref 3.5–5.1)
PROT SERPL-MCNC: 8.2 G/DL (ref 5.8–7.6)
PROT UR QL STRIP: 100
RBC # BLD AUTO: 4.22 M/UL (ref 4.2–5.4)
RBC # BLD AUTO: 4.47 M/UL (ref 4.2–5.4)
RBC # UR STRIP: ABNORMAL /UL
RBC #/AREA URNS HPF: 10 /HPF
SARS-COV-2 RDRP RESP QL NAA+PROBE: NEGATIVE
SODIUM SERPL-SCNC: 131 MMOL/L (ref 136–145)
SP GR UR STRIP: 1.01
UROBILINOGEN UR STRIP-ACNC: NORMAL MG/DL
WBC # BLD AUTO: 17.2 K/UL (ref 4.5–11)
WBC # BLD AUTO: 19.7 K/UL (ref 4.5–11)
WBC #/AREA URNS HPF: 95 /HPF
WBC CLUMPS, UA: ABNORMAL /HPF
YEAST #/AREA URNS HPF: ABNORMAL /HPF

## 2025-03-07 PROCEDURE — 96374 THER/PROPH/DIAG INJ IV PUSH: CPT

## 2025-03-07 PROCEDURE — 83735 ASSAY OF MAGNESIUM: CPT | Performed by: FAMILY MEDICINE

## 2025-03-07 PROCEDURE — 25000003 PHARM REV CODE 250: Performed by: INTERNAL MEDICINE

## 2025-03-07 PROCEDURE — 82550 ASSAY OF CK (CPK): CPT | Performed by: INTERNAL MEDICINE

## 2025-03-07 PROCEDURE — 87635 SARS-COV-2 COVID-19 AMP PRB: CPT | Performed by: FAMILY MEDICINE

## 2025-03-07 PROCEDURE — 63600175 PHARM REV CODE 636 W HCPCS: Performed by: FAMILY MEDICINE

## 2025-03-07 PROCEDURE — 85025 COMPLETE CBC W/AUTO DIFF WBC: CPT | Performed by: FAMILY MEDICINE

## 2025-03-07 PROCEDURE — 63600175 PHARM REV CODE 636 W HCPCS: Performed by: INTERNAL MEDICINE

## 2025-03-07 PROCEDURE — 11000001 HC ACUTE MED/SURG PRIVATE ROOM

## 2025-03-07 PROCEDURE — 25000003 PHARM REV CODE 250: Performed by: FAMILY MEDICINE

## 2025-03-07 PROCEDURE — 87502 INFLUENZA DNA AMP PROBE: CPT | Performed by: FAMILY MEDICINE

## 2025-03-07 PROCEDURE — 81001 URINALYSIS AUTO W/SCOPE: CPT | Performed by: FAMILY MEDICINE

## 2025-03-07 PROCEDURE — 83605 ASSAY OF LACTIC ACID: CPT | Performed by: FAMILY MEDICINE

## 2025-03-07 PROCEDURE — 99285 EMERGENCY DEPT VISIT HI MDM: CPT | Mod: 25

## 2025-03-07 PROCEDURE — 87077 CULTURE AEROBIC IDENTIFY: CPT | Performed by: FAMILY MEDICINE

## 2025-03-07 PROCEDURE — 80053 COMPREHEN METABOLIC PANEL: CPT | Performed by: EMERGENCY MEDICINE

## 2025-03-07 PROCEDURE — 36415 COLL VENOUS BLD VENIPUNCTURE: CPT | Performed by: INTERNAL MEDICINE

## 2025-03-07 PROCEDURE — 85025 COMPLETE CBC W/AUTO DIFF WBC: CPT | Performed by: EMERGENCY MEDICINE

## 2025-03-07 PROCEDURE — 87040 BLOOD CULTURE FOR BACTERIA: CPT | Performed by: FAMILY MEDICINE

## 2025-03-07 PROCEDURE — 36415 COLL VENOUS BLD VENIPUNCTURE: CPT | Performed by: FAMILY MEDICINE

## 2025-03-07 RX ORDER — CARVEDILOL 25 MG/1
25 TABLET ORAL 2 TIMES DAILY
COMMUNITY
Start: 2024-12-26

## 2025-03-07 RX ORDER — NALOXONE HCL 0.4 MG/ML
0.02 VIAL (ML) INJECTION
Status: DISCONTINUED | OUTPATIENT
Start: 2025-03-07 | End: 2025-03-12

## 2025-03-07 RX ORDER — CARVEDILOL 25 MG/1
25 TABLET ORAL 2 TIMES DAILY
Status: DISCONTINUED | OUTPATIENT
Start: 2025-03-07 | End: 2025-03-12 | Stop reason: HOSPADM

## 2025-03-07 RX ORDER — ASPIRIN 81 MG/1
81 TABLET ORAL DAILY
Status: DISCONTINUED | OUTPATIENT
Start: 2025-03-08 | End: 2025-03-12 | Stop reason: HOSPADM

## 2025-03-07 RX ORDER — ATORVASTATIN CALCIUM 40 MG/1
40 TABLET, FILM COATED ORAL DAILY
Status: DISCONTINUED | OUTPATIENT
Start: 2025-03-08 | End: 2025-03-12 | Stop reason: HOSPADM

## 2025-03-07 RX ORDER — SODIUM CHLORIDE 9 MG/ML
INJECTION, SOLUTION INTRAVENOUS CONTINUOUS
Status: DISPENSED | OUTPATIENT
Start: 2025-03-07 | End: 2025-03-09

## 2025-03-07 RX ORDER — ALUMINUM HYDROXIDE, MAGNESIUM HYDROXIDE, AND SIMETHICONE 1200; 120; 1200 MG/30ML; MG/30ML; MG/30ML
30 SUSPENSION ORAL 4 TIMES DAILY PRN
Status: DISCONTINUED | OUTPATIENT
Start: 2025-03-07 | End: 2025-03-12 | Stop reason: HOSPADM

## 2025-03-07 RX ORDER — CEFTRIAXONE 1 G/1
1 INJECTION, POWDER, FOR SOLUTION INTRAMUSCULAR; INTRAVENOUS
Status: DISCONTINUED | OUTPATIENT
Start: 2025-03-07 | End: 2025-03-12

## 2025-03-07 RX ORDER — HEPARIN SODIUM 5000 [USP'U]/ML
5000 INJECTION, SOLUTION INTRAVENOUS; SUBCUTANEOUS EVERY 8 HOURS
Status: DISCONTINUED | OUTPATIENT
Start: 2025-03-07 | End: 2025-03-12 | Stop reason: HOSPADM

## 2025-03-07 RX ORDER — LEVOTHYROXINE SODIUM 112 UG/1
112 TABLET ORAL
Status: DISCONTINUED | OUTPATIENT
Start: 2025-03-08 | End: 2025-03-12 | Stop reason: HOSPADM

## 2025-03-07 RX ORDER — ONDANSETRON HYDROCHLORIDE 2 MG/ML
4 INJECTION, SOLUTION INTRAVENOUS EVERY 8 HOURS PRN
Status: DISCONTINUED | OUTPATIENT
Start: 2025-03-07 | End: 2025-03-12

## 2025-03-07 RX ORDER — ACETAMINOPHEN 325 MG/1
650 TABLET ORAL EVERY 4 HOURS PRN
Status: DISCONTINUED | OUTPATIENT
Start: 2025-03-07 | End: 2025-03-12 | Stop reason: HOSPADM

## 2025-03-07 RX ORDER — IRBESARTAN 300 MG/1
300 TABLET ORAL DAILY
Status: ON HOLD | COMMUNITY
Start: 2025-01-18 | End: 2025-03-12 | Stop reason: HOSPADM

## 2025-03-07 RX ORDER — SODIUM CHLORIDE 0.9 % (FLUSH) 0.9 %
10 SYRINGE (ML) INJECTION EVERY 12 HOURS PRN
Status: DISCONTINUED | OUTPATIENT
Start: 2025-03-07 | End: 2025-03-12 | Stop reason: HOSPADM

## 2025-03-07 RX ADMIN — PIPERACILLIN AND TAZOBACTAM 4.5 G: 4; .5 INJECTION, POWDER, LYOPHILIZED, FOR SOLUTION INTRAVENOUS; PARENTERAL at 02:03

## 2025-03-07 RX ADMIN — SODIUM CHLORIDE 1000 ML: 9 INJECTION, SOLUTION INTRAVENOUS at 02:03

## 2025-03-07 RX ADMIN — CARVEDILOL 25 MG: 25 TABLET, FILM COATED ORAL at 08:03

## 2025-03-07 RX ADMIN — HEPARIN SODIUM 5000 UNITS: 5000 INJECTION, SOLUTION INTRAVENOUS; SUBCUTANEOUS at 08:03

## 2025-03-07 RX ADMIN — SODIUM CHLORIDE: 9 INJECTION, SOLUTION INTRAVENOUS at 04:03

## 2025-03-07 RX ADMIN — CEFTRIAXONE SODIUM 1 G: 1 INJECTION, POWDER, FOR SOLUTION INTRAMUSCULAR; INTRAVENOUS at 04:03
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
## 2025-03-07 NOTE — HPI
Ms. Neil is a 69 Y O female with PMH of HTN, CVA, hypothyroidism who presented with the chief complaint of nausea, generalized weakness and lethargy. Patient reported she was in her usual state of health until 5 days ago when she noted feeling generally weak with lack of energy. This resulted in her having very poor PO intake and later she started to notice nausea. 2 days ago she had increased in frequency of urination with foul smell. She denies fever, chills, vomiting, diarrhea. She complains of vague left sided abdominal pain which is intermittent. No chest pain or shortness of breath reported. She has a dry cough. No ill contacts.

## 2025-03-07 NOTE — H&P
[facility] - Emergency Department  Hospital Medicine  History & Physical    Patient Name: Isaiah Neil  MRN: [id number]  Patient Class: IP- Inpatient  Admission Date: 3/7/2025  Attending Physician: Angus Wheatley MD   Primary Care Provider: Chrissy Powell DO (Inactive)         Patient information was obtained from patient, past medical records, and ER records.     Subjective:     Principal Problem:MINOR (acute kidney injury)    Chief Complaint:   Chief Complaint   Patient presents with    Abnormal Lab     PT POV TO ED - PT STATED SHE WENT TO PRIMARY PROVIDER TODAY AND CREATNINE WAS ELEVATED        HPI: Ms. Neil is a 69 Y O female with PMH of HTN, CVA, hypothyroidism who presented with the chief complaint of nausea, generalized weakness and lethargy. Patient reported she was in her usual state of health until 5 days ago when she noted feeling generally weak with lack of energy. This resulted in her having very poor PO intake and later she started to notice nausea. 2 days ago she had increased in frequency of urination with foul smell. She denies fever, chills, vomiting, diarrhea. She complains of vague left sided abdominal pain which is intermittent. No chest pain or shortness of breath reported. She has a dry cough. No ill contacts.     Past Medical History:   Diagnosis Date    Digestive disorder     Hypertension     Mixed hyperlipidemia     Stroke     Thyroid disease        Past Surgical History:   Procedure Laterality Date     SECTION      HYSTERECTOMY      SHOULDER ARTHROSCOPY Right 5/3/2022    Procedure: ARTHROSCOPY, SHOULDER;  Surgeon: Davis Dunn MD;  Location: [facility];  Service: Orthopedics;  Laterality: Right;       Review of patient's allergies indicates:   Allergen Reactions    Azithromycin Nausea Only    Codeine phosphate Nausea Only    Lortab [hydrocodone-acetaminophen] Nausea Only    Sulfa (sulfonamide antibiotics) Nausea Only       No current  facility-administered medications on file prior to encounter.     Current Outpatient Medications on File Prior to Encounter   Medication Sig    aspirin 325 MG tablet 1 tablet    atorvastatin (LIPITOR) 40 MG tablet     EUTHYROX 112 mcg tablet     metoprolol succinate (TOPROL-XL) 50 MG 24 hr tablet     NIFEdipine (PROCARDIA-XL) 30 MG (OSM) 24 hr tablet Take 30 mg by mouth once daily.    ondansetron (ZOFRAN) 4 MG tablet Take 1 tablet (4 mg total) by mouth every 6 (six) hours.     Family History       Problem Relation (Age of Onset)    Breast cancer Maternal Aunt          Tobacco Use    Smoking status: Never    Smokeless tobacco: Never   Substance and Sexual Activity    Alcohol use: Never    Drug use: Never    Sexual activity: Not on file     Review of Systems   Constitutional:  Positive for fatigue.   Neurological:  Positive for weakness.   All other systems reviewed and are negative.    Objective:     Vital Signs (Most Recent):  Temp: 99.3 °F (37.4 °C) (03/07/25 1341)  Pulse: 69 (03/07/25 1341)  Resp: 18 (03/07/25 0740)  BP: (!) 141/68 (03/07/25 1341)  SpO2: 95 % (03/07/25 1341) Vital Signs (24h Range):  Temp:  [98 °F (36.7 °C)-99.3 °F (37.4 °C)] 99.3 °F (37.4 °C)  Pulse:  [60-69] 69  Resp:  [18] 18  SpO2:  [94 %-96 %] 95 %  BP: (130-159)/(64-78) 141/68     Weight: 91.6 kg (202 lb)  Body mass index is 38.17 kg/m².     Physical Exam  Vitals and nursing note reviewed.   Constitutional:       Appearance: Normal appearance.   HENT:      Head: Normocephalic and atraumatic.      Mouth/Throat:      Mouth: Mucous membranes are dry.   Eyes:      Extraocular Movements: Extraocular movements intact.      Pupils: Pupils are equal, round, and reactive to light.   Cardiovascular:      Rate and Rhythm: Normal rate and regular rhythm.   Pulmonary:      Effort: Pulmonary effort is normal.      Breath sounds: Normal breath sounds.   Abdominal:      General: Bowel sounds are normal.      Palpations: Abdomen is soft.      Tenderness:  There is abdominal tenderness.   Musculoskeletal:         General: Normal range of motion.      Cervical back: Normal range of motion and neck supple.   Neurological:      General: No focal deficit present.      Mental Status: She is alert and oriented to person, place, and time. Mental status is at baseline.   Psychiatric:         Mood and Affect: Mood normal.         Behavior: Behavior normal.              CRANIAL NERVES     CN III, IV, VI   Pupils are equal, round, and reactive to light.       Significant Labs: All pertinent labs within the past 24 hours have been reviewed.    Significant Imaging: I have reviewed all pertinent imaging results/findings within the past 24 hours.  Assessment/Plan:     Assessment & Plan  MINOR (acute kidney injury)  MINOR is likely due to pre-renal azotemia due to dehydration.   Baseline creatinine is  1.3 .   Most recent creatinine and eGFR are listed below.  Recent Labs     03/07/25  0533   CREATININE 2.88*   EGFRNORACEVR 17*      Plan  - MINOR is worsening. Will continue current treatment  - Check renal ultrasound.   - Avoid nephrotoxins and renally dose meds for GFR listed above  - Monitor urine output, serial BMP, and adjust therapy as needed  - Started on IV fluids.   Acute UTI  UA suggestive of infection.  Empiric ceftriaxone started.   Follow urine culture.     Hyponatremia  Hyponatremia is likely due to Dehydration/hypovolemia. The patient's most recent sodium results are listed below.  Recent Labs     03/07/25  0533   *     Plan  - Correct the sodium by 4-6mEq in 24 hours.   - Obtain the following studies: none.  - Will treat the hyponatremia with IV fluids as follows: NaCl at 100cc/hr.  - Monitor sodium Daily.   - Patient hyponatremia is stable  Essential hypertension  Patient's blood pressure range in the last 24 hours was: BP  Min: 130/78  Max: 159/64.The patient's inpatient anti-hypertensive regimen is listed below:  Current Antihypertensives  , 2 times daily, Oral  ,  Daily, Oral  carvediloL tablet 25 mg, 2 times daily, Oral    Plan  - BP is controlled, no changes needed to their regimen  - Hold home ARB due to MINOR.   Hypothyroidism  Resume home levothyroxine.     History of CVA (cerebrovascular accident)  No residual deficits.  Resume home aspirin, statin.     VTE Risk Mitigation (From admission, onward)           Ordered     heparin (porcine) injection 5,000 Units  Every 8 hours         03/07/25 1432     IP VTE HIGH RISK PATIENT  Once         03/07/25 1432     Place sequential compression device  Until discontinued         03/07/25 1432                                    JOSE BARNEY MD  Department of Hospital Medicine  [facility] - Emergency Department

## 2025-03-07 NOTE — PHARMACY MED REC
"Admission Medication History     The home medication history was taken by Sheila Mendenhall.    You may go to "Admission" then "Reconcile Home Medications" tabs to review and/or act upon these items.     The home medication list has been updated by the Pharmacy department.   Please read ALL comments highlighted in yellow.   Please address this information as you see fit.    Feel free to contact us if you have any questions or require assistance.  Medications Updated:  Aspirin 325 mg updated to Aspirin 81 mg  Medications Added:  Carvedilol 25 mg  Irbesartan 300 mg      Patient reports no longer taking the following medication(s). The medication(s) listed below were removed from the home medication list. Please reorder if appropriate:  Metoprolol Succinate 50 mg  Nifedipine XL 30 mg  Ondansetron 4 mg    Medications listed below were obtained from: Patient/family and Analytic software- [employer]  (Not in a hospital admission)        Current Outpatient Medications on File Prior to Encounter   Medication Sig Dispense Refill Last Dose/Taking    aspirin (ECOTRIN) 81 MG EC tablet Take 81 mg by mouth once daily.   3/3/2025    atorvastatin (LIPITOR) 40 MG tablet Take 40 mg by mouth once daily.   3/3/2025    carvediloL (COREG) 25 MG tablet Take 25 mg by mouth 2 (two) times daily.   3/3/2025    EUTHYROX 112 mcg tablet Take 112 mcg by mouth before breakfast.   3/3/2025    irbesartan (AVAPRO) 300 MG tablet Take 300 mg by mouth once daily.   3/3/2025    [DISCONTINUED] metoprolol succinate (TOPROL-XL) 50 MG 24 hr tablet        [DISCONTINUED] NIFEdipine (PROCARDIA-XL) 30 MG (OSM) 24 hr tablet Take 30 mg by mouth once daily.       [DISCONTINUED] ondansetron (ZOFRAN) 4 MG tablet Take 1 tablet (4 mg total) by mouth every 6 (six) hours. 12 tablet 0          Potential issues to be addressed PRIOR TO DISCHARGE  Please discuss with the patient barriers to adherence with medication treatment plans    Sheila Mendenhall  Medication Access " Specialist  [address]. [phone]    .

## 2025-03-07 NOTE — ASSESSMENT & PLAN NOTE
MINOR is likely due to pre-renal azotemia due to dehydration.   Baseline creatinine is 1.3.   Most recent creatinine and eGFR are listed below.  Recent Labs     03/07/25  0533   CREATININE 2.88*   EGFRNORACEVR 17*      Plan  - MINOR is worsening. Will continue current treatment  - Check renal ultrasound.   - Avoid nephrotoxins and renally dose meds for GFR listed above  - Monitor urine output, serial BMP, and adjust therapy as needed  - Started on IV fluids.

## 2025-03-07 NOTE — ASSESSMENT & PLAN NOTE
Patient's blood pressure range in the last 24 hours was: BP  Min: 130/78  Max: 159/64.The patient's inpatient anti-hypertensive regimen is listed below:  Current Antihypertensives  , 2 times daily, Oral  , Daily, Oral  carvediloL tablet 25 mg, 2 times daily, Oral    Plan  - BP is controlled, no changes needed to their regimen  - Hold home ARB due to MINOR.

## 2025-03-07 NOTE — ASSESSMENT & PLAN NOTE
Hyponatremia is likely due to Dehydration/hypovolemia. The patient's most recent sodium results are listed below.  Recent Labs     03/07/25  0533   *     Plan  - Correct the sodium by 4-6mEq in 24 hours.   - Obtain the following studies: none.  - Will treat the hyponatremia with IV fluids as follows: NaCl at 100cc/hr.  - Monitor sodium Daily.   - Patient hyponatremia is stable

## 2025-03-07 NOTE — SUBJECTIVE & OBJECTIVE
Past Medical History:   Diagnosis Date    Digestive disorder     Hypertension     Mixed hyperlipidemia     Stroke     Thyroid disease        Past Surgical History:   Procedure Laterality Date     SECTION      HYSTERECTOMY      SHOULDER ARTHROSCOPY Right 5/3/2022    Procedure: ARTHROSCOPY, SHOULDER;  Surgeon: Davis Dunn MD;  Location: [facility];  Service: Orthopedics;  Laterality: Right;       Review of patient's allergies indicates:   Allergen Reactions    Azithromycin Nausea Only    Codeine phosphate Nausea Only    Lortab [hydrocodone-acetaminophen] Nausea Only    Sulfa (sulfonamide antibiotics) Nausea Only       No current facility-administered medications on file prior to encounter.     Current Outpatient Medications on File Prior to Encounter   Medication Sig    aspirin 325 MG tablet 1 tablet    atorvastatin (LIPITOR) 40 MG tablet     EUTHYROX 112 mcg tablet     metoprolol succinate (TOPROL-XL) 50 MG 24 hr tablet     NIFEdipine (PROCARDIA-XL) 30 MG (OSM) 24 hr tablet Take 30 mg by mouth once daily.    ondansetron (ZOFRAN) 4 MG tablet Take 1 tablet (4 mg total) by mouth every 6 (six) hours.     Family History       Problem Relation (Age of Onset)    Breast cancer Maternal Aunt          Tobacco Use    Smoking status: Never    Smokeless tobacco: Never   Substance and Sexual Activity    Alcohol use: Never    Drug use: Never    Sexual activity: Not on file     Review of Systems   Constitutional:  Positive for fatigue.   Neurological:  Positive for weakness.   All other systems reviewed and are negative.    Objective:     Vital Signs (Most Recent):  Temp: 99.3 °F (37.4 °C) (25 1341)  Pulse: 69 (25 1341)  Resp: 18 (25 0740)  BP: (!) 141/68 (25 1341)  SpO2: 95 % (25 1341) Vital Signs (24h Range):  Temp:  [98 °F (36.7 °C)-99.3 °F (37.4 °C)] 99.3 °F (37.4 °C)  Pulse:  [60-69] 69  Resp:  [18] 18  SpO2:  [94 %-96 %] 95 %  BP: (130-159)/(64-78) 141/68     Weight: 91.6  kg (202 lb)  Body mass index is 38.17 kg/m².     Physical Exam  Vitals and nursing note reviewed.   Constitutional:       Appearance: Normal appearance.   HENT:      Head: Normocephalic and atraumatic.      Mouth/Throat:      Mouth: Mucous membranes are dry.   Eyes:      Extraocular Movements: Extraocular movements intact.      Pupils: Pupils are equal, round, and reactive to light.   Cardiovascular:      Rate and Rhythm: Normal rate and regular rhythm.   Pulmonary:      Effort: Pulmonary effort is normal.      Breath sounds: Normal breath sounds.   Abdominal:      General: Bowel sounds are normal.      Palpations: Abdomen is soft.      Tenderness: There is abdominal tenderness.   Musculoskeletal:         General: Normal range of motion.      Cervical back: Normal range of motion and neck supple.   Neurological:      General: No focal deficit present.      Mental Status: She is alert and oriented to person, place, and time. Mental status is at baseline.   Psychiatric:         Mood and Affect: Mood normal.         Behavior: Behavior normal.              CRANIAL NERVES     CN III, IV, VI   Pupils are equal, round, and reactive to light.       Significant Labs: All pertinent labs within the past 24 hours have been reviewed.    Significant Imaging: I have reviewed all pertinent imaging results/findings within the past 24 hours.

## 2025-03-08 PROBLEM — N11.1 OBSTRUCTIVE PYELONEPHRITIS: Status: ACTIVE | Noted: 2025-03-08

## 2025-03-08 PROBLEM — E87.6 HYPOKALEMIA: Status: ACTIVE | Noted: 2025-03-08

## 2025-03-08 PROBLEM — N13.30 HYDRONEPHROSIS, LEFT: Status: ACTIVE | Noted: 2025-03-08

## 2025-03-08 LAB
ANION GAP SERPL CALCULATED.3IONS-SCNC: 15 MMOL/L (ref 7–16)
BASOPHILS # BLD AUTO: 0.03 K/UL (ref 0–0.2)
BASOPHILS NFR BLD AUTO: 0.3 % (ref 0–1)
BUN SERPL-MCNC: 54 MG/DL (ref 10–20)
BUN/CREAT SERPL: 19 (ref 6–20)
CALCIUM SERPL-MCNC: 7.9 MG/DL (ref 8.4–10.2)
CHLORIDE SERPL-SCNC: 106 MMOL/L (ref 98–107)
CO2 SERPL-SCNC: 19 MMOL/L (ref 23–31)
CREAT SERPL-MCNC: 2.79 MG/DL (ref 0.55–1.02)
DIFFERENTIAL METHOD BLD: ABNORMAL
EGFR (NO RACE VARIABLE) (RUSH/TITUS): 18 ML/MIN/1.73M2
EOSINOPHIL # BLD AUTO: 0.04 K/UL (ref 0–0.5)
EOSINOPHIL NFR BLD AUTO: 0.4 % (ref 1–4)
ERYTHROCYTE [DISTWIDTH] IN BLOOD BY AUTOMATED COUNT: 16.1 % (ref 11.5–14.5)
GLUCOSE SERPL-MCNC: 106 MG/DL (ref 82–115)
HCT VFR BLD AUTO: 32.5 % (ref 38–47)
HGB BLD-MCNC: 10.8 G/DL (ref 12–16)
IMM GRANULOCYTES # BLD AUTO: 0.19 K/UL (ref 0–0.04)
IMM GRANULOCYTES NFR BLD: 1.7 % (ref 0–0.4)
LYMPHOCYTES # BLD AUTO: 1.5 K/UL (ref 1–4.8)
LYMPHOCYTES NFR BLD AUTO: 13.5 % (ref 27–41)
MCH RBC QN AUTO: 26.9 PG (ref 27–31)
MCHC RBC AUTO-ENTMCNC: 33.2 G/DL (ref 32–36)
MCV RBC AUTO: 80.8 FL (ref 80–96)
MONOCYTES # BLD AUTO: 1.44 K/UL (ref 0–0.8)
MONOCYTES NFR BLD AUTO: 13 % (ref 2–6)
MPC BLD CALC-MCNC: 10.7 FL (ref 9.4–12.4)
NEUTROPHILS # BLD AUTO: 7.89 K/UL (ref 1.8–7.7)
NEUTROPHILS NFR BLD AUTO: 71.1 % (ref 53–65)
NRBC # BLD AUTO: 0 X10E3/UL
NRBC, AUTO (.00): 0 %
PLATELET # BLD AUTO: 247 K/UL (ref 150–400)
POTASSIUM SERPL-SCNC: 3.2 MMOL/L (ref 3.5–5.1)
RBC # BLD AUTO: 4.02 M/UL (ref 4.2–5.4)
SODIUM SERPL-SCNC: 137 MMOL/L (ref 136–145)
WBC # BLD AUTO: 11.09 K/UL (ref 4.5–11)

## 2025-03-08 PROCEDURE — 99223 1ST HOSP IP/OBS HIGH 75: CPT | Mod: ,,, | Performed by: UROLOGY

## 2025-03-08 PROCEDURE — 63600175 PHARM REV CODE 636 W HCPCS: Performed by: INTERNAL MEDICINE

## 2025-03-08 PROCEDURE — 11000001 HC ACUTE MED/SURG PRIVATE ROOM

## 2025-03-08 PROCEDURE — 51798 US URINE CAPACITY MEASURE: CPT

## 2025-03-08 PROCEDURE — 97165 OT EVAL LOW COMPLEX 30 MIN: CPT

## 2025-03-08 PROCEDURE — 80048 BASIC METABOLIC PNL TOTAL CA: CPT | Performed by: INTERNAL MEDICINE

## 2025-03-08 PROCEDURE — 85025 COMPLETE CBC W/AUTO DIFF WBC: CPT | Performed by: INTERNAL MEDICINE

## 2025-03-08 PROCEDURE — 36415 COLL VENOUS BLD VENIPUNCTURE: CPT | Performed by: INTERNAL MEDICINE

## 2025-03-08 PROCEDURE — 97161 PT EVAL LOW COMPLEX 20 MIN: CPT

## 2025-03-08 PROCEDURE — 25000003 PHARM REV CODE 250: Performed by: INTERNAL MEDICINE

## 2025-03-08 RX ORDER — OXYCODONE AND ACETAMINOPHEN 5; 325 MG/1; MG/1
1 TABLET ORAL EVERY 6 HOURS PRN
Status: DISCONTINUED | OUTPATIENT
Start: 2025-03-08 | End: 2025-03-12

## 2025-03-08 RX ADMIN — ACETAMINOPHEN 650 MG: 325 TABLET ORAL at 12:03

## 2025-03-08 RX ADMIN — ATORVASTATIN CALCIUM 40 MG: 40 TABLET, FILM COATED ORAL at 09:03

## 2025-03-08 RX ADMIN — POTASSIUM BICARBONATE 25 MEQ: 977.5 TABLET, EFFERVESCENT ORAL at 09:03

## 2025-03-08 RX ADMIN — CARVEDILOL 25 MG: 25 TABLET, FILM COATED ORAL at 09:03

## 2025-03-08 RX ADMIN — CEFTRIAXONE SODIUM 1 G: 1 INJECTION, POWDER, FOR SOLUTION INTRAMUSCULAR; INTRAVENOUS at 02:03

## 2025-03-08 RX ADMIN — HEPARIN SODIUM 5000 UNITS: 5000 INJECTION, SOLUTION INTRAVENOUS; SUBCUTANEOUS at 05:03

## 2025-03-08 RX ADMIN — HEPARIN SODIUM 5000 UNITS: 5000 INJECTION, SOLUTION INTRAVENOUS; SUBCUTANEOUS at 02:03

## 2025-03-08 RX ADMIN — CARVEDILOL 25 MG: 25 TABLET, FILM COATED ORAL at 08:03

## 2025-03-08 RX ADMIN — SODIUM CHLORIDE: 9 INJECTION, SOLUTION INTRAVENOUS at 03:03

## 2025-03-08 RX ADMIN — LEVOTHYROXINE SODIUM 112 MCG: 0.11 TABLET ORAL at 05:03

## 2025-03-08 RX ADMIN — ONDANSETRON 4 MG: 2 INJECTION INTRAMUSCULAR; INTRAVENOUS at 08:03

## 2025-03-08 RX ADMIN — HEPARIN SODIUM 5000 UNITS: 5000 INJECTION, SOLUTION INTRAVENOUS; SUBCUTANEOUS at 08:03

## 2025-03-08 RX ADMIN — ASPIRIN 81 MG: 81 TABLET, COATED ORAL at 09:03

## 2025-03-08 RX ADMIN — ACETAMINOPHEN 650 MG: 325 TABLET ORAL at 08:03

## 2025-03-08 RX ADMIN — SODIUM CHLORIDE: 9 INJECTION, SOLUTION INTRAVENOUS at 02:03

## 2025-03-08 NOTE — PT/OT/SLP EVAL
Physical Therapy Evaluation and Discharge Note    Patient Name:  Isaiah Neil   MRN:  [id number]    Recommendations:     Discharge Recommendations: No Therapy Indicated  Discharge Equipment Recommendations: none   Barriers to discharge:  ongoing medical treatment    Assessment:     Isaiah Neil is a 69 y.o. female admitted with a medical diagnosis of MINOR (acute kidney injury). .  At this time, patient is functioning at their prior level of function and does not require further acute PT services.     Patient independent with mobility during evaluation. Does not need any equipment for home. Please re-consult if needed    Recent Surgery: * No surgery found *      Plan:     During this hospitalization, patient does not require further acute PT services.  Please re-consult if situation changes.      Subjective     Chief Complaint: l flank pain  Patient/Family Comments/goals: agreeable  Pain/Comfort:  Pain Rating 1: 5/10  Location - Side 1: Left  Location 1: flank  Pain Addressed 1: Pre-medicate for activity    Patients cultural, spiritual, Druze conflicts given the current situation:      Living Environment:  Home alone, 4 steps  Prior to admission, patients level of function was independent and working.  Equipment used at home: none.  DME owned (not currently used): none.  Upon discharge, patient will have assistance from none.    Objective:     Communicated with nurse prior to session.  Patient found HOB elevated with peripheral IV upon PT entry to room.    General Precautions: Standard,      Orthopedic Precautions:    Braces:    Respiratory Status: Room air    Exams:  RLE ROM: WNL  RLE Strength: WNL  LLE ROM: WNL  LLE Strength: WNL    Functional Mobility:  Bed Mobility:     Supine to Sit: stand by assistance  Sit to Supine: stand by assistance  Transfers:     Sit to Stand:  contact guard assistance with no AD  Gait: 30 ft with cga and no ad, safe mobility  Balance: good    AM-PAC 6 CLICK MOBILITY  Total  Score:24       Treatment and Education:  Mobility as noted    AM-PAC 6 CLICK MOBILITY  Total Score:24     Patient left HOB elevated with all lines intact, call button in reach, and nurse notified.    GOALS:   Multidisciplinary Problems       Physical Therapy Goals       Not on file                    DME Justifications:  none    History:     Past Medical History:   Diagnosis Date    Digestive disorder     Hypertension     Mixed hyperlipidemia     Stroke     Thyroid disease        Past Surgical History:   Procedure Laterality Date     SECTION      HYSTERECTOMY      SHOULDER ARTHROSCOPY Right 5/3/2022    Procedure: ARTHROSCOPY, SHOULDER;  Surgeon: Davis Dunn MD;  Location: [facility];  Service: Orthopedics;  Laterality: Right;       Time Tracking:     PT Received On: 25  PT Start Time: 940     PT Stop Time: 55  PT Total Time (min): 15 min     Billable Minutes: Evaluation 15      2025

## 2025-03-08 NOTE — HOSPITAL COURSE
69 year old female presents with nausea an weakness.  She went to her PCP and was found to have an elevated Cr level.  She was sent to the ER.  Renal U/S showed hydronephrosis.  She had a UTI and was placed on Rocephin.  Urology was consulted who placed a Duval.  CT scan showed a large stone and obstruction.  Urology placed a ureteral stent and performed lithotripsy. Urine culture showed Proteus and Citrobacter, and she was placed on Rocephin for her sepsis.  She continued to improve and is now feeling better.  She will go home to complete a 14 day course of Omnicef and will follow up with Urology in 3 weeks as an outpt.  Patient denies chest pain, shortness of breath, nausea, vomiting, or diarrhea and is stable for discharge.

## 2025-03-08 NOTE — ASSESSMENT & PLAN NOTE
MINOR is likely due to pre-renal azotemia due to dehydration.   Baseline creatinine is 1.3.   Most recent creatinine and eGFR are listed below.  Recent Labs     03/07/25  0533 03/08/25  0403   CREATININE 2.88* 2.79*   EGFRNORACEVR 17* 18*      Plan  - MINOR is worsening. Will continue current treatment  - Renal ultrasound with mild left hydronephrosis- checking CT renal.   - Avoid nephrotoxins and renally dose meds for GFR listed above  - Monitor urine output, serial BMP, and adjust therapy as needed  - Started on IV fluids.

## 2025-03-08 NOTE — PROGRESS NOTES
[facility] - [address] [place] [address] Medicine  Progress Note    Patient Name: Isaiah Neil  MRN: [id number]  Patient Class: IP- Inpatient   Admission Date: 3/7/2025  Length of Stay: 1 days  Attending Physician: Angus Wheatley MD  Primary Care Provider: Chrissy Powell DO (Inactive)        Subjective     Principal Problem:MINOR (acute kidney injury)        HPI:  Ms. Neil is a 69 Y O female with PMH of HTN, CVA, hypothyroidism who presented with the chief complaint of nausea, generalized weakness and lethargy. Patient reported she was in her usual state of health until 5 days ago when she noted feeling generally weak with lack of energy. This resulted in her having very poor PO intake and later she started to notice nausea. 2 days ago she had increased in frequency of urination with foul smell. She denies fever, chills, vomiting, diarrhea. She complains of vague left sided abdominal pain which is intermittent. No chest pain or shortness of breath reported. She has a dry cough. No ill contacts.     Overview/Hospital Course:  3/8- Renal ultrasound showed left hydronephrosis, checking CT for further evaluation. Continue ceftriaxone, urine culture with GNB. Cr not changed much, plan to check bladder scan.     Interval History: Patient seen and examined at the bedside, reports doing a little better but still with abdominal pain and feeling weak.     Review of Systems   Constitutional:  Positive for fatigue.   Gastrointestinal:  Positive for abdominal pain and nausea.   All other systems reviewed and are negative.    Objective:     Vital Signs (Most Recent):  Temp: 98.7 °F (37.1 °C) (03/08/25 0703)  Pulse: 61 (03/08/25 0703)  Resp: 15 (03/08/25 0703)  BP: 137/60 (03/08/25 0703)  SpO2: (!) 94 % (03/08/25 0703) Vital Signs (24h Range):  Temp:  [98.5 °F (36.9 °C)-99.3 °F (37.4 °C)] 98.7 °F (37.1 °C)  Pulse:  [47-71] 61  Resp:  [15-18] 15  SpO2:  [93 %-97 %] 94 %  BP: (114-158)/(54-72) 137/60      Weight: 91.6 kg (202 lb)  Body mass index is 38.17 kg/m².    Intake/Output Summary (Last 24 hours) at 3/8/2025 1242  Last data filed at 3/8/2025 0931  Gross per 24 hour   Intake 1334.22 ml   Output --   Net 1334.22 ml         Physical Exam  Vitals and nursing note reviewed.   Constitutional:       Appearance: Normal appearance.   HENT:      Head: Normocephalic and atraumatic.      Mouth/Throat:      Mouth: Mucous membranes are dry.   Eyes:      Extraocular Movements: Extraocular movements intact.      Pupils: Pupils are equal, round, and reactive to light.   Cardiovascular:      Rate and Rhythm: Normal rate and regular rhythm.   Pulmonary:      Effort: Pulmonary effort is normal.      Breath sounds: Normal breath sounds.   Abdominal:      General: Bowel sounds are normal.      Palpations: Abdomen is soft.      Tenderness: There is abdominal tenderness.   Musculoskeletal:         General: Normal range of motion.      Cervical back: Normal range of motion and neck supple.   Neurological:      General: No focal deficit present.      Mental Status: She is alert and oriented to person, place, and time. Mental status is at baseline.   Psychiatric:         Mood and Affect: Affect is flat.               Significant Labs: All pertinent labs within the past 24 hours have been reviewed.    Significant Imaging: I have reviewed all pertinent imaging results/findings within the past 24 hours.      Assessment & Plan  MINOR (acute kidney injury)  MINOR is likely due to pre-renal azotemia due to dehydration.   Baseline creatinine is  1.3 .   Most recent creatinine and eGFR are listed below.  Recent Labs     03/07/25  0533 03/08/25  0403   CREATININE 2.88* 2.79*   EGFRNORACEVR 17* 18*      Plan  - MINOR is worsening. Will continue current treatment  - Renal ultrasound with mild left hydronephrosis- checking CT renal.   - Avoid nephrotoxins and renally dose meds for GFR listed above  - Monitor urine output, serial BMP, and adjust  therapy as needed  - Started on IV fluids.   Acute UTI  UA suggestive of infection.  Empiric ceftriaxone started.   Follow urine culture- GNB growth thus far.     Hyponatremia  Hyponatremia is likely due to Dehydration/hypovolemia. The patient's most recent sodium results are listed below.  Recent Labs     03/07/25  0533 03/08/25  0403   * 137     Plan  - Correct the sodium by 4-6mEq in 24 hours.   - Obtain the following studies: none.  - Will treat the hyponatremia with IV fluids as follows: NaCl at 100cc/hr.  - Monitor sodium Daily.   - Patient hyponatremia is resolved with fluids.  Essential hypertension  Patient's blood pressure range in the last 24 hours was: BP  Min: 114/54  Max: 158/72.The patient's inpatient anti-hypertensive regimen is listed below:  Current Antihypertensives  , 2 times daily, Oral  , Daily, Oral  carvediloL tablet 25 mg, 2 times daily, Oral    Plan  - BP is controlled, no changes needed to their regimen  - Hold home ARB due to MINOR.   Hypothyroidism  Resume home levothyroxine.     History of CVA (cerebrovascular accident)  No residual deficits.  Resume home aspirin, statin.     Hypokalemia  Patient's most recent potassium results are listed below.   Recent Labs     03/07/25  0533 03/08/25  0403   K 3.5 3.2*     Plan  - Replete potassium per protocol  - Monitor potassium Daily  - Patient's hypokalemia is stable  Hydronephrosis, left  Noted on renal ultrasound, patient also has tenderness on palpation of left upper quadrant.  Checking CT renal stone study for further evaluation.  Check bladder scan for urinary retention.     VTE Risk Mitigation (From admission, onward)           Ordered     heparin (porcine) injection 5,000 Units  Every 8 hours         03/07/25 1432     IP VTE HIGH RISK PATIENT  Once         03/07/25 1432     Place sequential compression device  Until discontinued         03/07/25 1432                    Discharge Planning   ALEXANDRE: 3/9/2025     Code Status: Full Code    Medical Readiness for Discharge Date:                    Please place Justification for DME        JOSE BARNEY MD  Department of Hospital Medicine   [facility] - [address] [place] [address]

## 2025-03-08 NOTE — SUBJECTIVE & OBJECTIVE
Interval History: Patient seen and examined at the bedside, reports doing a little better but still with abdominal pain and feeling weak.     Review of Systems   Constitutional:  Positive for fatigue.   Gastrointestinal:  Positive for abdominal pain and nausea.   All other systems reviewed and are negative.    Objective:     Vital Signs (Most Recent):  Temp: 98.7 °F (37.1 °C) (03/08/25 0703)  Pulse: 61 (03/08/25 0703)  Resp: 15 (03/08/25 0703)  BP: 137/60 (03/08/25 0703)  SpO2: (!) 94 % (03/08/25 0703) Vital Signs (24h Range):  Temp:  [98.5 °F (36.9 °C)-99.3 °F (37.4 °C)] 98.7 °F (37.1 °C)  Pulse:  [47-71] 61  Resp:  [15-18] 15  SpO2:  [93 %-97 %] 94 %  BP: (114-158)/(54-72) 137/60     Weight: 91.6 kg (202 lb)  Body mass index is 38.17 kg/m².    Intake/Output Summary (Last 24 hours) at 3/8/2025 1242  Last data filed at 3/8/2025 0931  Gross per 24 hour   Intake 1334.22 ml   Output --   Net 1334.22 ml         Physical Exam  Vitals and nursing note reviewed.   Constitutional:       Appearance: Normal appearance.   HENT:      Head: Normocephalic and atraumatic.      Mouth/Throat:      Mouth: Mucous membranes are dry.   Eyes:      Extraocular Movements: Extraocular movements intact.      Pupils: Pupils are equal, round, and reactive to light.   Cardiovascular:      Rate and Rhythm: Normal rate and regular rhythm.   Pulmonary:      Effort: Pulmonary effort is normal.      Breath sounds: Normal breath sounds.   Abdominal:      General: Bowel sounds are normal.      Palpations: Abdomen is soft.      Tenderness: There is abdominal tenderness.   Musculoskeletal:         General: Normal range of motion.      Cervical back: Normal range of motion and neck supple.   Neurological:      General: No focal deficit present.      Mental Status: She is alert and oriented to person, place, and time. Mental status is at baseline.   Psychiatric:         Mood and Affect: Affect is flat.               Significant Labs: All pertinent labs  within the past 24 hours have been reviewed.    Significant Imaging: I have reviewed all pertinent imaging results/findings within the past 24 hours.

## 2025-03-08 NOTE — ASSESSMENT & PLAN NOTE
Hyponatremia is likely due to Dehydration/hypovolemia. The patient's most recent sodium results are listed below.  Recent Labs     03/07/25  0533 03/08/25  0403   * 137     Plan  - Correct the sodium by 4-6mEq in 24 hours.   - Obtain the following studies: none.  - Will treat the hyponatremia with IV fluids as follows: NaCl at 100cc/hr.  - Monitor sodium Daily.   - Patient hyponatremia is resolved with fluids.

## 2025-03-08 NOTE — PT/OT/SLP EVAL
"Occupational Therapy   Evaluation and Discharge Note    Name: Isaiah Neil  MRN: [id number]  Admitting Diagnosis: MINOR (acute kidney injury)  Recent Surgery: * No surgery found *      Recommendations:     Discharge Recommendations: No Therapy Indicated  Discharge Equipment Recommendations: none  Barriers to discharge:   (on going medical treatment)    Assessment:     Isaiah Neil is a 69 y.o. female with a medical diagnosis of MINOR (acute kidney injury). At this time, patient is functioning at their prior level of function and does not require further acute OT services.     Plan:     During this hospitalization, patient does not require further acute OT services.  Please re-consult if situation changes.    Plan of Care Reviewed with: patient    Subjective     Chief Complaint: MINOR (acute kidney injury)    Patient/Family Comments/goals: "I can do everything, I am just a little weak."    Occupational Profile:  Living Environment: Pt lives alone in a single level home with 4 steps to enter, no rail.  Previous level of function: Pt was indepednent with all ADL/ IADL and mobility  Roles and Routines: Pt is employed full time at a dialysis center. Pt takes care of herself and her home  Equipment Used at home: none  Assistance upon Discharge: unknown    Pain/Comfort:  Pain Rating 1: 5/10  Location - Side 1: Left  Location 1: flank  Pain Addressed 1: Pre-medicate for activity, Cessation of Activity  Pain Rating Post-Intervention 1: 5/10    Patients cultural, spiritual, Sabianist conflicts given the current situation: no    Objective:     Communicated with: SERA Copeland prior to session.  Patient found HOB elevated with peripheral IV upon OT entry to room.    General Precautions: Standard,  (universal)  Orthopedic Precautions: N/A  Braces: N/A  Respiratory Status: Room air     Occupational Performance:    Bed Mobility:    Patient completed Scooting/Bridging with independence  Patient completed Supine to Sit with " independence  Patient completed Sit to Supine with independence    Functional Mobility/Transfers:  Patient completed Sit <> Stand Transfer with independence  with  no assistive device   Functional Mobility: Pt ambulated with independence with out a device. Pt did push her own IV pole, but was not using it for balance or support.    Activities of Daily Living:  Lower Body Dressing: independence    Pt was setup with supplies for bathing and brushing her teeth. Pt needed to rest before she attempted to perform her self-care.      Cognitive/Visual Perceptual:  Cognitive/Psychosocial Skills:     -       Oriented to: Person, Place, Time, and Situation   -       Follows Commands/attention:Follows two-step commands  -       Communication: clear/fluent  -       Safety awareness/insight to disability: intact   -       Mood/Affect/Coping skills/emotional control: Cooperative    Physical Exam:  Balance:    -       sitting with independence, standing with independence  Upper Extremity Range of Motion:     -       Right Upper Extremity: WFL  -       Left Upper Extremity: WFL  Upper Extremity Strength:    -       Right Upper Extremity: WFL  -       Left Upper Extremity: WFL   Strength:    -       Right Upper Extremity: WFL  -       Left Upper Extremity: WFL  Gross motor coordination:   WFL    AMPAC 6 Click ADL:  AMPAC Total Score: 23    Treatment & Education:  Pt educated on OT role  Importance of sitting up in the chair throughout the day as tolerated, especially for meals   Importance of assisting with self-care activities   All questions/concerns answered within OT scope of practice      Patient left HOB elevated with all lines intact, call button in reach, and RN notified    GOALS:   Multidisciplinary Problems       Occupational Therapy Goals       Not on file                      History:     Past Medical History:   Diagnosis Date    Digestive disorder     Hypertension     Mixed hyperlipidemia     Stroke     Thyroid  disease          Past Surgical History:   Procedure Laterality Date     SECTION      HYSTERECTOMY      SHOULDER ARTHROSCOPY Right 5/3/2022    Procedure: ARTHROSCOPY, SHOULDER;  Surgeon: Davis Dunn MD;  Location: [facility];  Service: Orthopedics;  Laterality: Right;       Time Tracking:     OT Date of Treatment: 25  OT Start Time: 946  OT Stop Time: 954  OT Total Time (min): 8 min    Billable Minutes:Evaluation low  complexity    3/8/2025

## 2025-03-08 NOTE — ASSESSMENT & PLAN NOTE
UA suggestive of infection.  Empiric ceftriaxone started.   Follow urine culture- GNB growth thus far.

## 2025-03-08 NOTE — ASSESSMENT & PLAN NOTE
Patient's most recent potassium results are listed below.   Recent Labs     03/07/25  0533 03/08/25  0403   K 3.5 3.2*     Plan  - Replete potassium per protocol  - Monitor potassium Daily  - Patient's hypokalemia is stable

## 2025-03-08 NOTE — NURSING
Rec'd pt to floor, no s/s of distress, denies pain. AAO x4.. Resp even and unlabored. Family member present at bedside. Skin assessment performed, see flowsheet. Resting in bed, reports poor appetite at this time. Safety measures in place. Will cont to monitor.

## 2025-03-08 NOTE — ASSESSMENT & PLAN NOTE
Patient's blood pressure range in the last 24 hours was: BP  Min: 114/54  Max: 158/72.The patient's inpatient anti-hypertensive regimen is listed below:  Current Antihypertensives  , 2 times daily, Oral  , Daily, Oral  carvediloL tablet 25 mg, 2 times daily, Oral    Plan  - BP is controlled, no changes needed to their regimen  - Hold home ARB due to MINOR.

## 2025-03-08 NOTE — ASSESSMENT & PLAN NOTE
Noted on renal ultrasound, patient also has tenderness on palpation of left upper quadrant.  Checking CT renal stone study for further evaluation.  Check bladder scan for urinary retention.

## 2025-03-09 ENCOUNTER — ANESTHESIA EVENT (OUTPATIENT)
Dept: SURGERY | Facility: HOSPITAL | Age: 70
End: 2025-03-09
Payer: MEDICARE

## 2025-03-09 ENCOUNTER — ANESTHESIA (OUTPATIENT)
Dept: SURGERY | Facility: HOSPITAL | Age: 70
End: 2025-03-09
Payer: COMMERCIAL

## 2025-03-09 PROBLEM — E87.6 HYPOKALEMIA: Status: RESOLVED | Noted: 2025-03-08 | Resolved: 2025-03-09

## 2025-03-09 PROBLEM — N13.2 HYDRONEPHROSIS WITH URINARY OBSTRUCTION DUE TO URETERAL CALCULUS: Status: ACTIVE | Noted: 2025-03-08

## 2025-03-09 PROBLEM — E87.1 HYPONATREMIA: Status: RESOLVED | Noted: 2025-03-07 | Resolved: 2025-03-09

## 2025-03-09 LAB
ANION GAP SERPL CALCULATED.3IONS-SCNC: 16 MMOL/L (ref 7–16)
ANISOCYTOSIS BLD QL SMEAR: ABNORMAL
BASOPHILS # BLD AUTO: 0.04 K/UL (ref 0–0.2)
BASOPHILS NFR BLD AUTO: 0.3 % (ref 0–1)
BUN SERPL-MCNC: 44 MG/DL (ref 10–20)
BUN/CREAT SERPL: 17 (ref 6–20)
CALCIUM SERPL-MCNC: 7.8 MG/DL (ref 8.4–10.2)
CHLORIDE SERPL-SCNC: 108 MMOL/L (ref 98–107)
CO2 SERPL-SCNC: 18 MMOL/L (ref 23–31)
CREAT SERPL-MCNC: 2.58 MG/DL (ref 0.55–1.02)
DIFFERENTIAL METHOD BLD: ABNORMAL
EGFR (NO RACE VARIABLE) (RUSH/TITUS): 20 ML/MIN/1.73M2
EOSINOPHIL # BLD AUTO: 0.03 K/UL (ref 0–0.5)
EOSINOPHIL NFR BLD AUTO: 0.2 % (ref 1–4)
ERYTHROCYTE [DISTWIDTH] IN BLOOD BY AUTOMATED COUNT: 16.1 % (ref 11.5–14.5)
GLUCOSE SERPL-MCNC: 103 MG/DL (ref 82–115)
HCT VFR BLD AUTO: 32.9 % (ref 38–47)
HGB BLD-MCNC: 10.9 G/DL (ref 12–16)
HYPOCHROMIA BLD QL SMEAR: ABNORMAL
IMM GRANULOCYTES # BLD AUTO: 0.41 K/UL (ref 0–0.04)
IMM GRANULOCYTES NFR BLD: 3.2 % (ref 0–0.4)
LYMPHOCYTES # BLD AUTO: 0.85 K/UL (ref 1–4.8)
LYMPHOCYTES NFR BLD AUTO: 6.7 % (ref 27–41)
LYMPHOCYTES NFR BLD MANUAL: 10 % (ref 27–41)
MCH RBC QN AUTO: 26.8 PG (ref 27–31)
MCHC RBC AUTO-ENTMCNC: 33.1 G/DL (ref 32–36)
MCV RBC AUTO: 81 FL (ref 80–96)
MONOCYTES # BLD AUTO: 1.02 K/UL (ref 0–0.8)
MONOCYTES NFR BLD AUTO: 8 % (ref 2–6)
MONOCYTES NFR BLD MANUAL: 5 % (ref 2–6)
MPC BLD CALC-MCNC: 11.6 FL (ref 9.4–12.4)
NEUTROPHILS # BLD AUTO: 10.38 K/UL (ref 1.8–7.7)
NEUTROPHILS NFR BLD AUTO: 81.6 % (ref 53–65)
NEUTS BAND NFR BLD MANUAL: 3 % (ref 1–5)
NEUTS SEG NFR BLD MANUAL: 82 % (ref 50–62)
NRBC # BLD AUTO: 0.04 X10E3/UL
NRBC, AUTO (.00): 0.3 %
PLATELET # BLD AUTO: 251 K/UL (ref 150–400)
PLATELET MORPHOLOGY: ABNORMAL
POTASSIUM SERPL-SCNC: 3.9 MMOL/L (ref 3.5–5.1)
RBC # BLD AUTO: 4.06 M/UL (ref 4.2–5.4)
SODIUM SERPL-SCNC: 138 MMOL/L (ref 136–145)
TARGETS BLD QL SMEAR: ABNORMAL
WBC # BLD AUTO: 12.73 K/UL (ref 4.5–11)

## 2025-03-09 PROCEDURE — 11000001 HC ACUTE MED/SURG PRIVATE ROOM

## 2025-03-09 PROCEDURE — 27201423 OPTIME MED/SURG SUP & DEVICES STERILE SUPPLY: Performed by: UROLOGY

## 2025-03-09 PROCEDURE — 25000003 PHARM REV CODE 250: Performed by: ANESTHESIOLOGY

## 2025-03-09 PROCEDURE — 85025 COMPLETE CBC W/AUTO DIFF WBC: CPT | Performed by: INTERNAL MEDICINE

## 2025-03-09 PROCEDURE — 74420 UROGRAPHY RTRGR +-KUB: CPT | Mod: 26,,, | Performed by: UROLOGY

## 2025-03-09 PROCEDURE — 52356 CYSTO/URETERO W/LITHOTRIPSY: CPT | Mod: LT,,, | Performed by: UROLOGY

## 2025-03-09 PROCEDURE — 63600175 PHARM REV CODE 636 W HCPCS: Performed by: INTERNAL MEDICINE

## 2025-03-09 PROCEDURE — 63600175 PHARM REV CODE 636 W HCPCS: Performed by: ANESTHESIOLOGY

## 2025-03-09 PROCEDURE — BT1F1ZZ FLUOROSCOPY OF LEFT KIDNEY, URETER AND BLADDER USING LOW OSMOLAR CONTRAST: ICD-10-PCS | Performed by: UROLOGY

## 2025-03-09 PROCEDURE — C1769 GUIDE WIRE: HCPCS | Performed by: UROLOGY

## 2025-03-09 PROCEDURE — 0T778DZ DILATION OF LEFT URETER WITH INTRALUMINAL DEVICE, VIA NATURAL OR ARTIFICIAL OPENING ENDOSCOPIC: ICD-10-PCS | Performed by: UROLOGY

## 2025-03-09 PROCEDURE — 0TF78ZZ FRAGMENTATION IN LEFT URETER, VIA NATURAL OR ARTIFICIAL OPENING ENDOSCOPIC: ICD-10-PCS | Performed by: UROLOGY

## 2025-03-09 PROCEDURE — 36415 COLL VENOUS BLD VENIPUNCTURE: CPT | Performed by: INTERNAL MEDICINE

## 2025-03-09 PROCEDURE — 25000242 PHARM REV CODE 250 ALT 637 W/ HCPCS: Performed by: ANESTHESIOLOGY

## 2025-03-09 PROCEDURE — C2617 STENT, NON-COR, TEM W/O DEL: HCPCS | Performed by: UROLOGY

## 2025-03-09 PROCEDURE — 80048 BASIC METABOLIC PNL TOTAL CA: CPT | Performed by: INTERNAL MEDICINE

## 2025-03-09 PROCEDURE — 36000706: Performed by: UROLOGY

## 2025-03-09 PROCEDURE — 71000033 HC RECOVERY, INTIAL HOUR: Performed by: UROLOGY

## 2025-03-09 PROCEDURE — 25000003 PHARM REV CODE 250: Performed by: INTERNAL MEDICINE

## 2025-03-09 PROCEDURE — 37000009 HC ANESTHESIA EA ADD 15 MINS: Performed by: UROLOGY

## 2025-03-09 PROCEDURE — D9220A PRA ANESTHESIA: Mod: ,,, | Performed by: ANESTHESIOLOGY

## 2025-03-09 PROCEDURE — 71000039 HC RECOVERY, EACH ADD'L HOUR: Performed by: UROLOGY

## 2025-03-09 PROCEDURE — 36000707: Performed by: UROLOGY

## 2025-03-09 PROCEDURE — 37000008 HC ANESTHESIA 1ST 15 MINUTES: Performed by: UROLOGY

## 2025-03-09 DEVICE — URETERAL STENT WITH SIDE HOLES 8FX24CM
Type: IMPLANTABLE DEVICE | Site: URETER | Status: FUNCTIONAL
Brand: TRIA™ FIRM

## 2025-03-09 RX ORDER — DEXAMETHASONE SODIUM PHOSPHATE 4 MG/ML
INJECTION, SOLUTION INTRA-ARTICULAR; INTRALESIONAL; INTRAMUSCULAR; INTRAVENOUS; SOFT TISSUE
Status: DISCONTINUED | OUTPATIENT
Start: 2025-03-09 | End: 2025-03-09

## 2025-03-09 RX ORDER — LIDOCAINE HYDROCHLORIDE 20 MG/ML
INJECTION, SOLUTION EPIDURAL; INFILTRATION; INTRACAUDAL; PERINEURAL
Status: DISCONTINUED | OUTPATIENT
Start: 2025-03-09 | End: 2025-03-09

## 2025-03-09 RX ORDER — HYDRALAZINE HYDROCHLORIDE 20 MG/ML
10 INJECTION INTRAMUSCULAR; INTRAVENOUS ONCE
Status: COMPLETED | OUTPATIENT
Start: 2025-03-09 | End: 2025-03-09

## 2025-03-09 RX ORDER — ONDANSETRON HYDROCHLORIDE 2 MG/ML
4 INJECTION, SOLUTION INTRAVENOUS DAILY PRN
Status: DISCONTINUED | OUTPATIENT
Start: 2025-03-09 | End: 2025-03-09 | Stop reason: HOSPADM

## 2025-03-09 RX ORDER — SODIUM CHLORIDE, SODIUM LACTATE, POTASSIUM CHLORIDE, CALCIUM CHLORIDE 600; 310; 30; 20 MG/100ML; MG/100ML; MG/100ML; MG/100ML
125 INJECTION, SOLUTION INTRAVENOUS CONTINUOUS
Status: DISCONTINUED | OUTPATIENT
Start: 2025-03-09 | End: 2025-03-12 | Stop reason: HOSPADM

## 2025-03-09 RX ORDER — ROCURONIUM BROMIDE 10 MG/ML
INJECTION, SOLUTION INTRAVENOUS
Status: DISCONTINUED | OUTPATIENT
Start: 2025-03-09 | End: 2025-03-09

## 2025-03-09 RX ORDER — DIPHENHYDRAMINE HYDROCHLORIDE 50 MG/ML
25 INJECTION, SOLUTION INTRAMUSCULAR; INTRAVENOUS EVERY 6 HOURS PRN
Status: DISCONTINUED | OUTPATIENT
Start: 2025-03-09 | End: 2025-03-09 | Stop reason: HOSPADM

## 2025-03-09 RX ORDER — MORPHINE SULFATE 10 MG/ML
4 INJECTION INTRAMUSCULAR; INTRAVENOUS; SUBCUTANEOUS EVERY 5 MIN PRN
Status: DISCONTINUED | OUTPATIENT
Start: 2025-03-09 | End: 2025-03-09 | Stop reason: HOSPADM

## 2025-03-09 RX ORDER — IPRATROPIUM BROMIDE AND ALBUTEROL SULFATE 2.5; .5 MG/3ML; MG/3ML
3 SOLUTION RESPIRATORY (INHALATION)
Status: COMPLETED | OUTPATIENT
Start: 2025-03-09 | End: 2025-03-09

## 2025-03-09 RX ORDER — PROPOFOL 10 MG/ML
VIAL (ML) INTRAVENOUS
Status: DISCONTINUED | OUTPATIENT
Start: 2025-03-09 | End: 2025-03-09

## 2025-03-09 RX ORDER — LABETALOL HYDROCHLORIDE 5 MG/ML
10 INJECTION, SOLUTION INTRAVENOUS ONCE
Status: COMPLETED | OUTPATIENT
Start: 2025-03-09 | End: 2025-03-09

## 2025-03-09 RX ORDER — LORAZEPAM 0.5 MG/1
0.5 TABLET ORAL EVERY 6 HOURS PRN
Status: DISCONTINUED | OUTPATIENT
Start: 2025-03-09 | End: 2025-03-12

## 2025-03-09 RX ORDER — FENTANYL CITRATE 50 UG/ML
INJECTION, SOLUTION INTRAMUSCULAR; INTRAVENOUS
Status: DISCONTINUED | OUTPATIENT
Start: 2025-03-09 | End: 2025-03-09

## 2025-03-09 RX ORDER — GLUCAGON 1 MG
1 KIT INJECTION
Status: DISCONTINUED | OUTPATIENT
Start: 2025-03-09 | End: 2025-03-09 | Stop reason: HOSPADM

## 2025-03-09 RX ADMIN — HEPARIN SODIUM 5000 UNITS: 5000 INJECTION, SOLUTION INTRAVENOUS; SUBCUTANEOUS at 06:03

## 2025-03-09 RX ADMIN — HYDRALAZINE HYDROCHLORIDE 10 MG: 20 INJECTION INTRAMUSCULAR; INTRAVENOUS at 05:03

## 2025-03-09 RX ADMIN — ONDANSETRON 4 MG: 2 INJECTION INTRAMUSCULAR; INTRAVENOUS at 01:03

## 2025-03-09 RX ADMIN — SUGAMMADEX 200 MG: 100 INJECTION, SOLUTION INTRAVENOUS at 02:03

## 2025-03-09 RX ADMIN — CARVEDILOL 25 MG: 25 TABLET, FILM COATED ORAL at 09:03

## 2025-03-09 RX ADMIN — LABETALOL HYDROCHLORIDE 10 MG: 5 INJECTION, SOLUTION INTRAVENOUS at 03:03

## 2025-03-09 RX ADMIN — OXYCODONE HYDROCHLORIDE AND ACETAMINOPHEN 1 TABLET: 5; 325 TABLET ORAL at 04:03

## 2025-03-09 RX ADMIN — CARVEDILOL 25 MG: 25 TABLET, FILM COATED ORAL at 08:03

## 2025-03-09 RX ADMIN — FENTANYL CITRATE 100 MCG: 50 INJECTION, SOLUTION INTRAMUSCULAR; INTRAVENOUS at 01:03

## 2025-03-09 RX ADMIN — DEXAMETHASONE SODIUM PHOSPHATE 8 MG: 4 INJECTION, SOLUTION INTRA-ARTICULAR; INTRALESIONAL; INTRAMUSCULAR; INTRAVENOUS; SOFT TISSUE at 01:03

## 2025-03-09 RX ADMIN — PROPOFOL 150 MG: 10 INJECTION, EMULSION INTRAVENOUS at 01:03

## 2025-03-09 RX ADMIN — ATORVASTATIN CALCIUM 40 MG: 40 TABLET, FILM COATED ORAL at 09:03

## 2025-03-09 RX ADMIN — CEFTRIAXONE SODIUM 2 G: 1 INJECTION, POWDER, FOR SOLUTION INTRAMUSCULAR; INTRAVENOUS at 01:03

## 2025-03-09 RX ADMIN — SODIUM CHLORIDE: 9 INJECTION, SOLUTION INTRAVENOUS at 01:03

## 2025-03-09 RX ADMIN — SODIUM CHLORIDE, POTASSIUM CHLORIDE, SODIUM LACTATE AND CALCIUM CHLORIDE 125 ML/HR: 600; 310; 30; 20 INJECTION, SOLUTION INTRAVENOUS at 06:03

## 2025-03-09 RX ADMIN — IPRATROPIUM BROMIDE AND ALBUTEROL SULFATE 3 ML: .5; 3 SOLUTION RESPIRATORY (INHALATION) at 02:03

## 2025-03-09 RX ADMIN — ASPIRIN 81 MG: 81 TABLET, COATED ORAL at 09:03

## 2025-03-09 RX ADMIN — ROCURONIUM BROMIDE 40 MG: 10 INJECTION, SOLUTION INTRAVENOUS at 01:03

## 2025-03-09 RX ADMIN — LEVOTHYROXINE SODIUM 112 MCG: 0.11 TABLET ORAL at 06:03

## 2025-03-09 RX ADMIN — ACETAMINOPHEN 650 MG: 325 TABLET ORAL at 09:03

## 2025-03-09 RX ADMIN — LIDOCAINE HYDROCHLORIDE 40 MG: 20 INJECTION, SOLUTION EPIDURAL; INFILTRATION; INTRACAUDAL; PERINEURAL at 01:03

## 2025-03-09 RX ADMIN — CEFTRIAXONE SODIUM 1 G: 1 INJECTION, POWDER, FOR SOLUTION INTRAMUSCULAR; INTRAVENOUS at 03:03

## 2025-03-09 NOTE — PLAN OF CARE
1436 pt to pacu pt resp reg and rapid pt sao2 89% on ra pt placed on o2 at 2l nbp pt sao2 up to 92% pt villanueva cath intact and draining blood tinged urine will monitor    1450  pt with some exp wheezing noted started duaneb breathing tx pt orders pt sao2 repain 92% on 2l nbp pt voices no c/o sob will monitor     1505  pt c/o being cold conchita huggar warming blanket applied      1510 b/p 190s systolic dr martinez notified gave trandate 10mg ivp will monitor     1535 pt b/p remains 190 systolic gave apresoline 10mg ivp will monitor     1550 pt vs stable pt sao2  98% on 2l nbp pt resp reg and rapid pt voices no complaints orders to release to room per md    1600 pt released to TORRES harringtno rn b/p 177/73 pulse 81 resp 36 sao2 94% on 2l nbp temp 100.3 oral

## 2025-03-09 NOTE — ASSESSMENT & PLAN NOTE
Vague left sided pain but with tenderness on palpation of left upper quadrant.  Renal US with left hydronephrosis.  CT renal stone showed mild to moderate left hydroureteronephrosis noting left perinephric and periureteral inflammation secondary to a large calculus within the left renal collecting system extending to the ureter and a separate slightly more distal proximal ureteral calculus.   Urology consulted; case discussed with Dr. Arellano and plan for ureteral stent placement with possible lithotripsy on 3/9.  Continue antibiotic, pain control.

## 2025-03-09 NOTE — ASSESSMENT & PLAN NOTE
Patient's most recent potassium results are listed below.   Recent Labs     03/07/25  0533 03/08/25  0403 03/09/25  0655   K 3.5 3.2* 3.9     Plan  - Replete potassium per protocol  - Monitor potassium Daily  - Patient's hypokalemia is stable

## 2025-03-09 NOTE — ASSESSMENT & PLAN NOTE
Hyponatremia is likely due to Dehydration/hypovolemia. The patient's most recent sodium results are listed below.  Recent Labs     03/07/25  0533 03/08/25  0403 03/09/25  0655   * 137 138     Plan  - Correct the sodium by 4-6mEq in 24 hours.   - Obtain the following studies: none.  - Will treat the hyponatremia with IV fluids as follows: NaCl at 100cc/hr.  - Monitor sodium Daily.   - Patient hyponatremia is resolved with fluids.

## 2025-03-09 NOTE — CONSULTS
[facility]  Urology  Consult Note    Patient Name: Isaiah Neil  MRN: [id number]  Admission Date: 3/7/2025  Hospital Length of Stay: 1   Code Status: Full Code   Attending Provider: Angus Wheatley MD   Consulting Provider: TL ARELLANO MD  Primary Care Physician: Chrissy Powell DO (Inactive)  Principal Problem:MINOR (acute kidney injury)    Inpatient consult to Urology  Consult performed by: Tl Arellano MD  Consult ordered by: Angus Wheatley MD  Reason for consult: Obstructive pyelonephritis  Assessment/Recommendations: I personally reviewed the CT scan of the abdomen and pelvis which demonstrated a large approximately 3 cm stone involving the proximal left ureter in addition to a 6 mm stone distally associated with secondary signs of obstruction and periureteral edema.  The patient has acute kidney injury and is currently on broad-spectrum antibiotics and was counseled on the need for intervention and I have requested an urgent add on for intervention where I am hopeful to perform left ureteral stent placement as a means to decompress the left collecting system with goals to eventually clear the ureter and kidney of all the stones.  The stone burden is significant and if I am not able to place a ureteral stent past the large stone she may require a percutaneous nephrostomy.  I am concerned that there is a possibility of difficult intervention and that ureteroscopy may be required in order to open the lumen of the ureter to place a ureteral stent and have requested for her team to continue antibiotics broad-spectrum with ceftriaxone in hopes to increase the urinary concentration of antibiotics in case complicated intervention is required.           Subjective:     HPI:  The patient is a 69-year-old female that presented to the emergency room with pyelonephritis and left flank pain associated with acute kidney injury and reports that she has had flank pain  and foul smelling urine for approximately a week.  She reports that the flank pain radiates to the flank and back and that she has associated nausea.  A comprehensive examination was performed demonstrating acute kidney injury and hydroureteronephrosis.  The patient was started on broad-spectrum antibiotics and Urology was consulted after an ultrasound identified hydronephrosis and CT scan confirmed a left ureteral stone.     Past Medical History:   Diagnosis Date    Digestive disorder     Hypertension     Mixed hyperlipidemia     Stroke     Thyroid disease        Past Surgical History:   Procedure Laterality Date     SECTION      HYSTERECTOMY      SHOULDER ARTHROSCOPY Right 5/3/2022    Procedure: ARTHROSCOPY, SHOULDER;  Surgeon: Davis Dunn MD;  Location: [facility];  Service: Orthopedics;  Laterality: Right;       Review of patient's allergies indicates:   Allergen Reactions    Azithromycin Nausea Only    Codeine phosphate Nausea Only    Lortab [hydrocodone-acetaminophen] Nausea Only    Sulfa (sulfonamide antibiotics) Nausea Only       Family History       Problem Relation (Age of Onset)    Breast cancer Maternal Aunt            Tobacco Use    Smoking status: Never    Smokeless tobacco: Never   Substance and Sexual Activity    Alcohol use: Never    Drug use: Never    Sexual activity: Not on file       Review of Systems   Constitutional:  Positive for activity change. Negative for chills and fever.   HENT:  Negative for facial swelling.    Respiratory:  Negative for chest tightness and shortness of breath.    Cardiovascular:  Negative for chest pain and leg swelling.   Gastrointestinal:  Positive for abdominal pain and nausea. Negative for vomiting.   Genitourinary:  Positive for flank pain.   Musculoskeletal:  Positive for back pain.   Skin:  Negative for rash.   Neurological:  Negative for speech difficulty and numbness.   Psychiatric/Behavioral:  Positive for decreased concentration and  dysphoric mood.        Objective:     Temp:  [98.3 °F (36.8 °C)-99.5 °F (37.5 °C)] 99.5 °F (37.5 °C)  Pulse:  [47-65] 65  Resp:  [15-17] 17  SpO2:  [92 %-97 %] 94 %  BP: (114-171)/(54-69) 171/69  Weight: 91.6 kg (202 lb)  Body mass index is 38.17 kg/m².    Date 03/08/25 0700 - 03/09/25 0659   Shift 0310-6915 1131-6663 5734-9869 24 Hour Total   INTAKE   P.O. 240 120  360   I.V.(mL/kg)  1000(10.9)  1000(10.9)   Shift Total(mL/kg) 240(2.6) 1120(12.2)  1360(14.8)   OUTPUT   Urine(mL/kg/hr)  400  400   Shift Total(mL/kg)  400(4.4)  400(4.4)   Weight (kg) 91.6 91.6 91.6 91.6     Bladder Scan Volume (mL): 68 mL (03/08/25 1432)    Drains       None                    Physical Exam  Vitals and nursing note reviewed.   Constitutional:       General: She is not in acute distress.     Appearance: She is well-developed. She is not toxic-appearing or diaphoretic.   HENT:      Head: Normocephalic.   Eyes:      Conjunctiva/sclera: Conjunctivae normal.   Cardiovascular:      Rate and Rhythm: Normal rate.   Pulmonary:      Effort: Pulmonary effort is normal. No respiratory distress.      Breath sounds: No wheezing.   Abdominal:      General: There is no distension.      Palpations: There is no mass.      Tenderness: There is abdominal tenderness. There is left CVA tenderness. There is no rebound.      Hernia: No hernia is present.   Musculoskeletal:         General: No tenderness or deformity.   Skin:     General: Skin is warm.      Findings: No erythema or rash.   Neurological:      Mental Status: She is alert and oriented to person, place, and time.      Motor: No abnormal muscle tone.   Psychiatric:         Attention and Perception: Attention normal.         Mood and Affect: Affect normal.         Speech: Speech normal.         Behavior: Behavior normal.         Thought Content: Thought content normal.         Cognition and Memory: Cognition and memory normal.         Judgment: Judgment normal.          Significant  Labs:    BMP:  Recent Labs   Lab 03/07/25  0533 03/08/25  0403   * 137   K 3.5 3.2*   CL 95* 106   CO2 21* 19*   BUN 52* 54*   CREATININE 2.88* 2.79*   CALCIUM 9.1 7.9*       CBC:  Recent Labs   Lab 03/07/25  0533 03/07/25  0925 03/08/25  0403   WBC 19.70* 17.20* 11.09*   HGB 12.1 11.4* 10.8*   HCT 35.9* 34.0* 32.5*    269 247       All pertinent labs results from the past 24 hours have been reviewed.    Significant Imaging:  All pertinent imaging results/findings from the past 24 hours have been reviewed.                    Assessment and Plan:     * MINOR (acute kidney injury)       Obstructive pyelonephritis       Hydronephrosis, left           VTE Risk Mitigation (From admission, onward)           Ordered     heparin (porcine) injection 5,000 Units  Every 8 hours         03/07/25 1432     IP VTE HIGH RISK PATIENT  Once         03/07/25 1432     Place sequential compression device  Until discontinued         03/07/25 1432                    ARETHA KELLER MD  Urology  [facility]

## 2025-03-09 NOTE — ANESTHESIA POSTPROCEDURE EVALUATION
Anesthesia Post Evaluation    Patient: Isaiah Neil    Procedure(s) Performed: Procedure(s) (LRB):  CYSTOURETEROSCOPY, WITH HOLMIUM LASER LITHOTRIPSY OF URETERAL CALCULUS AND STENT INSERTION (Left)    Final Anesthesia Type: general      Patient location during evaluation: PACU  Patient participation: Yes- Able to Participate  Level of consciousness: awake and sedated  Post-procedure vital signs: reviewed and stable  Pain management: adequate  Airway patency: patent    PONV status at discharge: No PONV  Anesthetic complications: no      Cardiovascular status: blood pressure returned to baseline  Respiratory status: unassisted  Hydration status: euvolemic  Follow-up not needed.              Vitals Value Taken Time   /52 03/09/25 11:05   Temp 36.8 °C (98.2 °F) 03/09/25 11:05   Pulse 52 03/09/25 11:05   Resp 16 03/09/25 11:05   SpO2 97 % 03/09/25 11:05         No case tracking events are documented in the log.      Pain/Tasia Score: Pain Rating Prior to Med Admin: 6 (3/9/2025  9:12 AM)  Pain Rating Post Med Admin: 0 (3/8/2025  9:11 PM)

## 2025-03-09 NOTE — PLAN OF CARE
[facility]  Initial Discharge Assessment       Primary Care Provider: Chrissy Powell DO (Inactive)    Admission Diagnosis: Abnormal laboratory test [R89.9]  MINOR (acute kidney injury) [N17.9]  Chest pain [R07.9]  Altered mental status, unspecified [R41.82]    Admission Date: 3/7/2025  Expected Discharge Date: 3/9/2025    Transition of Care Barriers: (P) None    Payor: MEDICARE / Plan: MEDICARE A ONLY / Product Type: Government /     Extended Emergency Contact Information  Primary Emergency Contact: Arnol Neil  Address: [address] [place] [address] [facility]  Home Phone: [phone]  Mobile Phone: [phone]  Relation: Spouse  Preferred language: English   needed? No    Discharge Plan A: (P) Home, Home with family  Discharge Plan B: (P) Home Health, Long-term acute care facility (LTAC), Rehab, Skilled Nursing Facility      [facility] [address] [place] [address] [address] [facility] [address]  [address] [facility] [address]  [facility] [address]  Phone: [phone] Fax: [phone]    [facility]  [address] [place] [address]  Phone: [phone] Fax: [phone]      Initial Assessment (most recent)       Adult Discharge Assessment - 03/09/25 1058          Discharge Assessment    Assessment Type Discharge Planning Assessment (P)      Confirmed/corrected address, phone number and insurance Yes (P)      Source of Information patient (P)      Communicated ALEXANDRE with patient/caregiver Date not available/Unable to determine (P)      People in Home spouse (P)    SpouseArnol [phone]    Do you expect to return to your current living situation? Yes (P)      Do you have help at home or someone to help you manage your care at home? Yes (P)      Who are your caregiver(s) and their phone number(s)? Arnol Villalobos [phone] (P)      Prior to hospitilization cognitive status: Alert/Oriented (P)      Current cognitive  status: Alert/Oriented (P)      Dressing/Bathing Difficulty no (P)      Home Layout Able to live on 1st floor (P)      Equipment Currently Used at Home none (P)      Readmission within 30 days? No (P)      Patient currently being followed by outpatient case management? No (P)      Do you currently have service(s) that help you manage your care at home? No (P)      Do you take prescription medications? Yes (P)      Do you have prescription coverage? Yes (P)      Coverage Medicare A (P)      Do you have any problems affording any of your prescribed medications? No (P)      Is the patient taking medications as prescribed? yes (P)      Who is going to help you get home at discharge? Family (P)      How do you get to doctors appointments? car, drives self;family or friend will provide (P)      Are you on dialysis? No (P)      Do you take coumadin? No (P)    Pt reprts to taking aspirin    Discharge Plan A Home;Home with family (P)      Discharge Plan B Home Health;Long-term acute care facility (LTAC);Rehab;Skilled Nursing Facility (P)      DME Needed Upon Discharge  none (P)      Discharge Plan discussed with: Patient (P)      Transition of Care Barriers None (P)         Physical Activity    On average, how many days per week do you engage in moderate to strenuous exercise (like a brisk walk)? 5 days (P)      On average, how many minutes do you engage in exercise at this level? 30 min (P)         Financial Resource Strain    How hard is it for you to pay for the very basics like food, housing, medical care, and heating? Not hard at all (P)         Housing Stability    In the last 12 months, was there a time when you were not able to pay the mortgage or rent on time? No (P)      At any time in the past 12 months, were you homeless or living in a shelter (including now)? No (P)         Transportation Needs    Has the lack of transportation kept you from medical appointments, meetings, work or from getting things needed for  daily living? No (P)         Food Insecurity    Within the past 12 months, you worried that your food would run out before you got the money to buy more. Never true (P)      Within the past 12 months, the food you bought just didn't last and you didn't have money to get more. Never true (P)         Stress    Do you feel stress - tense, restless, nervous, or anxious, or unable to sleep at night because your mind is troubled all the time - these days? Only a little (P)         Social Isolation    How often do you feel lonely or isolated from those around you?  Rarely (P)         Alcohol Use    Q1: How often do you have a drink containing alcohol? Never (P)      Q2: How many drinks containing alcohol do you have on a typical day when you are drinking? Patient does not drink (P)      Q3: How often do you have six or more drinks on one occasion? Never (P)         Utilities    In the past 12 months has the electric, gas, oil, or water company threatened to shut off services in your home? No (P)         Health Literacy    How often do you need to have someone help you when you read instructions, pamphlets, or other written material from your doctor or pharmacy? Rarely (P)         OTHER    Name(s) of People in Home Spouse, Arnol Neil [phone] (P)                  LSW contacted pt for initial assessment/dc planning. Pt was alert and oriented x3. Pt stated that she lives at home with her spouse, Arnol, and will return home when she is eligible for dc. Pt is independent with her ADLs and does not use any equipment for ambulation. Pt is insured with Medicare A. IM was obtained via verbal consent. SS will follow up with dc planning.

## 2025-03-09 NOTE — ASSESSMENT & PLAN NOTE
Obstructive pyelonephritis due to above.  UA suggestive of infection.  Urine culture with proteus.  Continue ceftriaxone.

## 2025-03-09 NOTE — ASSESSMENT & PLAN NOTE
MINOR is likely due to obstruction as above.   Baseline creatinine is 1.3.   Most recent creatinine and eGFR are listed below.  Recent Labs     03/07/25  0533 03/08/25  0403 03/09/25  0655   CREATININE 2.88* 2.79* 2.58*   EGFRNORACEVR 17* 18* 20*      Plan  - MINOR is improving.   - Urology plan as above.   - Continue IV fluids.   - Avoid nephrotoxins and renally dose meds for GFR listed above  - Monitor urine output, serial BMP, and adjust therapy as needed

## 2025-03-09 NOTE — ASSESSMENT & PLAN NOTE
Patient's blood pressure range in the last 24 hours was: BP  Min: 133/52  Max: 171/69.The patient's inpatient anti-hypertensive regimen is listed below:  Current Antihypertensives  , 2 times daily, Oral  , Daily, Oral  carvediloL tablet 25 mg, 2 times daily, Oral    Plan  - BP is controlled, no changes needed to their regimen  - Hold home ARB due to MINOR.

## 2025-03-09 NOTE — SUBJECTIVE & OBJECTIVE
Past Medical History:   Diagnosis Date    Digestive disorder     Hypertension     Mixed hyperlipidemia     Stroke     Thyroid disease        Past Surgical History:   Procedure Laterality Date     SECTION      HYSTERECTOMY      SHOULDER ARTHROSCOPY Right 5/3/2022    Procedure: ARTHROSCOPY, SHOULDER;  Surgeon: Davis Dunn MD;  Location: [facility];  Service: Orthopedics;  Laterality: Right;       Review of patient's allergies indicates:   Allergen Reactions    Azithromycin Nausea Only    Codeine phosphate Nausea Only    Lortab [hydrocodone-acetaminophen] Nausea Only    Sulfa (sulfonamide antibiotics) Nausea Only       Family History       Problem Relation (Age of Onset)    Breast cancer Maternal Aunt            Tobacco Use    Smoking status: Never    Smokeless tobacco: Never   Substance and Sexual Activity    Alcohol use: Never    Drug use: Never    Sexual activity: Not on file       Review of Systems   Constitutional:  Positive for activity change. Negative for chills and fever.   HENT:  Negative for facial swelling.    Respiratory:  Negative for chest tightness and shortness of breath.    Cardiovascular:  Negative for chest pain and leg swelling.   Gastrointestinal:  Positive for abdominal pain and nausea. Negative for vomiting.   Genitourinary:  Positive for flank pain.   Musculoskeletal:  Positive for back pain.   Skin:  Negative for rash.   Neurological:  Negative for speech difficulty and numbness.   Psychiatric/Behavioral:  Positive for decreased concentration and dysphoric mood.        Objective:     Temp:  [98.3 °F (36.8 °C)-99.5 °F (37.5 °C)] 99.5 °F (37.5 °C)  Pulse:  [47-65] 65  Resp:  [15-17] 17  SpO2:  [92 %-97 %] 94 %  BP: (114-171)/(54-69) 171/69  Weight: 91.6 kg (202 lb)  Body mass index is 38.17 kg/m².    Date 25 07 - 25 0659   Shift 9563-9987 6336-3043 8209-9470 24 Hour Total   INTAKE   P.O. 240 120  360   I.V.(mL/kg)  1000(10.9)  1000(10.9)   Shift Total(mL/kg)  240(2.6) 1120(12.2)  1360(14.8)   OUTPUT   Urine(mL/kg/hr)  400  400   Shift Total(mL/kg)  400(4.4)  400(4.4)   Weight (kg) 91.6 91.6 91.6 91.6     Bladder Scan Volume (mL): 68 mL (03/08/25 1432)    Drains       None                    Physical Exam  Vitals and nursing note reviewed.   Constitutional:       General: She is not in acute distress.     Appearance: She is well-developed. She is not toxic-appearing or diaphoretic.   HENT:      Head: Normocephalic.   Eyes:      Conjunctiva/sclera: Conjunctivae normal.   Cardiovascular:      Rate and Rhythm: Normal rate.   Pulmonary:      Effort: Pulmonary effort is normal. No respiratory distress.      Breath sounds: No wheezing.   Abdominal:      General: There is no distension.      Palpations: There is no mass.      Tenderness: There is abdominal tenderness. There is left CVA tenderness. There is no rebound.      Hernia: No hernia is present.   Musculoskeletal:         General: No tenderness or deformity.   Skin:     General: Skin is warm.      Findings: No erythema or rash.   Neurological:      Mental Status: She is alert and oriented to person, place, and time.      Motor: No abnormal muscle tone.   Psychiatric:         Attention and Perception: Attention normal.         Mood and Affect: Affect normal.         Speech: Speech normal.         Behavior: Behavior normal.         Thought Content: Thought content normal.         Cognition and Memory: Cognition and memory normal.         Judgment: Judgment normal.          Significant Labs:    BMP:  Recent Labs   Lab 03/07/25  0533 03/08/25  0403   * 137   K 3.5 3.2*   CL 95* 106   CO2 21* 19*   BUN 52* 54*   CREATININE 2.88* 2.79*   CALCIUM 9.1 7.9*       CBC:  Recent Labs   Lab 03/07/25  0533 03/07/25  0925 03/08/25  0403   WBC 19.70* 17.20* 11.09*   HGB 12.1 11.4* 10.8*   HCT 35.9* 34.0* 32.5*    269 247       All pertinent labs results from the past 24 hours have been reviewed.    Significant Imaging:  All  pertinent imaging results/findings from the past 24 hours have been reviewed.

## 2025-03-09 NOTE — HPI
The patient is a 69-year-old female that presented to the emergency room with pyelonephritis and left flank pain associated with acute kidney injury and reports that she has had flank pain and foul smelling urine for approximately a week.  She reports that the flank pain radiates to the flank and back and that she has associated nausea.  A comprehensive examination was performed demonstrating acute kidney injury and hydroureteronephrosis.  The patient was started on broad-spectrum antibiotics and Urology was consulted after an ultrasound identified hydronephrosis and CT scan confirmed a left ureteral stone.

## 2025-03-09 NOTE — ANESTHESIA RELEASE NOTE
"Anesthesia Release from PACU Note    Patient: Isaiah Neil    Procedure(s) Performed: Procedure(s) (LRB):  CYSTOURETEROSCOPY, WITH HOLMIUM LASER LITHOTRIPSY OF URETERAL CALCULUS AND STENT INSERTION (Left)    Anesthesia type: general    Post pain: Adequate analgesia    Post assessment: no apparent anesthetic complications    Last Vitals: Visit Vitals  BP (!) 133/52   Pulse (!) 52   Temp 36.8 °C (98.2 °F) (Oral)   Resp 16   Ht 5' 1" (1.549 m)   Wt 91.6 kg (202 lb)   SpO2 97%   BMI 38.17 kg/m²       Post vital signs: stable    Level of consciousness: awake    Nausea/Vomiting: no nausea/no vomiting    Complications: none    Airway Patency: patent    Respiratory: unassisted    Cardiovascular: stable and blood pressure at baseline    Hydration: euvolemic    "

## 2025-03-09 NOTE — OP NOTE
DATE OF OPERATION: 3/9/2025     PREOPERATIVE DIAGNOSIS:   Obstructive Pyelonephritis  Acute Kidney Injury   6mm proximal left ureteral stone  2.7cm proximal left ureteral stone       POSTOPERATIVE DIAGNOSIS:   Same as above  Pyonephrosis      OPERATIONS PERFORMED:  Left Ureteroscopy with holmium laser lithotripsy   Retrograde Pyelography performance, supervision and interpretation  Placement of double-J stent 8fr TRIA x 24cm       SURGEON: Tl Arellano     ASSISTANT: John Dougherty     EBL: Trace       ANESTHESIA: General       DESCRIPTION OF OPERATION: The patient was brought to the operating room and underwent induction of general anesthesia without complications. She was positioned in the cystolithotomy position and prepped and draped. Cystoscopy was performed. The bladder mucosa demonstrated cystitsi with debris. The ureteral orifices were orthotopic. The left ureteral orifice was not effluxing. Under fluoroscopic guidance a 5fr ureteral catheter was advanced and a wire was manipulated past the stone and advanced to the renal pelvis. The ureteral catheter was attempted but it would not advance over the wire to the renal pelvis. Semi-rigid ureteroscopy was then performed. The ureteroscope was advanced to the leading stone in the ureter. The stone was pulverized using low wattage laser lithotripsy. There was immediate drainage of gross purulence. The ureteroscope was then removed and upon withdrawal the gross purulence was draining. The wire was converted into a ureteral catheter. The purulence was thick and there was no drip. A retrograde pyelogram was performed demonstrated pelvicaliectasis. The wire was converted into a TRIA 8fr double-J ureteral stent 24cm in length. The stent was seen curled in good position in the renal pelvis on fluoroscopy and in the bladder on cystoscopy. The bladder was drained. 10ml of 2% viscous lidocaine was instilled per urethra to help with comfort post procedure. The patient was  awakened and brought to the PACU in satisfactory condition. She tolerated the procedure well. Disposition: Plan to maintain the stent and villanueva catheter in place for maximal decompression. Once the infection clears and the urine is sterile we will plan for renoscopy with lithotripsy of the large stone.       Complications: None

## 2025-03-09 NOTE — PROGRESS NOTES
[facility] Medicine  Progress Note    Patient Name: Isaiah Neil  MRN: [id number]  Patient Class: IP- Inpatient   Admission Date: 3/7/2025  Length of Stay: 2 days  Attending Physician: Angus Wheatley MD  Primary Care Provider: Chrissy Powell DO (Inactive)        Subjective     Principal Problem:Hydronephrosis with urinary obstruction due to ureteral calculus        HPI:  Ms. Neil is a 69 Y O female with PMH of HTN, CVA, hypothyroidism who presented with the chief complaint of nausea, generalized weakness and lethargy. Patient reported she was in her usual state of health until 5 days ago when she noted feeling generally weak with lack of energy. This resulted in her having very poor PO intake and later she started to notice nausea. 2 days ago she had increased in frequency of urination with foul smell. She denies fever, chills, vomiting, diarrhea. She complains of vague left sided abdominal pain which is intermittent. No chest pain or shortness of breath reported. She has a dry cough. No ill contacts.     Overview/Hospital Course:  3/8- Renal ultrasound showed left hydronephrosis, checking CT for further evaluation. Continue ceftriaxone, urine culture with GNB. Cr not changed much, plan to check bladder scan.     3/9- CT showed large renal stone with left hydroureteronephrosis. Urology consulted and plan for stent placement +/- lithotripsy. Continue current management otherwise.     No new subjective & objective note has been filed under this hospital service since the last note was generated.        Assessment & Plan  MINOR (acute kidney injury)  MINOR is likely due to obstruction as above.   Baseline creatinine is  1.3 .   Most recent creatinine and eGFR are listed below.  Recent Labs     03/07/25  0533 03/08/25  0403 03/09/25  0655   CREATININE 2.88* 2.79* 2.58*   EGFRNORACEVR 17* 18* 20*      Plan  - MINOR is improving.   - Urology plan as above.   - Continue IV  fluids.   - Avoid nephrotoxins and renally dose meds for GFR listed above  - Monitor urine output, serial BMP, and adjust therapy as needed  Acute UTI  Obstructive pyelonephritis due to above.  UA suggestive of infection.  Urine culture with proteus.  Continue ceftriaxone.     Hyponatremia (Resolved: 3/9/2025)  Hyponatremia is likely due to Dehydration/hypovolemia. The patient's most recent sodium results are listed below.  Recent Labs     03/07/25  0533 03/08/25  0403 03/09/25  0655   * 137 138     Plan  - Correct the sodium by 4-6mEq in 24 hours.   - Obtain the following studies: none.  - Will treat the hyponatremia with IV fluids as follows: NaCl at 100cc/hr.  - Monitor sodium Daily.   - Patient hyponatremia is resolved with fluids.  Essential hypertension  Patient's blood pressure range in the last 24 hours was: BP  Min: 133/52  Max: 171/69.The patient's inpatient anti-hypertensive regimen is listed below:  Current Antihypertensives  , 2 times daily, Oral  , Daily, Oral  carvediloL tablet 25 mg, 2 times daily, Oral    Plan  - BP is controlled, no changes needed to their regimen  - Hold home ARB due to MINOR.   Hypothyroidism  Resume home levothyroxine.     History of CVA (cerebrovascular accident)  No residual deficits.  Resume home aspirin, statin.     Hypokalemia (Resolved: 3/9/2025)  Patient's most recent potassium results are listed below.   Recent Labs     03/07/25 0533 03/08/25  0403 03/09/25  0655   K 3.5 3.2* 3.9     Plan  - Replete potassium per protocol  - Monitor potassium Daily  - Patient's hypokalemia is stable  Hydronephrosis with urinary obstruction due to ureteral calculus  Vague left sided pain but with tenderness on palpation of left upper quadrant.  Renal US with left hydronephrosis.  CT renal stone showed mild to moderate left hydroureteronephrosis noting left perinephric and periureteral inflammation secondary to a large calculus within the left renal collecting system extending to the  ureter and a separate slightly more distal proximal ureteral calculus.   Urology consulted; case discussed with Dr. Arellano and plan for ureteral stent placement with possible lithotripsy on 3/9.  Continue antibiotic, pain control.     Obstructive pyelonephritis  As above.     VTE Risk Mitigation (From admission, onward)           Ordered     heparin (porcine) injection 5,000 Units  Every 8 hours         03/07/25 1432     IP VTE HIGH RISK PATIENT  Once         03/07/25 1432     Place sequential compression device  Until discontinued         03/07/25 1432                    Discharge Planning   ALEXANDRE: 3/10/2025     Code Status: Full Code   Medical Readiness for Discharge Date:   Discharge Plan A: Home, Home with family                Please place Justification for DME        JOSE BARNEY MD  Department of Hospital Medicine   [facility]

## 2025-03-09 NOTE — PROGRESS NOTES
Patient examined and chart reviewed. Vitals are satisfactory. Her pulse is in the 50s. She is feeling less distressed and pain. Her white count remains elevated and continues to demonstate MINOR. Plan for ureteral stent placement. We are hoping to get a stent up without needing ureteroscopy but are prepared for laser given the large burden in the ureter. Continue antibiotics.

## 2025-03-09 NOTE — ANESTHESIA PREPROCEDURE EVALUATION
03/09/2025  Isaiah Neil is a 69 y.o., female.      Pre-op Assessment    I have reviewed the Patient Summary Reports.     I have reviewed the Nursing Notes. I have reviewed the NPO Status.   I have reviewed the Medications.     Review of Systems  Anesthesia Hx:  No problems with previous Anesthesia                Social:  Non-Smoker, No Alcohol Use       Hematology/Oncology:  Hematology Normal   Oncology Normal                Hematology Comments: Hx hyponatremia, hypokalemia                    EENT/Dental:  EENT/Dental Normal           Cardiovascular:     Hypertension                                          Pulmonary:  Pulmonary Normal                       Renal/:  Chronic Renal Disease renal calculi  Obstructive pyelonephritis    Hydronephrosis LT             Hepatic/GI:  Hepatic/GI Normal                    Musculoskeletal:  Musculoskeletal Normal                Neurological:   CVA                                    Endocrine:   Hypothyroidism          Dermatological:  Skin Normal    Psych:  Psychiatric Normal                    Physical Exam  General: Well nourished    Airway:  Mallampati: II / II  Mouth Opening: Normal  TM Distance: > 6 cm  Tongue: Normal  Neck ROM: Normal ROM    Chest/Lungs:  Clear to auscultation, Normal Respiratory Rate    Heart:  Rate: Normal  Rhythm: Regular Rhythm        Anesthesia Plan  Type of Anesthesia, risks & benefits discussed:    Anesthesia Type: Gen Supraglottic Airway  Intra-op Monitoring Plan: Standard ASA Monitors  Post Op Pain Control Plan: multimodal analgesia  Induction:  IV  Informed Consent: Patient consented to blood products? Yes  ASA Score: 4  Day of Surgery Review of History & Physical: H&P Update referred to the surgeon/provider.I have interviewed and examined the patient. I have reviewed the patient's H&P dated:     Ready For Surgery From Anesthesia  Perspective.     .      Chemistry        Component Value Date/Time     03/09/2025 0655    K 3.9 03/09/2025 0655     (H) 03/09/2025 0655    CO2 18 (L) 03/09/2025 0655    BUN 44 (H) 03/09/2025 0655    CREATININE 2.58 (H) 03/09/2025 0655     03/09/2025 0655        Component Value Date/Time    CALCIUM 7.8 (L) 03/09/2025 0655    ALKPHOS 70 03/07/2025 0533    AST 54 (H) 03/07/2025 0533    ALT 37 03/07/2025 0533    BILITOT 0.5 03/07/2025 0533        Lab Results   Component Value Date    WBC 12.73 (H) 03/09/2025    HGB 10.9 (L) 03/09/2025    HCT 32.9 (L) 03/09/2025     03/09/2025     No results found for this or any previous visit.

## 2025-03-10 PROBLEM — M75.41 IMPINGEMENT SYNDROME OF RIGHT SHOULDER: Status: RESOLVED | Noted: 2022-03-23 | Resolved: 2025-03-10

## 2025-03-10 PROBLEM — E03.9 HYPOTHYROIDISM: Status: RESOLVED | Noted: 2025-03-07 | Resolved: 2025-03-10

## 2025-03-10 PROBLEM — M75.22 BICEPS TENDINITIS ON LEFT: Status: RESOLVED | Noted: 2022-03-23 | Resolved: 2025-03-10

## 2025-03-10 PROBLEM — M72.2 PLANTAR FASCIITIS, BILATERAL: Status: RESOLVED | Noted: 2022-02-16 | Resolved: 2025-03-10

## 2025-03-10 PROBLEM — M25.522 LEFT ELBOW PAIN: Status: RESOLVED | Noted: 2022-02-16 | Resolved: 2025-03-10

## 2025-03-10 PROBLEM — N11.1 OBSTRUCTIVE PYELONEPHRITIS: Status: RESOLVED | Noted: 2025-03-08 | Resolved: 2025-03-10

## 2025-03-10 LAB
ANION GAP SERPL CALCULATED.3IONS-SCNC: 18 MMOL/L (ref 7–16)
ANISOCYTOSIS BLD QL SMEAR: ABNORMAL
BASOPHILS # BLD AUTO: 0.02 K/UL (ref 0–0.2)
BASOPHILS NFR BLD AUTO: 0.1 % (ref 0–1)
BUN SERPL-MCNC: 37 MG/DL (ref 10–20)
BUN/CREAT SERPL: 13 (ref 6–20)
CALCIUM SERPL-MCNC: 8.2 MG/DL (ref 8.4–10.2)
CHLORIDE SERPL-SCNC: 108 MMOL/L (ref 98–107)
CO2 SERPL-SCNC: 17 MMOL/L (ref 23–31)
CREAT SERPL-MCNC: 2.8 MG/DL (ref 0.55–1.02)
DIFFERENTIAL METHOD BLD: ABNORMAL
EGFR (NO RACE VARIABLE) (RUSH/TITUS): 18 ML/MIN/1.73M2
EOSINOPHIL # BLD AUTO: 0.03 K/UL (ref 0–0.5)
EOSINOPHIL NFR BLD AUTO: 0.1 % (ref 1–4)
ERYTHROCYTE [DISTWIDTH] IN BLOOD BY AUTOMATED COUNT: 16.9 % (ref 11.5–14.5)
GLUCOSE SERPL-MCNC: 129 MG/DL (ref 82–115)
HCT VFR BLD AUTO: 33.7 % (ref 38–47)
HGB BLD-MCNC: 11 G/DL (ref 12–16)
IMM GRANULOCYTES # BLD AUTO: 0.53 K/UL (ref 0–0.04)
IMM GRANULOCYTES NFR BLD: 1.5 % (ref 0–0.4)
LYMPHOCYTES # BLD AUTO: 0.72 K/UL (ref 1–4.8)
LYMPHOCYTES NFR BLD AUTO: 2 % (ref 27–41)
LYMPHOCYTES NFR BLD MANUAL: 2 % (ref 27–41)
MCH RBC QN AUTO: 27 PG (ref 27–31)
MCHC RBC AUTO-ENTMCNC: 32.6 G/DL (ref 32–36)
MCV RBC AUTO: 82.6 FL (ref 80–96)
MONOCYTES # BLD AUTO: 1.14 K/UL (ref 0–0.8)
MONOCYTES NFR BLD AUTO: 3.1 % (ref 2–6)
MONOCYTES NFR BLD MANUAL: 4 % (ref 2–6)
MPC BLD CALC-MCNC: 11.7 FL (ref 9.4–12.4)
NEUTROPHILS # BLD AUTO: 34.03 K/UL (ref 1.8–7.7)
NEUTROPHILS NFR BLD AUTO: 93.2 % (ref 53–65)
NEUTS BAND NFR BLD MANUAL: 2 % (ref 1–5)
NEUTS SEG NFR BLD MANUAL: 92 % (ref 50–62)
NRBC # BLD AUTO: 0 X10E3/UL
NRBC, AUTO (.00): 0 %
OVALOCYTES BLD QL SMEAR: ABNORMAL
PLATELET # BLD AUTO: 345 K/UL (ref 150–400)
PLATELET MORPHOLOGY: NORMAL
POTASSIUM SERPL-SCNC: 4.3 MMOL/L (ref 3.5–5.1)
RBC # BLD AUTO: 4.08 M/UL (ref 4.2–5.4)
SODIUM SERPL-SCNC: 139 MMOL/L (ref 136–145)
UA COMPLETE W REFLEX CULTURE PNL UR: ABNORMAL
UA COMPLETE W REFLEX CULTURE PNL UR: ABNORMAL
WBC # BLD AUTO: 36.47 K/UL (ref 4.5–11)

## 2025-03-10 PROCEDURE — 63600175 PHARM REV CODE 636 W HCPCS: Performed by: ANESTHESIOLOGY

## 2025-03-10 PROCEDURE — 85025 COMPLETE CBC W/AUTO DIFF WBC: CPT | Performed by: INTERNAL MEDICINE

## 2025-03-10 PROCEDURE — 11000001 HC ACUTE MED/SURG PRIVATE ROOM

## 2025-03-10 PROCEDURE — 25000003 PHARM REV CODE 250: Performed by: INTERNAL MEDICINE

## 2025-03-10 PROCEDURE — 63600175 PHARM REV CODE 636 W HCPCS: Performed by: INTERNAL MEDICINE

## 2025-03-10 PROCEDURE — 80048 BASIC METABOLIC PNL TOTAL CA: CPT | Performed by: INTERNAL MEDICINE

## 2025-03-10 PROCEDURE — 99232 SBSQ HOSP IP/OBS MODERATE 35: CPT | Mod: ,,, | Performed by: HOSPITALIST

## 2025-03-10 PROCEDURE — 36415 COLL VENOUS BLD VENIPUNCTURE: CPT | Performed by: INTERNAL MEDICINE

## 2025-03-10 RX ADMIN — HEPARIN SODIUM 5000 UNITS: 5000 INJECTION, SOLUTION INTRAVENOUS; SUBCUTANEOUS at 05:03

## 2025-03-10 RX ADMIN — LEVOTHYROXINE SODIUM 112 MCG: 0.11 TABLET ORAL at 05:03

## 2025-03-10 RX ADMIN — CEFTRIAXONE SODIUM 1 G: 1 INJECTION, POWDER, FOR SOLUTION INTRAMUSCULAR; INTRAVENOUS at 03:03

## 2025-03-10 RX ADMIN — SODIUM CHLORIDE, POTASSIUM CHLORIDE, SODIUM LACTATE AND CALCIUM CHLORIDE 125 ML/HR: 600; 310; 30; 20 INJECTION, SOLUTION INTRAVENOUS at 11:03

## 2025-03-10 RX ADMIN — OXYCODONE HYDROCHLORIDE AND ACETAMINOPHEN 1 TABLET: 5; 325 TABLET ORAL at 10:03

## 2025-03-10 RX ADMIN — CARVEDILOL 25 MG: 25 TABLET, FILM COATED ORAL at 10:03

## 2025-03-10 RX ADMIN — HEPARIN SODIUM 5000 UNITS: 5000 INJECTION, SOLUTION INTRAVENOUS; SUBCUTANEOUS at 10:03

## 2025-03-10 RX ADMIN — ATORVASTATIN CALCIUM 40 MG: 40 TABLET, FILM COATED ORAL at 10:03

## 2025-03-10 RX ADMIN — SODIUM CHLORIDE, POTASSIUM CHLORIDE, SODIUM LACTATE AND CALCIUM CHLORIDE 125 ML/HR: 600; 310; 30; 20 INJECTION, SOLUTION INTRAVENOUS at 02:03

## 2025-03-10 RX ADMIN — HEPARIN SODIUM 5000 UNITS: 5000 INJECTION, SOLUTION INTRAVENOUS; SUBCUTANEOUS at 03:03

## 2025-03-10 RX ADMIN — ASPIRIN 81 MG: 81 TABLET, COATED ORAL at 10:03

## 2025-03-10 RX ADMIN — SODIUM CHLORIDE, POTASSIUM CHLORIDE, SODIUM LACTATE AND CALCIUM CHLORIDE 125 ML/HR: 600; 310; 30; 20 INJECTION, SOLUTION INTRAVENOUS at 10:03

## 2025-03-10 NOTE — PROGRESS NOTES
[facility]  Wound Care    Patient Name:  Isaiah Neil   MRN:  [id number]  Date: 3/10/2025  Diagnosis: Hydronephrosis with urinary obstruction due to ureteral calculus    History:     Past Medical History:   Diagnosis Date    Digestive disorder     Hypertension     Mixed hyperlipidemia     Stroke     Thyroid disease        Social History[1]    Precautions:     Allergies as of 03/07/2025 - Reviewed 03/07/2025   Allergen Reaction Noted    Azithromycin Nausea Only 05/03/2022    Codeine phosphate Nausea Only 09/02/2021    Lortab [hydrocodone-acetaminophen] Nausea Only 05/03/2022    Sulfa (sulfonamide antibiotics) Nausea Only 05/03/2022       WOC Assessment Details/Treatment     Narrative: Seen patient for initial preventative skin care measures.  Patient ambulates.  No foam borders, redness, or open wounds noted to bilateral heels and sacral.  Consult wound care of any findings.      03/10/2025        [1]   Social History  Socioeconomic History    Marital status:    Tobacco Use    Smoking status: Never    Smokeless tobacco: Never   Substance and Sexual Activity    Alcohol use: Never    Drug use: Never     Social Drivers of Health     Financial Resource Strain: Low Risk  (3/9/2025)    Overall Financial Resource Strain (CARDIA)     Difficulty of Paying Living Expenses: Not hard at all   Food Insecurity: No Food Insecurity (3/9/2025)    Hunger Vital Sign     Worried About Running Out of Food in the Last Year: Never true     Ran Out of Food in the Last Year: Never true   Physical Activity: Sufficiently Active (3/9/2025)    Exercise Vital Sign     Days of Exercise per Week: 5 days     Minutes of Exercise per Session: 30 min   Stress: No Stress Concern Present (3/9/2025)    [employer] - Occupational Stress Questionnaire     Feeling of Stress : Only a little   Recent Concern: Stress - Stress Concern Present (3/7/2025)    [employer] - Occupational Stress Questionnaire     Feeling of Stress : Very much   Housing Stability: Low Risk  (3/9/2025)    Housing Stability Vital Sign     Unable to Pay for Housing in the Last Year: No     Homeless in the Last Year: No

## 2025-03-10 NOTE — PROGRESS NOTES
KUB ordered to see if all of the contrast in the collecting system has drained since the ureteral stent was placed. She had thick pus in the collecting system. A double J stent was placed after the ureteral stone was pulverized to open up the lumen.

## 2025-03-10 NOTE — PLAN OF CARE
CM reviewed chart.  Patient plans to return home with family upon discharge.  CM will continue to follow for DC plans as they arise.

## 2025-03-10 NOTE — ASSESSMENT & PLAN NOTE
Followed by Urology; has renal stone; on Rocephin; has UTI with Proteus and Citrobacter; monitor Cr

## 2025-03-10 NOTE — PROGRESS NOTES
Urine Culture >100,000 Proteus mirabilis Abnormal    >100,000 Citrobacter koseri Abnormal         Resulting Agency:     Susceptibility     Proteus mirabilis Citrobacter koseri     ROSA ROSA     Ampicillin <=8 µg/ml Sensitive >16 µg/ml Resistant     Ampicillin/Sulbactam <=8/4 µg/ml Sensitive <=8/4 µg/ml Sensitive     Aztreonam <=4 µg/ml Sensitive <=4 µg/ml Sensitive     Cefazolin <=2 µg/ml Sensitive <=2 µg/ml Sensitive     Cefepime <=2 µg/ml Sensitive <=2 µg/ml Sensitive     Ceftazidime <=1 µg/ml Sensitive <=1 µg/ml Sensitive     Ceftazidime/Avibactam <=8 µg/ml Sensitive <=8 µg/ml Sensitive     Ceftriaxone <=1 µg/ml Sensitive <=1 µg/ml Sensitive     Ciprofloxacin <=0.25 µg/ml Sensitive <=0.25 µg/ml Sensitive     Ertapenem <=0.5 µg/ml Sensitive <=0.5 µg/ml Sensitive     Gentamicin <=2 µg/ml Sensitive <=2 µg/ml Sensitive     Meropenem <=1 µg/ml Sensitive <=1 µg/ml Sensitive     Nitrofurantoin >64 µg/ml Resistant <=32 µg/ml Sensitive     Piperacillin/Tazobactam <=8 µg/ml Sensitive <=8 µg/ml Sensitive     Tetracycline >8 µg/ml Resistant <=4 µg/ml Sensitive     Tobramycin <=2 µg/ml Sensitive <=2 µg/ml Sensitive     Trimeth/Sulfa <=2/38 µg/ml Sensitive <=2/38 µg/ml Sensitive                   Specimen Collected: 03/07/25 09:46 CST Last Resulted: 03/10/25 09:18 CDT       The urine culture with proteus is consistent with the large stones as this bacterium is known to alter the pH of the urine and create stones. There was gross purulence upon decompression yesterday and the leukocytosis is consistent with complicated intervention and plan is to maintain both the stents and the villanueva in place for maximal drainage. The renal function is not as anticipated as I was expecting it to show an improvement today and will need to trend this. It could represent residual impairment from long standing obstruction and a component of ATN. She is on culture directed antibiotics and we will continue this.

## 2025-03-10 NOTE — PROGRESS NOTES
[facility] - [address] [place] [address] Medicine  Progress Note    Patient Name: Isaiah Neil  MRN: [id number]  Patient Class: IP- Inpatient   Admission Date: 3/7/2025  Length of Stay: 3 days  Attending Physician: Olga Montiel DO  Primary Care Provider: Chrissy Powell DO (Inactive)        Subjective     Principal Problem:Hydronephrosis with urinary obstruction due to ureteral calculus        HPI:  Ms. Neil is a 69 Y O female with PMH of HTN, CVA, hypothyroidism who presented with the chief complaint of nausea, generalized weakness and lethargy. Patient reported she was in her usual state of health until 5 days ago when she noted feeling generally weak with lack of energy. This resulted in her having very poor PO intake and later she started to notice nausea. 2 days ago she had increased in frequency of urination with foul smell. She denies fever, chills, vomiting, diarrhea. She complains of vague left sided abdominal pain which is intermittent. No chest pain or shortness of breath reported. She has a dry cough. No ill contacts.     Overview/Hospital Course:  69 year old female presents with left hydronephrosis and renal stone; followed by Urology; no complaints    Vitals:    03/10/25 0053 03/10/25 0545 03/10/25 0718 03/10/25 1053   BP: (!) 144/66 (!) 179/76 (!) 188/75 (!) 150/66   Pulse: 84 79 72 63   Resp: 17 18 19 17   Temp: 98.6 °F (37 °C)  98.3 °F (36.8 °C) 98 °F (36.7 °C)   TempSrc: Oral  Oral Oral   SpO2: (!) 94% 95% 95% 96%   Weight:       Height:             PHYSICAL EXAM:    GEN: NAD; alert and oriented x 3  CVS: regular rate and rhythm; no murmurs  RESP: clear to auscultation bilaterally; no rhonchi, rales, or wheezes noted  GI: soft, non-tender, non-distended; + bowel sounds  EXTR: no clubbing, cyanosis, or edema        Assessment & Plan  MINOR (acute kidney injury)  Cr elevated but stable; monitor; due to hydronephrosis and renal stone  Acute UTI  Urine culture shows  Proteus and Citrobacter; on Rocephin  Essential hypertension  Blood pressure stable; continue current meds    History of CVA (cerebrovascular accident)  Stable  Hydronephrosis with urinary obstruction due to ureteral calculus  Followed by Urology; has renal stone; on Rocephin; has UTI with Proteus and Citrobacter; monitor Cr        Olga Montiel DO  Department of Hospital Medicine   [facility]

## 2025-03-11 LAB
ANION GAP SERPL CALCULATED.3IONS-SCNC: 12 MMOL/L (ref 7–16)
BASOPHILS # BLD AUTO: 0.08 K/UL (ref 0–0.2)
BASOPHILS NFR BLD AUTO: 0.3 % (ref 0–1)
BUN SERPL-MCNC: 39 MG/DL (ref 10–20)
BUN/CREAT SERPL: 21 (ref 6–20)
CALCIUM SERPL-MCNC: 7.9 MG/DL (ref 8.4–10.2)
CHLORIDE SERPL-SCNC: 107 MMOL/L (ref 98–107)
CO2 SERPL-SCNC: 20 MMOL/L (ref 23–31)
CREAT SERPL-MCNC: 1.9 MG/DL (ref 0.55–1.02)
CRENATED CELLS: ABNORMAL
DIFFERENTIAL METHOD BLD: ABNORMAL
EGFR (NO RACE VARIABLE) (RUSH/TITUS): 28 ML/MIN/1.73M2
EOSINOPHIL # BLD AUTO: 0.01 K/UL (ref 0–0.5)
EOSINOPHIL NFR BLD AUTO: 0 % (ref 1–4)
ERYTHROCYTE [DISTWIDTH] IN BLOOD BY AUTOMATED COUNT: 16.4 % (ref 11.5–14.5)
GLUCOSE SERPL-MCNC: 173 MG/DL (ref 82–115)
HCT VFR BLD AUTO: 32.2 % (ref 38–47)
HGB BLD-MCNC: 10.8 G/DL (ref 12–16)
IMM GRANULOCYTES # BLD AUTO: 0.75 K/UL (ref 0–0.04)
IMM GRANULOCYTES NFR BLD: 2.4 % (ref 0–0.4)
LYMPHOCYTES # BLD AUTO: 0.95 K/UL (ref 1–4.8)
LYMPHOCYTES NFR BLD AUTO: 3.1 % (ref 27–41)
LYMPHOCYTES NFR BLD MANUAL: 2 % (ref 27–41)
MCH RBC QN AUTO: 26.9 PG (ref 27–31)
MCHC RBC AUTO-ENTMCNC: 33.5 G/DL (ref 32–36)
MCV RBC AUTO: 80.3 FL (ref 80–96)
MONOCYTES # BLD AUTO: 1.33 K/UL (ref 0–0.8)
MONOCYTES NFR BLD AUTO: 4.3 % (ref 2–6)
MONOCYTES NFR BLD MANUAL: 3 % (ref 2–6)
MPC BLD CALC-MCNC: 11.4 FL (ref 9.4–12.4)
NEUTROPHILS # BLD AUTO: 27.85 K/UL (ref 1.8–7.7)
NEUTROPHILS NFR BLD AUTO: 89.9 % (ref 53–65)
NEUTS BAND NFR BLD MANUAL: 1 % (ref 1–5)
NEUTS SEG NFR BLD MANUAL: 94 % (ref 50–62)
NRBC # BLD AUTO: 0 X10E3/UL
NRBC, AUTO (.00): 0 %
PLATELET # BLD AUTO: 309 K/UL (ref 150–400)
PLATELET MORPHOLOGY: ABNORMAL
POTASSIUM SERPL-SCNC: 4 MMOL/L (ref 3.5–5.1)
RBC # BLD AUTO: 4.01 M/UL (ref 4.2–5.4)
SODIUM SERPL-SCNC: 135 MMOL/L (ref 136–145)
TARGETS BLD QL SMEAR: ABNORMAL
WBC # BLD AUTO: 30.97 K/UL (ref 4.5–11)

## 2025-03-11 PROCEDURE — 80048 BASIC METABOLIC PNL TOTAL CA: CPT | Performed by: HOSPITALIST

## 2025-03-11 PROCEDURE — 99231 SBSQ HOSP IP/OBS SF/LOW 25: CPT | Mod: GT,,, | Performed by: UROLOGY

## 2025-03-11 PROCEDURE — 25000003 PHARM REV CODE 250: Performed by: INTERNAL MEDICINE

## 2025-03-11 PROCEDURE — 11000001 HC ACUTE MED/SURG PRIVATE ROOM

## 2025-03-11 PROCEDURE — 99232 SBSQ HOSP IP/OBS MODERATE 35: CPT | Mod: ,,, | Performed by: HOSPITALIST

## 2025-03-11 PROCEDURE — 63600175 PHARM REV CODE 636 W HCPCS: Performed by: ANESTHESIOLOGY

## 2025-03-11 PROCEDURE — 85025 COMPLETE CBC W/AUTO DIFF WBC: CPT | Performed by: HOSPITALIST

## 2025-03-11 PROCEDURE — 63600175 PHARM REV CODE 636 W HCPCS: Performed by: INTERNAL MEDICINE

## 2025-03-11 PROCEDURE — 36415 COLL VENOUS BLD VENIPUNCTURE: CPT | Performed by: HOSPITALIST

## 2025-03-11 RX ADMIN — LEVOTHYROXINE SODIUM 112 MCG: 0.11 TABLET ORAL at 06:03

## 2025-03-11 RX ADMIN — SODIUM CHLORIDE, POTASSIUM CHLORIDE, SODIUM LACTATE AND CALCIUM CHLORIDE 125 ML/HR: 600; 310; 30; 20 INJECTION, SOLUTION INTRAVENOUS at 06:03

## 2025-03-11 RX ADMIN — HEPARIN SODIUM 5000 UNITS: 5000 INJECTION, SOLUTION INTRAVENOUS; SUBCUTANEOUS at 09:03

## 2025-03-11 RX ADMIN — SODIUM CHLORIDE, POTASSIUM CHLORIDE, SODIUM LACTATE AND CALCIUM CHLORIDE 125 ML/HR: 600; 310; 30; 20 INJECTION, SOLUTION INTRAVENOUS at 05:03

## 2025-03-11 RX ADMIN — CARVEDILOL 25 MG: 25 TABLET, FILM COATED ORAL at 08:03

## 2025-03-11 RX ADMIN — HEPARIN SODIUM 5000 UNITS: 5000 INJECTION, SOLUTION INTRAVENOUS; SUBCUTANEOUS at 06:03

## 2025-03-11 RX ADMIN — CEFTRIAXONE SODIUM 1 G: 1 INJECTION, POWDER, FOR SOLUTION INTRAMUSCULAR; INTRAVENOUS at 05:03

## 2025-03-11 RX ADMIN — CARVEDILOL 25 MG: 25 TABLET, FILM COATED ORAL at 09:03

## 2025-03-11 RX ADMIN — TRAZODONE HYDROCHLORIDE 25 MG: 50 TABLET ORAL at 09:03

## 2025-03-11 RX ADMIN — HEPARIN SODIUM 5000 UNITS: 5000 INJECTION, SOLUTION INTRAVENOUS; SUBCUTANEOUS at 05:03

## 2025-03-11 RX ADMIN — ATORVASTATIN CALCIUM 40 MG: 40 TABLET, FILM COATED ORAL at 09:03

## 2025-03-11 RX ADMIN — ASPIRIN 81 MG: 81 TABLET, COATED ORAL at 09:03

## 2025-03-11 NOTE — PROGRESS NOTES
Patient examined and chart reviewed.  Mrs. Neil reports that the pain has resolved.  She is bradycardic.  She is oxygenating well.  On examination there is residual tenderness as expected.  Her white blood cell count remains profound and significant and renal function is improving.  Her urine today is pink with less debris and the gross purulence is resolving.  Plan to continue hydration continue maximal decompression with stent and Duval in place and continue broad-spectrum antibiotics.

## 2025-03-11 NOTE — PROGRESS NOTES
[facility] Hospital Medicine  Progress Note    Patient Name: Isaiah Neil  MRN: [id number]  Patient Class: IP- Inpatient   Admission Date: 3/7/2025  Length of Stay: 4 days  Attending Physician: Olga Montiel DO  Primary Care Provider: Chrissy Powell DO (Inactive)        Subjective     Principal Problem:Hydronephrosis with urinary obstruction due to ureteral calculus        HPI:  Ms. Neil is a 69 Y O female with PMH of HTN, CVA, hypothyroidism who presented with the chief complaint of nausea, generalized weakness and lethargy. Patient reported she was in her usual state of health until 5 days ago when she noted feeling generally weak with lack of energy. This resulted in her having very poor PO intake and later she started to notice nausea. 2 days ago she had increased in frequency of urination with foul smell. She denies fever, chills, vomiting, diarrhea. She complains of vague left sided abdominal pain which is intermittent. No chest pain or shortness of breath reported. She has a dry cough. No ill contacts.     Overview/Hospital Course:  69 year old female presents with left hydronephrosis and renal stone; followed by Urology; no complaints    Vitals:    03/11/25 0037 03/11/25 0522 03/11/25 0900 03/11/25 1130   BP: 138/64 (!) 151/66 (!) 147/67 (!) 148/62   BP Location:   Left forearm Left arm   Patient Position:   Lying Lying   Pulse: (!) 53 (!) 45 62 (!) 53   Resp:   15 16   Temp: 97.3 °F (36.3 °C)  98 °F (36.7 °C) 97.3 °F (36.3 °C)   TempSrc: Oral  Oral Oral   SpO2: 96% (!) 94% 96% 98%   Weight:       Height:             PHYSICAL EXAMINATION:    GEN: NAD; alert and oriented x 3  CVS: regular rate and rhythm  RESP: normal respiratory effort; no audible wheezing noted  GI: non-distended  EXTR: no swelling noted        Assessment & Plan  MINOR (acute kidney injury)  Cr improved; monitor; due to hydronephrosis and renal stone  Hydronephrosis with urinary  obstruction due to ureteral calculus  Followed by Urology; has renal stone; on Rocephin; has UTI with Proteus and Citrobacter; monitor Cr  Acute UTI  Urine culture shows Proteus and Citrobacter; on Rocephin  Essential hypertension  Blood pressure stable; continue current meds    History of CVA (cerebrovascular accident)  Stable        Olga Montiel DO  Department of Hospital Medicine   [facility]

## 2025-03-12 VITALS
RESPIRATION RATE: 15 BRPM | BODY MASS INDEX: 38.14 KG/M2 | WEIGHT: 202 LBS | HEIGHT: 61 IN | DIASTOLIC BLOOD PRESSURE: 67 MMHG | OXYGEN SATURATION: 97 % | SYSTOLIC BLOOD PRESSURE: 161 MMHG | HEART RATE: 54 BPM | TEMPERATURE: 98 F

## 2025-03-12 LAB
ANION GAP SERPL CALCULATED.3IONS-SCNC: 13 MMOL/L (ref 7–16)
BASOPHILS # BLD AUTO: 0.04 K/UL (ref 0–0.2)
BASOPHILS NFR BLD AUTO: 0.2 % (ref 0–1)
BUN SERPL-MCNC: 32 MG/DL (ref 10–20)
BUN/CREAT SERPL: 22 (ref 6–20)
CALCIUM SERPL-MCNC: 7.8 MG/DL (ref 8.4–10.2)
CHLORIDE SERPL-SCNC: 108 MMOL/L (ref 98–107)
CO2 SERPL-SCNC: 21 MMOL/L (ref 23–31)
CREAT SERPL-MCNC: 1.48 MG/DL (ref 0.55–1.02)
DIFFERENTIAL METHOD BLD: ABNORMAL
EGFR (NO RACE VARIABLE) (RUSH/TITUS): 38 ML/MIN/1.73M2
EOSINOPHIL # BLD AUTO: 0.06 K/UL (ref 0–0.5)
EOSINOPHIL NFR BLD AUTO: 0.3 % (ref 1–4)
ERYTHROCYTE [DISTWIDTH] IN BLOOD BY AUTOMATED COUNT: 16.6 % (ref 11.5–14.5)
GLUCOSE SERPL-MCNC: 125 MG/DL (ref 82–115)
HCT VFR BLD AUTO: 33.3 % (ref 38–47)
HGB BLD-MCNC: 11.1 G/DL (ref 12–16)
IMM GRANULOCYTES # BLD AUTO: 0.85 K/UL (ref 0–0.04)
IMM GRANULOCYTES NFR BLD: 3.6 % (ref 0–0.4)
LYMPHOCYTES # BLD AUTO: 1.89 K/UL (ref 1–4.8)
LYMPHOCYTES NFR BLD AUTO: 8 % (ref 27–41)
LYMPHOCYTES NFR BLD MANUAL: 6 % (ref 27–41)
MCH RBC QN AUTO: 26.9 PG (ref 27–31)
MCHC RBC AUTO-ENTMCNC: 33.3 G/DL (ref 32–36)
MCV RBC AUTO: 80.8 FL (ref 80–96)
MONOCYTES # BLD AUTO: 1.34 K/UL (ref 0–0.8)
MONOCYTES NFR BLD AUTO: 5.7 % (ref 2–6)
MONOCYTES NFR BLD MANUAL: 4 % (ref 2–6)
MPC BLD CALC-MCNC: 10.5 FL (ref 9.4–12.4)
NEUTROPHILS # BLD AUTO: 19.48 K/UL (ref 1.8–7.7)
NEUTROPHILS NFR BLD AUTO: 82.2 % (ref 53–65)
NEUTS BAND NFR BLD MANUAL: 2 % (ref 1–5)
NEUTS SEG NFR BLD MANUAL: 88 % (ref 50–62)
NRBC # BLD AUTO: 0 X10E3/UL
NRBC, AUTO (.00): 0 %
PLATELET # BLD AUTO: 336 K/UL (ref 150–400)
PLATELET MORPHOLOGY: NORMAL
POTASSIUM SERPL-SCNC: 4.1 MMOL/L (ref 3.5–5.1)
RBC # BLD AUTO: 4.12 M/UL (ref 4.2–5.4)
SODIUM SERPL-SCNC: 138 MMOL/L (ref 136–145)
TARGETS BLD QL SMEAR: ABNORMAL
WBC # BLD AUTO: 23.66 K/UL (ref 4.5–11)

## 2025-03-12 PROCEDURE — 25000003 PHARM REV CODE 250: Performed by: INTERNAL MEDICINE

## 2025-03-12 PROCEDURE — 25000003 PHARM REV CODE 250: Performed by: HOSPITALIST

## 2025-03-12 PROCEDURE — 36415 COLL VENOUS BLD VENIPUNCTURE: CPT | Performed by: HOSPITALIST

## 2025-03-12 PROCEDURE — 99239 HOSP IP/OBS DSCHRG MGMT >30: CPT | Mod: ,,, | Performed by: HOSPITALIST

## 2025-03-12 PROCEDURE — 85025 COMPLETE CBC W/AUTO DIFF WBC: CPT | Performed by: HOSPITALIST

## 2025-03-12 PROCEDURE — 99232 SBSQ HOSP IP/OBS MODERATE 35: CPT | Mod: ,,, | Performed by: UROLOGY

## 2025-03-12 PROCEDURE — 80048 BASIC METABOLIC PNL TOTAL CA: CPT | Performed by: HOSPITALIST

## 2025-03-12 PROCEDURE — 63600175 PHARM REV CODE 636 W HCPCS: Performed by: INTERNAL MEDICINE

## 2025-03-12 RX ORDER — AMLODIPINE BESYLATE 10 MG/1
10 TABLET ORAL DAILY
Status: DISCONTINUED | OUTPATIENT
Start: 2025-03-12 | End: 2025-03-12 | Stop reason: HOSPADM

## 2025-03-12 RX ORDER — AMLODIPINE BESYLATE 10 MG/1
10 TABLET ORAL DAILY
Qty: 30 TABLET | Refills: 0 | Status: SHIPPED | OUTPATIENT
Start: 2025-03-12 | End: 2025-04-11

## 2025-03-12 RX ORDER — CEFDINIR 300 MG/1
300 CAPSULE ORAL EVERY 12 HOURS
Status: DISCONTINUED | OUTPATIENT
Start: 2025-03-12 | End: 2025-03-12 | Stop reason: HOSPADM

## 2025-03-12 RX ORDER — CEFDINIR 300 MG/1
300 CAPSULE ORAL EVERY 12 HOURS
Qty: 28 CAPSULE | Refills: 0 | Status: SHIPPED | OUTPATIENT
Start: 2025-03-12 | End: 2025-03-26

## 2025-03-12 RX ADMIN — CARVEDILOL 25 MG: 25 TABLET, FILM COATED ORAL at 08:03

## 2025-03-12 RX ADMIN — HEPARIN SODIUM 5000 UNITS: 5000 INJECTION, SOLUTION INTRAVENOUS; SUBCUTANEOUS at 05:03

## 2025-03-12 RX ADMIN — HEPARIN SODIUM 5000 UNITS: 5000 INJECTION, SOLUTION INTRAVENOUS; SUBCUTANEOUS at 02:03

## 2025-03-12 RX ADMIN — LEVOTHYROXINE SODIUM 112 MCG: 0.11 TABLET ORAL at 06:03

## 2025-03-12 RX ADMIN — ATORVASTATIN CALCIUM 40 MG: 40 TABLET, FILM COATED ORAL at 08:03

## 2025-03-12 RX ADMIN — ASPIRIN 81 MG: 81 TABLET, COATED ORAL at 08:03

## 2025-03-12 RX ADMIN — AMLODIPINE BESYLATE 10 MG: 10 TABLET ORAL at 02:03

## 2025-03-12 NOTE — DISCHARGE SUMMARY
[facility] Hospital Medicine  Discharge Summary      Patient Name: Isaiah Neil  MRN: [id number]  [facility]: [id number]  Patient Class: IP- Inpatient  Admission Date: 3/7/2025  Hospital Length of Stay: 5 days  Discharge Date and Time:  03/12/2025 1:45 PM  Attending Physician: Olga Montiel DO   Discharging Provider: Olga Montiel DO  Primary Care Provider: Chrissy Powell DO (Inactive)    HPI:   Ms. Neil is a 69 Y O female with PMH of HTN, CVA, hypothyroidism who presented with the chief complaint of nausea, generalized weakness and lethargy. Patient reported she was in her usual state of health until 5 days ago when she noted feeling generally weak with lack of energy. This resulted in her having very poor PO intake and later she started to notice nausea. 2 days ago she had increased in frequency of urination with foul smell. She denies fever, chills, vomiting, diarrhea. She complains of vague left sided abdominal pain which is intermittent. No chest pain or shortness of breath reported. She has a dry cough. No ill contacts.     Procedure(s) (LRB):  CYSTOURETEROSCOPY, WITH HOLMIUM LASER LITHOTRIPSY OF URETERAL CALCULUS AND STENT INSERTION (Left)      Hospital Course:   69 year old female presents with nausea an weakness.  She went to her PCP and was found to have an elevated Cr level.  She was sent to the ER.  Renal U/S showed hydronephrosis.  She had a UTI and was placed on Rocephin.  Urology was consulted who placed a Duval.  CT scan showed a large stone and obstruction.  Urology placed a ureteral stent and performed lithotripsy. Urine culture showed Proteus and Citrobacter, and she was placed on Rocephin for her sepsis.  She continued to improve and is now feeling better.  She will go home to complete a 14 day course of Omnicef and will follow up with Urology in 3 weeks as an outpt.  Patient denies chest pain, shortness of breath, nausea, vomiting, or  diarrhea and is stable for discharge.       Vitals:    03/12/25 0400 03/12/25 0723 03/12/25 0848 03/12/25 1117   BP: (!) 175/79 (!) 175/66 (!) 175/66 (!) 161/67   Pulse: (!) 58 (!) 51  (!) 54   Resp: 16 16  15   Temp: 97.9 °F (36.6 °C) 98.4 °F (36.9 °C)  98 °F (36.7 °C)   TempSrc:  Oral  Oral   SpO2: 97% 96%  97%   Weight:       Height:             PHYSICAL EXAMINATION:    GEN: NAD; alert and oriented x 3  CVS: regular rate and rhythm  RESP: normal respiratory effort; no audible wheezing noted  GI: non-distended  EXTR: no swelling noted        Final Active Diagnoses:    Diagnosis Date Noted POA    PRINCIPAL PROBLEM:  Hydronephrosis with urinary obstruction due to ureteral calculus [N13.2] 03/08/2025 Yes    MINOR (acute kidney injury) [N17.9] 03/07/2025 Yes    Acute UTI [N39.0] 03/07/2025 Yes    History of CVA (cerebrovascular accident) [Z86.73] 03/07/2025 Not Applicable    Essential hypertension [I10] 03/07/2025 Yes      Problems Resolved During this Admission:    Diagnosis Date Noted Date Resolved POA    Hypokalemia [E87.6] 03/08/2025 03/09/2025 No    Obstructive pyelonephritis [N11.1] 03/08/2025 03/10/2025 Yes    Hyponatremia [E87.1] 03/07/2025 03/09/2025 Yes       Discharged Condition: stable    Disposition: home    Follow Up:   Follow-up Information       Tl Arellano MD Follow up in 3 week(s).    Specialty: Urology  Contact information:  [address] [facility] [address] [phone]                                Medications:  Reconciled Home Medications:      Medication List        START taking these medications      amLODIPine 10 MG tablet  Commonly known as: NORVASC  Take 1 tablet (10 mg total) by mouth once daily.     cefdinir 300 MG capsule  Commonly known as: OMNICEF  Take 1 capsule (300 mg total) by mouth every 12 (twelve) hours. for 14 days            CONTINUE taking these medications      aspirin 81 MG EC tablet  Commonly known as: ECOTRIN  Take 81 mg by mouth once daily.     atorvastatin 40 MG  tablet  Commonly known as: LIPITOR  Take 40 mg by mouth once daily.     carvediloL 25 MG tablet  Commonly known as: COREG  Take 25 mg by mouth 2 (two) times daily.     EUTHYROX 112 mcg tablet  Generic drug: levothyroxine  Take 112 mcg by mouth before breakfast.            STOP taking these medications      irbesartan 300 MG tablet  Commonly known as: AVAPRO              All of the information has been discussed with the patient who acknowledged verbal understanding.      Time spent on the discharge of patient: 45 minutes         Olga Montiel DO  Department of Hospital Medicine  [facility]

## 2025-03-12 NOTE — PROGRESS NOTES
Patient examined and chart reviewed.  KUB reviewed and demonstrate complete drainage of the contrast from the collecting system with the ureteral stent visualized in good position and residual calcification along the course of the ureter as expected with an impacted stone. She is bradycardic and normotensive.  She is lying in bed with a Duval catheter in place draining slightly pink urine with minimal debris.  She says she feels better today but is still weak. Her white blood cell count is down to 23,000 today and her creatinine is down to 1.48.  On examination there is improved tenderness over the left flank.  Her cultures in her blood have not revealed any growth and her urine culture has been finalized.      Impression  Obstructive pyelonephritis with pyonephrosis  Large left ureteral stone impacted status post ureteral stent placement and ureteroscopy  Acute kidney injury, improving  Proteus and Citrobacter complicated urinary tract infection/pyelonephritis    I have asked for the Duval catheter to be removed and the patient can be discharged on an extended course of culture directed antibiotics for 14 additional days with plans to follow up with Urology in 3 weeks to test the urine and make sure the bacteria has resolved prior to arranging definitive ureteroscopy and lithotripsy of the impacted large ureteral stone.  Please call with any questions.

## 2025-03-13 LAB
BACTERIA BLD CULT: NORMAL
BACTERIA BLD CULT: NORMAL

## 2025-03-13 NOTE — PLAN OF CARE
[facility] - [facility] Telemetry  Discharge Final Note    Primary Care Provider: Chrissy Powell DO (Inactive)    Expected Discharge Date: 3/12/2025    Final Discharge Note (most recent)       Final Note - 03/13/25 0814          Final Note    Assessment Type Final Discharge Note     Anticipated Discharge Disposition Home or Self Care     What phone number can be called within the next 1-3 days to see how you are doing after discharge? [phone]        Post-Acute Status    Coverage Medicare A/[facility]     Discharge Delays None known at this time                     Important Message from Medicare  Important Message from Medicare regarding Discharge Appeal Rights: Explained to patient/caregiver, Signed/date by patient/caregiver     Date IMM was signed: 03/12/25  Time IMM was signed: 1000    Contact Info       Tl Arellano MD   Specialty: Urology    [address] [facility] [address]   Phone: [phone]       Next Steps: Follow up in 3 week(s)          Patient will be discharged home with family today.  IM current.  No further needs.

## 2025-04-04 ENCOUNTER — HOSPITAL ENCOUNTER (OUTPATIENT)
Dept: RADIOLOGY | Facility: HOSPITAL | Age: 70
Discharge: HOME | End: 2025-04-04
Attending: UROLOGY
Payer: MEDICARE

## 2025-04-04 ENCOUNTER — OFFICE VISIT (OUTPATIENT)
Dept: UROLOGY | Facility: CLINIC | Age: 70
End: 2025-04-04
Payer: COMMERCIAL

## 2025-04-04 VITALS
HEART RATE: 68 BPM | OXYGEN SATURATION: 95 % | DIASTOLIC BLOOD PRESSURE: 68 MMHG | SYSTOLIC BLOOD PRESSURE: 158 MMHG | BODY MASS INDEX: 38.14 KG/M2 | HEIGHT: 61 IN | WEIGHT: 202 LBS

## 2025-04-04 DIAGNOSIS — N20.0 LEFT RENAL STONE: ICD-10-CM

## 2025-04-04 DIAGNOSIS — N11.1 OBSTRUCTIVE PYELONEPHRITIS: ICD-10-CM

## 2025-04-04 DIAGNOSIS — Z96.0 STATUS POST PLACEMENT OF URETERAL STENT: ICD-10-CM

## 2025-04-04 DIAGNOSIS — N20.1 LEFT URETERAL STONE: ICD-10-CM

## 2025-04-04 DIAGNOSIS — N13.2 HYDRONEPHROSIS WITH URINARY OBSTRUCTION DUE TO URETERAL CALCULUS: Primary | ICD-10-CM

## 2025-04-04 DIAGNOSIS — N13.2 HYDRONEPHROSIS WITH URINARY OBSTRUCTION DUE TO URETERAL CALCULUS: ICD-10-CM

## 2025-04-04 PROCEDURE — 99215 OFFICE O/P EST HI 40 MIN: CPT | Mod: PBBFAC,25 | Performed by: UROLOGY

## 2025-04-04 PROCEDURE — 99999 PR PBB SHADOW E&M-EST. PATIENT-LVL V: CPT | Mod: PBBFAC,,, | Performed by: UROLOGY

## 2025-04-04 PROCEDURE — 74018 RADEX ABDOMEN 1 VIEW: CPT | Mod: 26,,, | Performed by: RADIOLOGY

## 2025-04-04 PROCEDURE — 74018 RADEX ABDOMEN 1 VIEW: CPT | Mod: TC

## 2025-04-04 NOTE — PROGRESS NOTES
Assessment:   1. Hydronephrosis with urinary obstruction due to ureteral calculus  -     Case Request Operating Room: CYSTOURETEROSCOPY, WITH RETROGRADE PYELOGRAM AND URETERAL STENT INSERTION  -     Basic Metabolic Panel; Future; Expected date: 04/04/2025  -     CBC Auto Differential; Future; Expected date: 04/04/2025  -     X-Ray KUB; Future; Expected date: 04/04/2025  -     Urine Culture High Risk    2. Obstructive pyelonephritis  -     Case Request Operating Room: CYSTOURETEROSCOPY, WITH RETROGRADE PYELOGRAM AND URETERAL STENT INSERTION  -     Basic Metabolic Panel; Future; Expected date: 04/04/2025  -     CBC Auto Differential; Future; Expected date: 04/04/2025  -     Urine Culture High Risk    3. Status post placement of ureteral stent  -     Case Request Operating Room: CYSTOURETEROSCOPY, WITH RETROGRADE PYELOGRAM AND URETERAL STENT INSERTION  -     X-Ray KUB; Future; Expected date: 04/04/2025  -     Urine Culture High Risk    4. Left ureteral stone    5. Left renal stone         Plan:     We reviewed the KUB today and the indications for the ureteral stent and the large 2.7 cm stone in the proximal left ureter as well as the ureteral stone distally and discussed plans for ureteroscopy with possible renoscopy and holmium laser lithotripsy.  We discussed plans to check her urine today to ensure that it is sterile and submit blood work to check her blood count and her renal function.  We discussed the procedure of ureteroscopy and laser lithotripsy the expected outcome risks and benefits.           Tl Arellano MD  Urology  04/04/2025          UROLOGY HISTORY AND PHYSICAL EXAM    Subjective:      Patient ID: Isaiah Neil is a 69 y.o. female.    Chief Complaint::   HPI    The patient is a 69-year-old female that presented with obstructive pyelonephritis and was found to have a large 2.7 cm stone in the left ureter as well as a 6 mm stone in the proximal ureter who underwent ureteral stent placement.  She  presents today and reports that she is tolerating the stent well and denies any nausea and admits to occasional flank pain with voiding.  She denies any gross hematuria.  She denies any dysuria or and/or foul smell to the urine.       Past Medical History:   Diagnosis Date    Digestive disorder     Hypertension     Mixed hyperlipidemia     Stroke     Thyroid disease      Past Surgical History:   Procedure Laterality Date     SECTION      CYSTOURETEROSCOPY, WITH HOLMIUM LASER LITHOTRIPSY OF URETERAL CALCULUS AND STENT INSERTION Left 3/9/2025    Procedure: CYSTOURETEROSCOPY, WITH HOLMIUM LASER LITHOTRIPSY OF URETERAL CALCULUS AND STENT INSERTION;  Surgeon: Tl Arellano MD;  Location: [facility] OR;  Service: Urology;  Laterality: Left;    HYSTERECTOMY      SHOULDER ARTHROSCOPY Right 5/3/2022    Procedure: ARTHROSCOPY, SHOULDER;  Surgeon: Davis Dunn MD;  Location: [facility] OR;  Service: Orthopedics;  Laterality: Right;        Medications Ordered Prior to Encounter[1]     Review of patient's allergies indicates:   Allergen Reactions    Azithromycin Nausea Only    Codeine phosphate Nausea Only    Lortab [hydrocodone-acetaminophen] Nausea Only    Sulfa (sulfonamide antibiotics) Nausea Only     Vitals:    25 0904   BP: (!) 158/68   Pulse: 68          Review of Systems   Constitutional:  Negative for activity change and fever.   HENT:  Negative for congestion.    Respiratory:  Negative for shortness of breath.    Gastrointestinal:  Negative for abdominal distention, abdominal pain and vomiting.   Genitourinary:  Negative for dysuria and hematuria.   Musculoskeletal:  Positive for arthralgias.   Skin:  Negative for rash.   Neurological:  Negative for headaches.        Objective:     Physical Exam  Vitals and nursing note reviewed.   Constitutional:       General: She is not in acute distress.     Appearance: She is well-developed. She is not diaphoretic.   HENT:      Head: Normocephalic.    Eyes:      Conjunctiva/sclera: Conjunctivae normal.   Cardiovascular:      Rate and Rhythm: Normal rate.   Pulmonary:      Effort: Pulmonary effort is normal. No respiratory distress.      Breath sounds: No wheezing.   Abdominal:      General: There is no distension.      Palpations: Abdomen is soft.   Musculoskeletal:         General: No tenderness or deformity.   Skin:     General: Skin is warm.      Findings: No erythema or rash.   Neurological:      Mental Status: She is alert and oriented to person, place, and time.      Motor: No abnormal muscle tone.   Psychiatric:         Attention and Perception: Attention normal.         Mood and Affect: Affect normal.         Speech: Speech normal.         Behavior: Behavior normal.         Thought Content: Thought content normal.         Cognition and Memory: Cognition and memory normal.         Judgment: Judgment normal.              CMP  Sodium   Date Value Ref Range Status   04/04/2025 141 136 - 145 mmol/L Final     Potassium   Date Value Ref Range Status   04/04/2025 3.3 (L) 3.5 - 5.1 mmol/L Final     Chloride   Date Value Ref Range Status   04/04/2025 106 98 - 107 mmol/L Final     CO2   Date Value Ref Range Status   04/04/2025 24 23 - 31 mmol/L Final     Glucose   Date Value Ref Range Status   04/04/2025 131 (H) 82 - 115 mg/dL Final     BUN   Date Value Ref Range Status   04/04/2025 9 (L) 10 - 20 mg/dL Final     Creatinine   Date Value Ref Range Status   04/04/2025 1.21 (H) 0.55 - 1.02 mg/dL Final     Calcium   Date Value Ref Range Status   04/04/2025 9.3 8.4 - 10.2 mg/dL Final     Total Protein   Date Value Ref Range Status   03/07/2025 8.2 (H) 5.8 - 7.6 g/dL Final     Albumin   Date Value Ref Range Status   03/07/2025 2.8 (L) 3.4 - 4.8 g/dL Final     Bilirubin, Total   Date Value Ref Range Status   03/07/2025 0.5 <=1.5 mg/dL Final     Alk Phos   Date Value Ref Range Status   03/07/2025 70 40 - 150 U/L Final     AST   Date Value Ref Range Status   03/07/2025 54  (H) 5 - 34 U/L Final     ALT   Date Value Ref Range Status   03/07/2025 37 <=55 U/L Final     Anion Gap   Date Value Ref Range Status   04/04/2025 14 7 - 16 mmol/L Final     eGFR   Date Value Ref Range Status   04/04/2025 49 (L) >=60 mL/min/1.73m2 Final     Comment:     Estimated GFR calculated using the CKD-EPI creatinine (2021) equation.      BMP  Lab Results   Component Value Date     04/04/2025    K 3.3 (L) 04/04/2025     04/04/2025    CO2 24 04/04/2025    BUN 9 (L) 04/04/2025    CREATININE 1.21 (H) 04/04/2025    CALCIUM 9.3 04/04/2025    ANIONGAP 14 04/04/2025    EGFRNORACEVR 49 (L) 04/04/2025              [1]   Current Outpatient Medications on File Prior to Visit   Medication Sig Dispense Refill    amLODIPine (NORVASC) 10 MG tablet Take 1 tablet (10 mg total) by mouth once daily. 30 tablet 0    aspirin (ECOTRIN) 81 MG EC tablet Take 81 mg by mouth once daily.      atorvastatin (LIPITOR) 40 MG tablet Take 40 mg by mouth once daily.      carvediloL (COREG) 25 MG tablet Take 25 mg by mouth 2 (two) times daily.      EUTHYROX 112 mcg tablet Take 112 mcg by mouth before breakfast.       No current facility-administered medications on file prior to visit.

## 2025-04-05 LAB
UA COMPLETE W REFLEX CULTURE PNL UR: ABNORMAL
UA COMPLETE W REFLEX CULTURE PNL UR: ABNORMAL

## 2025-04-08 ENCOUNTER — TELEPHONE (OUTPATIENT)
Dept: UROLOGY | Facility: CLINIC | Age: 70
End: 2025-04-08
Payer: COMMERCIAL

## 2025-04-08 ENCOUNTER — RESULTS FOLLOW-UP (OUTPATIENT)
Dept: UROLOGY | Facility: HOSPITAL | Age: 70
End: 2025-04-08
Payer: COMMERCIAL

## 2025-04-08 DIAGNOSIS — N11.1 OBSTRUCTIVE PYELONEPHRITIS: Primary | ICD-10-CM

## 2025-04-08 RX ORDER — LEVOFLOXACIN 750 MG/1
750 TABLET ORAL DAILY
Qty: 14 TABLET | Refills: 0 | Status: SHIPPED | OUTPATIENT
Start: 2025-04-08 | End: 2025-04-22

## 2025-04-08 NOTE — PROGRESS NOTES
Please call Mrs. Neil and let her know her urine specimen grew significant bacteria and that I have submitted a prescription for levofloxacin for her to start as soon as possible. Please cancel her procedure and let her know that she needs to complete the antibiotics prior to performing ureteroscopy.  Please let her know that we need the urine to be sterile before performing lithotripsy on the stone so please have her complete therapy and return in 2-3 weeks to retest the urine and then we can reschedule the procedure.     Status: Final result       Dx: Hydronephrosis with urinary obstructi...      Specimen Information: Urine, Clean Catch   Urine Culture >100,000 Proteus mirabilis Abnormal    >100,000 Pseudomonas aeruginosa Abnormal         Resulting Agency:     Susceptibility       Proteus mirabilis Pseudomonas aeruginosa     ROSA ROSA     Ampicillin <=8 µg/ml Sensitive       Ampicillin/Sulbactam <=8/4 µg/ml Sensitive       Aztreonam <=4 µg/ml Sensitive <=4 µg/ml Sensitive     Cefazolin <=2 µg/ml Sensitive       Cefepime <=2 µg/ml Sensitive <=2 µg/ml Sensitive     Ceftazidime <=1 µg/ml Sensitive 4 µg/ml Sensitive     Ceftazidime/Avibactam <=8 µg/ml Sensitive <=8 µg/ml Sensitive     Ceftriaxone <=1 µg/ml Sensitive       Ciprofloxacin <=0.25 µg/ml Sensitive <=0.25 µg/ml Sensitive     Ertapenem <=0.5 µg/ml Sensitive       Gentamicin <=2 µg/ml Sensitive <=2 µg/ml Resistant     Meropenem <=1 µg/ml Sensitive <=1 µg/ml Sensitive     Nitrofurantoin >64 µg/ml Resistant       Piperacillin/Tazobactam <=8 µg/ml Sensitive <=8 µg/ml Sensitive     Tetracycline >8 µg/ml Resistant       Tobramycin <=2 µg/ml Sensitive <=2 µg/ml Sensitive     Trimeth/Sulfa <=2/38 µg/ml Sensitive

## 2025-04-08 NOTE — TELEPHONE ENCOUNTER
----- Message from Tl Arellano MD sent at 4/8/2025  7:03 AM CDT -----  Please call Mrs. Neil and let her know her urine specimen grew significant bacteria and that I have submitted a prescription for levofloxacin for her to start as soon as possible. Please cancel her   procedure and let her know that she needs to complete the antibiotics prior to performing ureteroscopy.  Please let her know that we need the urine to be sterile before performing lithotripsy on the   stone so please have her complete therapy and return in 2-3 weeks to retest the urine and then we can reschedule the procedure.   I called and spoke with the pt and relayed the above message to her.  I told her the antibiotics were sent to [facility] pharmacy on [address], start them as soon as possible, and she is scheduled to return to urology clinic on 4/28/25 at 8:30am.  She voiced understanding.    Status: Final result       Dx: Hydronephrosis with urinary obstructi...      Specimen Information: Urine, Clean Catch   Urine Culture >100,000 Proteus mirabilis Abnormal    >100,000 Pseudomonas aeruginosa Abnormal         Resulting Agency:     Susceptibility       Proteus mirabilis Pseudomonas aeruginosa     ROSA ROSA     Ampicillin <=8 µg/ml Sensitive       Ampicillin/Sulbactam <=8/4 µg/ml Sensitive       Aztreonam <=4 µg/ml Sensitive <=4 µg/ml Sensitive     Cefazolin <=2 µg/ml Sensitive       Cefepime <=2 µg/ml Sensitive <=2 µg/ml Sensitive     Ceftazidime <=1 µg/ml Sensitive 4 µg/ml Sensitive     Ceftazidime/Avibactam <=8 µg/ml Sensitive <=8 µg/ml Sensitive     Ceftriaxone <=1 µg/ml Sensitive       Ciprofloxacin <=0.25 µg/ml Sensitive <=0.25 µg/ml Sensitive     Ertapenem <=0.5 µg/ml Sensitive       Gentamicin <=2 µg/ml Sensitive <=2 µg/ml Resistant     Meropenem <=1 µg/ml Sensitive <=1 µg/ml Sensitive     Nitrofurantoin >64 µg/ml Resistant       Piperacillin/Tazobactam <=8 µg/ml Sensitive <=8 µg/ml Sensitive     Tetracycline >8 µg/ml Resistant        Tobramycin <=2 µg/ml Sensitive <=2 µg/ml Sensitive     Trimeth/Sulfa <=2/38 µg/ml Sensitive           ----- Message -----  From: Lab, Background User  Sent: 4/5/2025   9:12 AM CDT  To: Tl Arellano MD

## 2025-04-28 ENCOUNTER — HOSPITAL ENCOUNTER (OUTPATIENT)
Dept: RADIOLOGY | Facility: HOSPITAL | Age: 70
Discharge: HOME | End: 2025-04-28
Attending: UROLOGY
Payer: COMMERCIAL

## 2025-04-28 ENCOUNTER — CLINICAL SUPPORT (OUTPATIENT)
Dept: CARDIOLOGY | Facility: CLINIC | Age: 70
End: 2025-04-28
Payer: MEDICARE

## 2025-04-28 ENCOUNTER — OFFICE VISIT (OUTPATIENT)
Dept: UROLOGY | Facility: CLINIC | Age: 70
End: 2025-04-28
Payer: COMMERCIAL

## 2025-04-28 VITALS
OXYGEN SATURATION: 97 % | SYSTOLIC BLOOD PRESSURE: 186 MMHG | DIASTOLIC BLOOD PRESSURE: 87 MMHG | TEMPERATURE: 98 F | HEIGHT: 61 IN | WEIGHT: 195 LBS | BODY MASS INDEX: 36.82 KG/M2 | HEART RATE: 60 BPM | RESPIRATION RATE: 18 BRPM

## 2025-04-28 DIAGNOSIS — N20.0 LEFT RENAL STONE: ICD-10-CM

## 2025-04-28 DIAGNOSIS — N13.2 HYDRONEPHROSIS WITH URINARY OBSTRUCTION DUE TO URETERAL CALCULUS: Primary | ICD-10-CM

## 2025-04-28 DIAGNOSIS — N39.0 URINARY TRACT INFECTION DUE TO PROTEUS: ICD-10-CM

## 2025-04-28 DIAGNOSIS — Z96.0 STATUS POST PLACEMENT OF URETERAL STENT: ICD-10-CM

## 2025-04-28 DIAGNOSIS — N20.1 LEFT URETERAL STONE: ICD-10-CM

## 2025-04-28 DIAGNOSIS — N11.1 OBSTRUCTIVE PYELONEPHRITIS: ICD-10-CM

## 2025-04-28 DIAGNOSIS — B96.4 URINARY TRACT INFECTION DUE TO PROTEUS: ICD-10-CM

## 2025-04-28 DIAGNOSIS — N13.2 HYDRONEPHROSIS WITH URINARY OBSTRUCTION DUE TO URETERAL CALCULUS: ICD-10-CM

## 2025-04-28 DIAGNOSIS — R31.9 URINARY TRACT INFECTION WITH HEMATURIA, SITE UNSPECIFIED: ICD-10-CM

## 2025-04-28 DIAGNOSIS — A49.8 PSEUDOMONAS AERUGINOSA INFECTION: ICD-10-CM

## 2025-04-28 DIAGNOSIS — N39.0 URINARY TRACT INFECTION WITH HEMATURIA, SITE UNSPECIFIED: ICD-10-CM

## 2025-04-28 PROCEDURE — 3288F FALL RISK ASSESSMENT DOCD: CPT | Mod: CPTII,,, | Performed by: UROLOGY

## 2025-04-28 PROCEDURE — 4010F ACE/ARB THERAPY RXD/TAKEN: CPT | Mod: CPTII,,, | Performed by: UROLOGY

## 2025-04-28 PROCEDURE — 74018 RADEX ABDOMEN 1 VIEW: CPT | Mod: 26,,, | Performed by: INTERNAL MEDICINE

## 2025-04-28 PROCEDURE — 74018 RADEX ABDOMEN 1 VIEW: CPT | Mod: TC

## 2025-04-28 PROCEDURE — 87086 URINE CULTURE/COLONY COUNT: CPT | Mod: ,,, | Performed by: CLINICAL MEDICAL LABORATORY

## 2025-04-28 PROCEDURE — 3077F SYST BP >= 140 MM HG: CPT | Mod: CPTII,,, | Performed by: UROLOGY

## 2025-04-28 PROCEDURE — 1126F AMNT PAIN NOTED NONE PRSNT: CPT | Mod: CPTII,,, | Performed by: UROLOGY

## 2025-04-28 PROCEDURE — 99212 OFFICE O/P EST SF 10 MIN: CPT | Mod: PBBFAC,25

## 2025-04-28 PROCEDURE — 99999 PR PBB SHADOW E&M-EST. PATIENT-LVL II: CPT | Mod: PBBFAC,,,

## 2025-04-28 PROCEDURE — 1160F RVW MEDS BY RX/DR IN RCRD: CPT | Mod: CPTII,,, | Performed by: UROLOGY

## 2025-04-28 PROCEDURE — 99214 OFFICE O/P EST MOD 30 MIN: CPT | Mod: S$PBB,,, | Performed by: UROLOGY

## 2025-04-28 PROCEDURE — 99215 OFFICE O/P EST HI 40 MIN: CPT | Mod: PBBFAC,25 | Performed by: UROLOGY

## 2025-04-28 PROCEDURE — 99999 PR PBB SHADOW E&M-EST. PATIENT-LVL V: CPT | Mod: PBBFAC,,, | Performed by: UROLOGY

## 2025-04-28 PROCEDURE — 1159F MED LIST DOCD IN RCRD: CPT | Mod: CPTII,,, | Performed by: UROLOGY

## 2025-04-28 PROCEDURE — 1101F PT FALLS ASSESS-DOCD LE1/YR: CPT | Mod: CPTII,,, | Performed by: UROLOGY

## 2025-04-28 PROCEDURE — 3079F DIAST BP 80-89 MM HG: CPT | Mod: CPTII,,, | Performed by: UROLOGY

## 2025-04-28 PROCEDURE — 3008F BODY MASS INDEX DOCD: CPT | Mod: CPTII,,, | Performed by: UROLOGY

## 2025-04-28 RX ORDER — IRBESARTAN 300 MG/1
300 TABLET ORAL
COMMUNITY
Start: 2025-04-21

## 2025-04-28 RX ORDER — CIPROFLOXACIN 500 MG/1
750 TABLET ORAL EVERY 12 HOURS
Qty: 21 TABLET | Refills: 0 | Status: SHIPPED | OUTPATIENT
Start: 2025-04-28 | End: 2025-05-05

## 2025-04-28 NOTE — PROGRESS NOTES
Assessment:   1. Hydronephrosis with urinary obstruction due to ureteral calculus  -     Case Request Operating Room: CYSTOURETEROSCOPY, WITH HOLMIUM LASER LITHOTRIPSY OF URETERAL CALCULUS AND STENT INSERTION  -     EKG 12-lead; Future    2. Left ureteral stone  -     Case Request Operating Room: CYSTOURETEROSCOPY, WITH HOLMIUM LASER LITHOTRIPSY OF URETERAL CALCULUS AND STENT INSERTION    3. Left renal stone  -     Case Request Operating Room: CYSTOURETEROSCOPY, WITH HOLMIUM LASER LITHOTRIPSY OF URETERAL CALCULUS AND STENT INSERTION  -     X-Ray KUB; Future; Expected date: 04/28/2025    4. Obstructive pyelonephritis  -     Case Request Operating Room: CYSTOURETEROSCOPY, WITH HOLMIUM LASER LITHOTRIPSY OF URETERAL CALCULUS AND STENT INSERTION  -     Basic Metabolic Panel; Future; Expected date: 04/28/2025    5. Status post placement of ureteral stent    6. Pseudomonas aeruginosa infection  -     CBC Auto Differential; Future; Expected date: 04/28/2025  -     ciprofloxacin HCl (CIPRO) 500 MG tablet; Take 1.5 tablets (750 mg total) by mouth every 12 (twelve) hours. for 7 days  Dispense: 21 tablet; Refill: 0    7. Urinary tract infection due to Proteus  -     CBC Auto Differential; Future; Expected date: 04/28/2025  -     ciprofloxacin HCl (CIPRO) 500 MG tablet; Take 1.5 tablets (750 mg total) by mouth every 12 (twelve) hours. for 7 days  Dispense: 21 tablet; Refill: 0         Plan:       I have started her on ciprofloxacin after completion of her previous course of antibiotics and discussed my concern for an occluded ureteral stent with the persistent infection and recommended intervention with cystoscopy possible ureteroscopy with laser lithotripsy but more than likely ureteral stent exchange.  We discussed the presentation of the large stone in the history of Proteus and Pseudomonas which is concerning for active stone growth from bacterial infection and the possibility and need for multiple staged procedures for  eventual clearance of the collecting system.  We discussed that right now the goal is to promote clearance of the infection completely prior to performing laser lithotripsy electively given risk of septicemia.     Plan:  KUB today  12 lead EKG today  BMP and CBC today  Start ciprofloxacin today  Request time space available for cystoscopy possible ureteral stent exchange possible ureteroscopy with lithotripsy on Thursday          Tl Arellano MD  Urology  2025          UROLOGY HISTORY AND PHYSICAL EXAM    Subjective:      Patient ID: Isaiah Neil is a 69 y.o. female.    Chief Complaint::   HPI    The patient is a 69-year-old female that presented with obstructive pyelonephritis and a large stone in the proximal left ureter as well as an additional stone in the distal left ureter and underwent ureteral stent decompression and treatment with antibiotics.  Postprocedure she was found to have evidence of Proteus and Pseudomonas and recently completed a course of antibiotics and presents today for follow-up.  She reports that she is not having gross hematuria or dysuria and denies any fever.  She reports occasional urge incontinence and flank pain.       Past Medical History:   Diagnosis Date    Digestive disorder     Hypertension     Mixed hyperlipidemia     Stroke     Thyroid disease      Past Surgical History:   Procedure Laterality Date     SECTION      CYSTOURETEROSCOPY, WITH HOLMIUM LASER LITHOTRIPSY OF URETERAL CALCULUS AND STENT INSERTION Left 3/9/2025    Procedure: CYSTOURETEROSCOPY, WITH HOLMIUM LASER LITHOTRIPSY OF URETERAL CALCULUS AND STENT INSERTION;  Surgeon: Tl Arellano MD;  Location: [facility] OR;  Service: Urology;  Laterality: Left;    HYSTERECTOMY      SHOULDER ARTHROSCOPY Right 5/3/2022    Procedure: ARTHROSCOPY, SHOULDER;  Surgeon: Davis Dunn MD;  Location: [facility] OR;  Service: Orthopedics;  Laterality: Right;        Medications Ordered Prior to  Encounter[1]     Review of patient's allergies indicates:   Allergen Reactions    Azithromycin Nausea Only    Codeine phosphate Nausea Only    Lortab [hydrocodone-acetaminophen] Nausea Only    Sulfa (sulfonamide antibiotics) Nausea Only     Vitals:    04/28/25 0857   BP: (!) 186/87   Pulse:    Resp:    Temp:           Review of Systems   Constitutional:  Negative for chills and fever.   Eyes:  Negative for visual disturbance.   Respiratory:  Negative for shortness of breath.    Cardiovascular:  Negative for leg swelling.   Gastrointestinal:  Negative for abdominal pain and vomiting.   Genitourinary:  Positive for flank pain and urgency. Negative for dysuria and hematuria.   Musculoskeletal:  Positive for arthralgias and back pain.   Skin:  Negative for wound.   Neurological:  Negative for light-headedness.   Psychiatric/Behavioral:  The patient is nervous/anxious.       Objective:     Physical Exam  Vitals and nursing note reviewed.   Constitutional:       General: She is not in acute distress.     Appearance: She is well-developed. She is not diaphoretic.   HENT:      Head: Normocephalic.   Eyes:      Conjunctiva/sclera: Conjunctivae normal.   Cardiovascular:      Rate and Rhythm: Normal rate.   Pulmonary:      Effort: Pulmonary effort is normal. No respiratory distress.      Breath sounds: No wheezing.   Abdominal:      General: There is no distension.      Palpations: Abdomen is soft. There is no mass.      Tenderness: There is no abdominal tenderness. There is no rebound.      Hernia: No hernia is present.   Musculoskeletal:         General: No tenderness or deformity.   Skin:     General: Skin is warm.      Findings: No erythema or rash.   Neurological:      Mental Status: She is alert and oriented to person, place, and time.      Motor: No abnormal muscle tone.   Psychiatric:         Attention and Perception: Attention normal.         Mood and Affect: Affect normal.         Speech: Speech normal.          Behavior: Behavior normal.         Thought Content: Thought content normal.         Cognition and Memory: Cognition and memory normal.         Judgment: Judgment normal.            CMP  Sodium   Date Value Ref Range Status   04/04/2025 141 136 - 145 mmol/L Final     Potassium   Date Value Ref Range Status   04/04/2025 3.3 (L) 3.5 - 5.1 mmol/L Final     Chloride   Date Value Ref Range Status   04/04/2025 106 98 - 107 mmol/L Final     CO2   Date Value Ref Range Status   04/04/2025 24 23 - 31 mmol/L Final     Glucose   Date Value Ref Range Status   04/04/2025 131 (H) 82 - 115 mg/dL Final     BUN   Date Value Ref Range Status   04/04/2025 9 (L) 10 - 20 mg/dL Final     Creatinine   Date Value Ref Range Status   04/04/2025 1.21 (H) 0.55 - 1.02 mg/dL Final     Calcium   Date Value Ref Range Status   04/04/2025 9.3 8.4 - 10.2 mg/dL Final     Total Protein   Date Value Ref Range Status   03/07/2025 8.2 (H) 5.8 - 7.6 g/dL Final     Albumin   Date Value Ref Range Status   03/07/2025 2.8 (L) 3.4 - 4.8 g/dL Final     Bilirubin, Total   Date Value Ref Range Status   03/07/2025 0.5 <=1.5 mg/dL Final     Alk Phos   Date Value Ref Range Status   03/07/2025 70 40 - 150 U/L Final     AST   Date Value Ref Range Status   03/07/2025 54 (H) 5 - 34 U/L Final     ALT   Date Value Ref Range Status   03/07/2025 37 <=55 U/L Final     Anion Gap   Date Value Ref Range Status   04/04/2025 14 7 - 16 mmol/L Final     eGFR   Date Value Ref Range Status   04/04/2025 49 (L) >=60 mL/min/1.73m2 Final     Comment:     Estimated GFR calculated using the CKD-EPI creatinine (2021) equation.      BMP  Lab Results   Component Value Date     04/04/2025    K 3.3 (L) 04/04/2025     04/04/2025    CO2 24 04/04/2025    BUN 9 (L) 04/04/2025    CREATININE 1.21 (H) 04/04/2025    CALCIUM 9.3 04/04/2025    ANIONGAP 14 04/04/2025    EGFRNORACEVR 49 (L) 04/04/2025              [1]   Current Outpatient Medications on File Prior to Visit   Medication Sig  Dispense Refill    aspirin (ECOTRIN) 81 MG EC tablet Take 81 mg by mouth once daily.      atorvastatin (LIPITOR) 40 MG tablet Take 40 mg by mouth once daily.      carvediloL (COREG) 25 MG tablet Take 25 mg by mouth 2 (two) times daily.      EUTHYROX 112 mcg tablet Take 112 mcg by mouth before breakfast.      irbesartan (AVAPRO) 300 MG tablet Take 300 mg by mouth.      amLODIPine (NORVASC) 10 MG tablet Take 1 tablet (10 mg total) by mouth once daily. 30 tablet 0     No current facility-administered medications on file prior to visit.

## 2025-04-28 NOTE — H&P (VIEW-ONLY)
Assessment:   1. Hydronephrosis with urinary obstruction due to ureteral calculus  -     Case Request Operating Room: CYSTOURETEROSCOPY, WITH HOLMIUM LASER LITHOTRIPSY OF URETERAL CALCULUS AND STENT INSERTION  -     EKG 12-lead; Future    2. Left ureteral stone  -     Case Request Operating Room: CYSTOURETEROSCOPY, WITH HOLMIUM LASER LITHOTRIPSY OF URETERAL CALCULUS AND STENT INSERTION    3. Left renal stone  -     Case Request Operating Room: CYSTOURETEROSCOPY, WITH HOLMIUM LASER LITHOTRIPSY OF URETERAL CALCULUS AND STENT INSERTION  -     X-Ray KUB; Future; Expected date: 04/28/2025    4. Obstructive pyelonephritis  -     Case Request Operating Room: CYSTOURETEROSCOPY, WITH HOLMIUM LASER LITHOTRIPSY OF URETERAL CALCULUS AND STENT INSERTION  -     Basic Metabolic Panel; Future; Expected date: 04/28/2025    5. Status post placement of ureteral stent    6. Pseudomonas aeruginosa infection  -     CBC Auto Differential; Future; Expected date: 04/28/2025  -     ciprofloxacin HCl (CIPRO) 500 MG tablet; Take 1.5 tablets (750 mg total) by mouth every 12 (twelve) hours. for 7 days  Dispense: 21 tablet; Refill: 0    7. Urinary tract infection due to Proteus  -     CBC Auto Differential; Future; Expected date: 04/28/2025  -     ciprofloxacin HCl (CIPRO) 500 MG tablet; Take 1.5 tablets (750 mg total) by mouth every 12 (twelve) hours. for 7 days  Dispense: 21 tablet; Refill: 0         Plan:       I have started her on ciprofloxacin after completion of her previous course of antibiotics and discussed my concern for an occluded ureteral stent with the persistent infection and recommended intervention with cystoscopy possible ureteroscopy with laser lithotripsy but more than likely ureteral stent exchange.  We discussed the presentation of the large stone in the history of Proteus and Pseudomonas which is concerning for active stone growth from bacterial infection and the possibility and need for multiple staged procedures for  eventual clearance of the collecting system.  We discussed that right now the goal is to promote clearance of the infection completely prior to performing laser lithotripsy electively given risk of septicemia.     Plan:  KUB today  12 lead EKG today  BMP and CBC today  Start ciprofloxacin today  Request time space available for cystoscopy possible ureteral stent exchange possible ureteroscopy with lithotripsy on Thursday          Tl Arellano MD  Urology  2025          UROLOGY HISTORY AND PHYSICAL EXAM    Subjective:      Patient ID: Isaiah Neil is a 69 y.o. female.    Chief Complaint::   HPI    The patient is a 69-year-old female that presented with obstructive pyelonephritis and a large stone in the proximal left ureter as well as an additional stone in the distal left ureter and underwent ureteral stent decompression and treatment with antibiotics.  Postprocedure she was found to have evidence of Proteus and Pseudomonas and recently completed a course of antibiotics and presents today for follow-up.  She reports that she is not having gross hematuria or dysuria and denies any fever.  She reports occasional urge incontinence and flank pain.       Past Medical History:   Diagnosis Date    Digestive disorder     Hypertension     Mixed hyperlipidemia     Stroke     Thyroid disease      Past Surgical History:   Procedure Laterality Date     SECTION      CYSTOURETEROSCOPY, WITH HOLMIUM LASER LITHOTRIPSY OF URETERAL CALCULUS AND STENT INSERTION Left 3/9/2025    Procedure: CYSTOURETEROSCOPY, WITH HOLMIUM LASER LITHOTRIPSY OF URETERAL CALCULUS AND STENT INSERTION;  Surgeon: Tl Arellano MD;  Location: [facility] OR;  Service: Urology;  Laterality: Left;    HYSTERECTOMY      SHOULDER ARTHROSCOPY Right 5/3/2022    Procedure: ARTHROSCOPY, SHOULDER;  Surgeon: Davis Dunn MD;  Location: [facility] OR;  Service: Orthopedics;  Laterality: Right;        Medications Ordered Prior to  Encounter[1]     Review of patient's allergies indicates:   Allergen Reactions    Azithromycin Nausea Only    Codeine phosphate Nausea Only    Lortab [hydrocodone-acetaminophen] Nausea Only    Sulfa (sulfonamide antibiotics) Nausea Only     Vitals:    04/28/25 0857   BP: (!) 186/87   Pulse:    Resp:    Temp:           Review of Systems   Constitutional:  Negative for chills and fever.   Eyes:  Negative for visual disturbance.   Respiratory:  Negative for shortness of breath.    Cardiovascular:  Negative for leg swelling.   Gastrointestinal:  Negative for abdominal pain and vomiting.   Genitourinary:  Positive for flank pain and urgency. Negative for dysuria and hematuria.   Musculoskeletal:  Positive for arthralgias and back pain.   Skin:  Negative for wound.   Neurological:  Negative for light-headedness.   Psychiatric/Behavioral:  The patient is nervous/anxious.       Objective:     Physical Exam  Vitals and nursing note reviewed.   Constitutional:       General: She is not in acute distress.     Appearance: She is well-developed. She is not diaphoretic.   HENT:      Head: Normocephalic.   Eyes:      Conjunctiva/sclera: Conjunctivae normal.   Cardiovascular:      Rate and Rhythm: Normal rate.   Pulmonary:      Effort: Pulmonary effort is normal. No respiratory distress.      Breath sounds: No wheezing.   Abdominal:      General: There is no distension.      Palpations: Abdomen is soft. There is no mass.      Tenderness: There is no abdominal tenderness. There is no rebound.      Hernia: No hernia is present.   Musculoskeletal:         General: No tenderness or deformity.   Skin:     General: Skin is warm.      Findings: No erythema or rash.   Neurological:      Mental Status: She is alert and oriented to person, place, and time.      Motor: No abnormal muscle tone.   Psychiatric:         Attention and Perception: Attention normal.         Mood and Affect: Affect normal.         Speech: Speech normal.          Behavior: Behavior normal.         Thought Content: Thought content normal.         Cognition and Memory: Cognition and memory normal.         Judgment: Judgment normal.            CMP  Sodium   Date Value Ref Range Status   04/04/2025 141 136 - 145 mmol/L Final     Potassium   Date Value Ref Range Status   04/04/2025 3.3 (L) 3.5 - 5.1 mmol/L Final     Chloride   Date Value Ref Range Status   04/04/2025 106 98 - 107 mmol/L Final     CO2   Date Value Ref Range Status   04/04/2025 24 23 - 31 mmol/L Final     Glucose   Date Value Ref Range Status   04/04/2025 131 (H) 82 - 115 mg/dL Final     BUN   Date Value Ref Range Status   04/04/2025 9 (L) 10 - 20 mg/dL Final     Creatinine   Date Value Ref Range Status   04/04/2025 1.21 (H) 0.55 - 1.02 mg/dL Final     Calcium   Date Value Ref Range Status   04/04/2025 9.3 8.4 - 10.2 mg/dL Final     Total Protein   Date Value Ref Range Status   03/07/2025 8.2 (H) 5.8 - 7.6 g/dL Final     Albumin   Date Value Ref Range Status   03/07/2025 2.8 (L) 3.4 - 4.8 g/dL Final     Bilirubin, Total   Date Value Ref Range Status   03/07/2025 0.5 <=1.5 mg/dL Final     Alk Phos   Date Value Ref Range Status   03/07/2025 70 40 - 150 U/L Final     AST   Date Value Ref Range Status   03/07/2025 54 (H) 5 - 34 U/L Final     ALT   Date Value Ref Range Status   03/07/2025 37 <=55 U/L Final     Anion Gap   Date Value Ref Range Status   04/04/2025 14 7 - 16 mmol/L Final     eGFR   Date Value Ref Range Status   04/04/2025 49 (L) >=60 mL/min/1.73m2 Final     Comment:     Estimated GFR calculated using the CKD-EPI creatinine (2021) equation.      BMP  Lab Results   Component Value Date     04/04/2025    K 3.3 (L) 04/04/2025     04/04/2025    CO2 24 04/04/2025    BUN 9 (L) 04/04/2025    CREATININE 1.21 (H) 04/04/2025    CALCIUM 9.3 04/04/2025    ANIONGAP 14 04/04/2025    EGFRNORACEVR 49 (L) 04/04/2025              [1]   Current Outpatient Medications on File Prior to Visit   Medication Sig  Dispense Refill    aspirin (ECOTRIN) 81 MG EC tablet Take 81 mg by mouth once daily.      atorvastatin (LIPITOR) 40 MG tablet Take 40 mg by mouth once daily.      carvediloL (COREG) 25 MG tablet Take 25 mg by mouth 2 (two) times daily.      EUTHYROX 112 mcg tablet Take 112 mcg by mouth before breakfast.      irbesartan (AVAPRO) 300 MG tablet Take 300 mg by mouth.      amLODIPine (NORVASC) 10 MG tablet Take 1 tablet (10 mg total) by mouth once daily. 30 tablet 0     No current facility-administered medications on file prior to visit.

## 2025-04-30 LAB — UA COMPLETE W REFLEX CULTURE PNL UR: NORMAL

## 2025-05-01 ENCOUNTER — HOSPITAL ENCOUNTER (OUTPATIENT)
Facility: HOSPITAL | Age: 70
Discharge: HOME | End: 2025-05-01
Attending: UROLOGY | Admitting: UROLOGY
Payer: COMMERCIAL

## 2025-05-01 ENCOUNTER — ANESTHESIA (OUTPATIENT)
Dept: SURGERY | Facility: HOSPITAL | Age: 70
End: 2025-05-01
Payer: COMMERCIAL

## 2025-05-01 ENCOUNTER — ANESTHESIA EVENT (OUTPATIENT)
Dept: SURGERY | Facility: HOSPITAL | Age: 70
End: 2025-05-01
Payer: COMMERCIAL

## 2025-05-01 VITALS
RESPIRATION RATE: 16 BRPM | SYSTOLIC BLOOD PRESSURE: 175 MMHG | BODY MASS INDEX: 36.82 KG/M2 | HEIGHT: 61 IN | DIASTOLIC BLOOD PRESSURE: 64 MMHG | OXYGEN SATURATION: 98 % | HEART RATE: 46 BPM | WEIGHT: 195 LBS | TEMPERATURE: 97 F

## 2025-05-01 DIAGNOSIS — N20.0 LEFT RENAL STONE: Primary | ICD-10-CM

## 2025-05-01 DIAGNOSIS — N13.2 HYDRONEPHROSIS WITH URINARY OBSTRUCTION DUE TO URETERAL CALCULUS: ICD-10-CM

## 2025-05-01 PROCEDURE — 63600175 PHARM REV CODE 636 W HCPCS: Performed by: NURSE ANESTHETIST, CERTIFIED REGISTERED

## 2025-05-01 PROCEDURE — 27000689 HC BLADE LARYNGOSCOPE ANY SIZE: Performed by: ANESTHESIOLOGY

## 2025-05-01 PROCEDURE — C2617 STENT, NON-COR, TEM W/O DEL: HCPCS | Performed by: UROLOGY

## 2025-05-01 PROCEDURE — 25000003 PHARM REV CODE 250: Performed by: NURSE ANESTHETIST, CERTIFIED REGISTERED

## 2025-05-01 PROCEDURE — 37000009 HC ANESTHESIA EA ADD 15 MINS: Performed by: UROLOGY

## 2025-05-01 PROCEDURE — 52356 CYSTO/URETERO W/LITHOTRIPSY: CPT | Mod: LT,,, | Performed by: UROLOGY

## 2025-05-01 PROCEDURE — 27000655: Performed by: ANESTHESIOLOGY

## 2025-05-01 PROCEDURE — 71000016 HC POSTOP RECOV ADDL HR: Performed by: UROLOGY

## 2025-05-01 PROCEDURE — C1894 INTRO/SHEATH, NON-LASER: HCPCS | Performed by: UROLOGY

## 2025-05-01 PROCEDURE — C1758 CATHETER, URETERAL: HCPCS | Performed by: UROLOGY

## 2025-05-01 PROCEDURE — 27201423 OPTIME MED/SURG SUP & DEVICES STERILE SUPPLY: Performed by: UROLOGY

## 2025-05-01 PROCEDURE — 25500020 PHARM REV CODE 255: Performed by: UROLOGY

## 2025-05-01 PROCEDURE — 25000003 PHARM REV CODE 250: Performed by: UROLOGY

## 2025-05-01 PROCEDURE — C1769 GUIDE WIRE: HCPCS | Performed by: UROLOGY

## 2025-05-01 PROCEDURE — 74420 UROGRAPHY RTRGR +-KUB: CPT | Mod: 26,,, | Performed by: UROLOGY

## 2025-05-01 PROCEDURE — 71000033 HC RECOVERY, INTIAL HOUR: Performed by: UROLOGY

## 2025-05-01 PROCEDURE — 27000165 HC TUBE, ETT CUFFED: Performed by: ANESTHESIOLOGY

## 2025-05-01 PROCEDURE — 63600175 PHARM REV CODE 636 W HCPCS: Performed by: UROLOGY

## 2025-05-01 PROCEDURE — 36000706: Performed by: UROLOGY

## 2025-05-01 PROCEDURE — 71000015 HC POSTOP RECOV 1ST HR: Performed by: UROLOGY

## 2025-05-01 PROCEDURE — 37000008 HC ANESTHESIA 1ST 15 MINUTES: Performed by: UROLOGY

## 2025-05-01 PROCEDURE — 27000716 HC OXISENSOR PROBE, ANY SIZE: Performed by: ANESTHESIOLOGY

## 2025-05-01 PROCEDURE — C1747 OPTIME ENDOSCOPE, SINGLE, URINARY TRACT: HCPCS | Performed by: UROLOGY

## 2025-05-01 PROCEDURE — 36000707: Performed by: UROLOGY

## 2025-05-01 DEVICE — STENT UNIVERSA SFT 8F 22-32CM: Type: IMPLANTABLE DEVICE | Site: URETER | Status: FUNCTIONAL

## 2025-05-01 RX ORDER — SODIUM CHLORIDE 9 MG/ML
INJECTION, SOLUTION INTRAVENOUS CONTINUOUS
Status: DISCONTINUED | OUTPATIENT
Start: 2025-05-01 | End: 2025-05-01 | Stop reason: HOSPADM

## 2025-05-01 RX ORDER — ACETAMINOPHEN 500 MG
1000 TABLET ORAL ONCE
Status: SHIPPED | OUTPATIENT
Start: 2025-05-01

## 2025-05-01 RX ORDER — CEFTRIAXONE 1 G/1
1 INJECTION, POWDER, FOR SOLUTION INTRAMUSCULAR; INTRAVENOUS ONCE
Status: COMPLETED | OUTPATIENT
Start: 2025-05-01 | End: 2025-05-01

## 2025-05-01 RX ORDER — EPHEDRINE SULFATE 50 MG/ML
INJECTION, SOLUTION INTRAVENOUS
Status: DISCONTINUED | OUTPATIENT
Start: 2025-05-01 | End: 2025-05-01

## 2025-05-01 RX ORDER — MIDAZOLAM HYDROCHLORIDE 1 MG/ML
INJECTION INTRAMUSCULAR; INTRAVENOUS
Status: DISCONTINUED | OUTPATIENT
Start: 2025-05-01 | End: 2025-05-01

## 2025-05-01 RX ORDER — ONDANSETRON HYDROCHLORIDE 2 MG/ML
INJECTION, SOLUTION INTRAVENOUS
Status: DISCONTINUED | OUTPATIENT
Start: 2025-05-01 | End: 2025-05-01

## 2025-05-01 RX ORDER — GLUCAGON 1 MG
1 KIT INJECTION
Status: DISCONTINUED | OUTPATIENT
Start: 2025-05-01 | End: 2025-05-01 | Stop reason: HOSPADM

## 2025-05-01 RX ORDER — PROPOFOL 10 MG/ML
VIAL (ML) INTRAVENOUS
Status: DISCONTINUED | OUTPATIENT
Start: 2025-05-01 | End: 2025-05-01

## 2025-05-01 RX ORDER — ROCURONIUM BROMIDE 10 MG/ML
INJECTION, SOLUTION INTRAVENOUS
Status: DISCONTINUED | OUTPATIENT
Start: 2025-05-01 | End: 2025-05-01

## 2025-05-01 RX ORDER — VECURONIUM BROMIDE 1 MG/ML
INJECTION, POWDER, LYOPHILIZED, FOR SOLUTION INTRAVENOUS
Status: DISCONTINUED | OUTPATIENT
Start: 2025-05-01 | End: 2025-05-01

## 2025-05-01 RX ORDER — ACETAMINOPHEN 500 MG
1000 TABLET ORAL ONCE
Status: COMPLETED | OUTPATIENT
Start: 2025-05-01 | End: 2025-05-01

## 2025-05-01 RX ORDER — FENTANYL CITRATE 50 UG/ML
INJECTION, SOLUTION INTRAMUSCULAR; INTRAVENOUS
Status: DISCONTINUED | OUTPATIENT
Start: 2025-05-01 | End: 2025-05-01

## 2025-05-01 RX ORDER — DEXAMETHASONE SODIUM PHOSPHATE 4 MG/ML
INJECTION, SOLUTION INTRA-ARTICULAR; INTRALESIONAL; INTRAMUSCULAR; INTRAVENOUS; SOFT TISSUE
Status: DISCONTINUED | OUTPATIENT
Start: 2025-05-01 | End: 2025-05-01

## 2025-05-01 RX ORDER — LIDOCAINE HYDROCHLORIDE 20 MG/ML
JELLY TOPICAL
Status: DISCONTINUED | OUTPATIENT
Start: 2025-05-01 | End: 2025-05-01 | Stop reason: HOSPADM

## 2025-05-01 RX ORDER — HYDROMORPHONE HYDROCHLORIDE 2 MG/ML
0.5 INJECTION, SOLUTION INTRAMUSCULAR; INTRAVENOUS; SUBCUTANEOUS EVERY 5 MIN PRN
Status: DISCONTINUED | OUTPATIENT
Start: 2025-05-01 | End: 2025-05-01 | Stop reason: HOSPADM

## 2025-05-01 RX ORDER — HYDROCODONE BITARTRATE AND ACETAMINOPHEN 5; 325 MG/1; MG/1
1 TABLET ORAL EVERY 6 HOURS PRN
Qty: 28 TABLET | Refills: 0 | Status: SHIPPED | OUTPATIENT
Start: 2025-05-01 | End: 2025-05-08

## 2025-05-01 RX ORDER — FUROSEMIDE 10 MG/ML
INJECTION INTRAMUSCULAR; INTRAVENOUS
Status: DISCONTINUED | OUTPATIENT
Start: 2025-05-01 | End: 2025-05-01

## 2025-05-01 RX ORDER — IOPAMIDOL 612 MG/ML
INJECTION, SOLUTION INTRAVASCULAR
Status: DISCONTINUED | OUTPATIENT
Start: 2025-05-01 | End: 2025-05-01 | Stop reason: HOSPADM

## 2025-05-01 RX ORDER — PHENYLEPHRINE HYDROCHLORIDE 10 MG/ML
INJECTION INTRAVENOUS
Status: DISCONTINUED | OUTPATIENT
Start: 2025-05-01 | End: 2025-05-01

## 2025-05-01 RX ORDER — TOLTERODINE 4 MG/1
CAPSULE, EXTENDED RELEASE ORAL
Qty: 30 CAPSULE | Refills: 1 | Status: SHIPPED | OUTPATIENT
Start: 2025-05-01

## 2025-05-01 RX ORDER — ONDANSETRON HYDROCHLORIDE 2 MG/ML
4 INJECTION, SOLUTION INTRAVENOUS DAILY PRN
Status: DISCONTINUED | OUTPATIENT
Start: 2025-05-01 | End: 2025-05-01 | Stop reason: HOSPADM

## 2025-05-01 RX ORDER — MORPHINE SULFATE 10 MG/ML
4 INJECTION INTRAMUSCULAR; INTRAVENOUS; SUBCUTANEOUS EVERY 5 MIN PRN
Status: DISCONTINUED | OUTPATIENT
Start: 2025-05-01 | End: 2025-05-01 | Stop reason: HOSPADM

## 2025-05-01 RX ORDER — MEPERIDINE HYDROCHLORIDE 25 MG/ML
25 INJECTION INTRAMUSCULAR; INTRAVENOUS; SUBCUTANEOUS EVERY 10 MIN PRN
Status: DISCONTINUED | OUTPATIENT
Start: 2025-05-01 | End: 2025-05-01 | Stop reason: HOSPADM

## 2025-05-01 RX ORDER — HYDROMORPHONE HYDROCHLORIDE 2 MG/ML
INJECTION, SOLUTION INTRAMUSCULAR; INTRAVENOUS; SUBCUTANEOUS
Status: DISCONTINUED | OUTPATIENT
Start: 2025-05-01 | End: 2025-05-01

## 2025-05-01 RX ORDER — LIDOCAINE HYDROCHLORIDE 20 MG/ML
INJECTION, SOLUTION EPIDURAL; INFILTRATION; INTRACAUDAL; PERINEURAL
Status: DISCONTINUED | OUTPATIENT
Start: 2025-05-01 | End: 2025-05-01

## 2025-05-01 RX ORDER — DIPHENHYDRAMINE HYDROCHLORIDE 50 MG/ML
25 INJECTION, SOLUTION INTRAMUSCULAR; INTRAVENOUS EVERY 6 HOURS PRN
Status: DISCONTINUED | OUTPATIENT
Start: 2025-05-01 | End: 2025-05-01 | Stop reason: HOSPADM

## 2025-05-01 RX ADMIN — ONDANSETRON 4 MG: 2 INJECTION INTRAMUSCULAR; INTRAVENOUS at 07:05

## 2025-05-01 RX ADMIN — ROCURONIUM BROMIDE 50 MG: 10 INJECTION, SOLUTION INTRAVENOUS at 07:05

## 2025-05-01 RX ADMIN — SODIUM CHLORIDE: 9 INJECTION, SOLUTION INTRAVENOUS at 06:05

## 2025-05-01 RX ADMIN — VECURONIUM BROMIDE 2 MG: 1 INJECTION, POWDER, LYOPHILIZED, FOR SOLUTION INTRAVENOUS at 08:05

## 2025-05-01 RX ADMIN — HYDROMORPHONE HYDROCHLORIDE 2 MG: 2 INJECTION, SOLUTION INTRAMUSCULAR; INTRAVENOUS; SUBCUTANEOUS at 08:05

## 2025-05-01 RX ADMIN — SUGAMMADEX 200 MG: 100 INJECTION, SOLUTION INTRAVENOUS at 09:05

## 2025-05-01 RX ADMIN — LIDOCAINE HYDROCHLORIDE 100 MG: 20 INJECTION, SOLUTION EPIDURAL; INFILTRATION; INTRACAUDAL; PERINEURAL at 07:05

## 2025-05-01 RX ADMIN — FENTANYL CITRATE 100 MCG: 50 INJECTION, SOLUTION INTRAMUSCULAR; INTRAVENOUS at 07:05

## 2025-05-01 RX ADMIN — FUROSEMIDE 10 MG: 10 INJECTION, SOLUTION INTRAMUSCULAR; INTRAVENOUS at 08:05

## 2025-05-01 RX ADMIN — PROPOFOL 100 MG: 10 INJECTION, EMULSION INTRAVENOUS at 07:05

## 2025-05-01 RX ADMIN — PHENYLEPHRINE HYDROCHLORIDE 100 MCG: 10 INJECTION INTRAVENOUS at 07:05

## 2025-05-01 RX ADMIN — DEXAMETHASONE SODIUM PHOSPHATE 8 MG: 4 INJECTION, SOLUTION INTRA-ARTICULAR; INTRALESIONAL; INTRAMUSCULAR; INTRAVENOUS; SOFT TISSUE at 07:05

## 2025-05-01 RX ADMIN — ACETAMINOPHEN 1000 MG: 500 TABLET ORAL at 11:05

## 2025-05-01 RX ADMIN — SODIUM CHLORIDE: 9 INJECTION, SOLUTION INTRAVENOUS at 07:05

## 2025-05-01 RX ADMIN — EPHEDRINE SULFATE 25 MG: 50 INJECTION INTRAVENOUS at 07:05

## 2025-05-01 RX ADMIN — CEFTRIAXONE SODIUM 1 G: 1 INJECTION, POWDER, FOR SOLUTION INTRAMUSCULAR; INTRAVENOUS at 07:05

## 2025-05-01 RX ADMIN — MIDAZOLAM HYDROCHLORIDE 2 MG: 1 INJECTION, SOLUTION INTRAMUSCULAR; INTRAVENOUS at 07:05

## 2025-05-01 NOTE — ANESTHESIA PREPROCEDURE EVALUATION
05/01/2025  Isaiah Neil is a 69 y.o., female.      Pre-op Assessment    I have reviewed the Patient Summary Reports.    I have reviewed the NPO Status.   I have reviewed the Medications.     Review of Systems         Anesthesia Plan  Type of Anesthesia, risks & benefits discussed:    Anesthesia Type: Gen ETT  Intra-op Monitoring Plan: Standard ASA Monitors  Post Op Pain Control Plan: IV/PO Opioids PRN  Induction:  IV  Informed Consent: Informed consent signed with the Patient and all parties understand the risks and agree with anesthesia plan.  All questions answered.   ASA Score: 3    Ready For Surgery From Anesthesia Perspective.     .  NPO greater than 8 hours  No anesthetic complications           Allergen Types Reaction Severity Reaction Type Noted           Allergies        Azithromycin Drug Ingredient Nausea Only Low  5/3/2022          Codeine Phosphate Drug Ingredient Nausea Only Low  9/2/2021          Lortab [Hydrocodone-acetaminophen] Drug Nausea Only Low  5/3/2022          Sulfa (Sulfonamide Antibiotics)          HTN  H/o ocular stroke  Borderline diabetes mellitus  Sleep apnea    MP II; adequate ROM at neck    Known to tolerate tylenol

## 2025-05-01 NOTE — OR NURSING
0926 Admit to pacu. Lines and monitors connected. Vss afebrile.O2 sats 95% 6l sm. L uteral stent report. Report from MOISES Arguelles crna.    0924 Family updated via text    1009 Upon transfer to Asc 4 pt alert and awake. Vss. Pt denies pain,sob,nausea at this time. Report given to Stephanie gant. Family at bedside  92% room air 2l nc applied  65  18  194/73

## 2025-05-01 NOTE — OP NOTE
DATE OF OPERATION: 5/1/2025     PREOPERATIVE DIAGNOSIS: Left Proximal Ureteral calculus; Left Nephrolithiasis      POSTOPERATIVE DIAGNOSIS: Same as above      OPERATIONS PERFORMED:  Left UreterRenoscopy with holmium laser lithotripsy   Retrograde Pyelography performance, supervision and interpretation  Placement of double-J stent 8fr Cook Soft      SURGEON: Tl Arellano     ASSISTANT: John Dougherty     EBL: Trace      SPECIMENS: Stone fragments      ANESTHESIA: General       DESCRIPTION OF OPERATION: The patient was brought to the operating room and underwent induction of general anesthesia without complications. She was positioned in the cystolithotomy position and prepped and draped. Cystoscopy was performed. The bladder mucosa was normal. The left ureteral stent was grasped and pulled out through the urethra. A wire was advanced and the stent was removed. A 10fr dual lumen ureteral catheter was advanced. Retrograde ureterography was performed. There were no stones in the ureter. There were filling defects in the upper pole, midpole and lower pole. A 2nd wire was placed. Under fluoroscopic guidance an 11/13 ureteral access sheath was placed. Flexible ureteroscopy was then performed. The ureteroscope was advanced to the renal pelvis. There were soft stone stones in the renal pelvis and the calyces. Using thulium laser lithotripsy the stones were pulverized. The stones were soft. The fragments were removed with the basket. Renoscopy was performed and the calyces were irrigated. All stone fragments were removed. The ureteroscope was then removed and upon withdrawal the ureter was inspected. There were no stone fragments left in the ureter. The wire was converted into a Cook Soft 8fr double-J ureteral stent 22cm/32cm in length. The stent was seen curled in good position in the renal pelvis on fluoroscopy and in the bladder on cystoscopy. The bladder was drained. 10ml of 2% viscous lidocaine was instilled per  urethra to help with comfort post procedure. The patient was awakened and brought to the PACU in satisfactory condition. She tolerated the procedure well. Plan to maintain the stent in place for 2 weeks and have her return to the clinic for stent removal in the office on Thursday May 15th.      Complications: None

## 2025-05-01 NOTE — ANESTHESIA POSTPROCEDURE EVALUATION
Anesthesia Post Evaluation    Patient: Isaiah Neil    Procedure(s) Performed: Procedure(s) (LRB):  CYSTOURETEROSCOPY, WITH HOLMIUM LASER LITHOTRIPSY OF URETERAL CALCULUS AND STENT INSERTION (Left)    Final Anesthesia Type: general      Patient location during evaluation: PACU  Post-procedure vital signs: reviewed and stable  Pain management: adequate  Airway patency: patent    PONV status at discharge: No PONV  Anesthetic complications: no      Cardiovascular status: hemodynamically stable  Respiratory status: unassisted  Hydration status: euvolemic  Follow-up not needed.              Vitals Value Taken Time   /69 05/01/25 10:46   Temp 36.1 °C (97 °F) 05/01/25 09:30   Pulse 52 05/01/25 10:52   Resp 16 05/01/25 10:30   SpO2 96 % 05/01/25 10:52   Vitals shown include unfiled device data.      Event Time   Out of Recovery 10:00:00         Pain/Tasia Score: Tasia Score: 9 (5/1/2025 10:06 AM)

## 2025-05-01 NOTE — TRANSFER OF CARE
"Anesthesia Transfer of Care Note    Patient: Isaiah Neil    Procedure(s) Performed: Procedure(s) (LRB):  CYSTOURETEROSCOPY, WITH HOLMIUM LASER LITHOTRIPSY OF URETERAL CALCULUS AND STENT INSERTION (Left)    Patient location: PACU    Anesthesia Type: general    Transport from OR: Transported from OR on 100% O2 by closed face mask with adequate spontaneous ventilation    Post pain: adequate analgesia    Post assessment: no apparent anesthetic complications    Post vital signs: stable    Level of consciousness: awake, alert and oriented    Nausea/Vomiting: no nausea/vomiting    Complications: none    Transfer of care protocol was followed      Last vitals: Visit Vitals  BP (!) 171/69 (BP Location: Right arm, Patient Position: Lying)   Pulse 61   Temp 36.1 °C (97 °F) (Oral)   Resp 19   Ht 5' 1" (1.549 m)   Wt 88.5 kg (195 lb)   SpO2 97%   Breastfeeding No   BMI 36.84 kg/m²     "

## 2025-05-01 NOTE — ANESTHESIA PROCEDURE NOTES
Intubation    Date/Time: 5/1/2025 7:35 AM    Performed by: Chandu Arguelles CRNA  Authorized by: Bryce Downey MD    Intubation:     Induction:  Intravenous    Intubated:  Postinduction    Mask Ventilation:  Easy mask    Attempts:  1    Attempted By:  CRNA    Method of Intubation:  Direct    Blade:  Nguyễn 2    Laryngeal View Grade: Grade IIA - cords partially seen      Difficult Airway Encountered?: No      Complications:  None    Airway Device:  Oral endotracheal tube    Airway Device Size:  7.0    Style/Cuff Inflation:  Cuffed    Inflation Amount (mL):  10    Tube secured:  22    Secured at:  The lips    Placement Verified By:  Capnometry    Complicating Factors:  None    Findings Post-Intubation:  BS equal bilateral

## 2025-05-01 NOTE — DISCHARGE SUMMARY
[facility]  Urology  Discharge Summary      Patient Name: Isaiah Neil  MRN: [id number]  Admission Date: 5/1/2025  Hospital Length of Stay: 0 days  Discharge Date and Time: 05/01/2025 9:31 AM  Attending Physician: Tl Keller MD   Discharging Provider: TL KELLER MD  Primary Care Physician: Juan Jose Singh MD    HPI:   The patient is a 69-year-old female that initially presented with acute kidney injury and a large stone in the left proximal ureter.  She underwent ureteral stent decompression and presented today for definitive treatment of the ureteral stone and renal stones.  She completed treatment with antibiotics    Procedure(s) (LRB):  CYSTOURETEROSCOPY, WITH HOLMIUM LASER LITHOTRIPSY OF URETERAL CALCULUS AND STENT INSERTION (Left)     Indwelling Lines/Drains at time of discharge:   Lines/Drains/Airways       None                   Hospital Course:  The patient underwent ureteroscopy and renoscopy with laser lithotripsy and basket removal of stone fragments.  A ureteral stent was placed and she was discharged to home to continue her course of antibiotics.       Goals of Care Treatment Preferences:  Code Status: Full Code      Consults:     Significant Diagnostic Studies:  Renoscopy    Pending Diagnostic Studies:       Procedure Component Value Units Date/Time    FL Limited Non-Billable [[id number]]     Order Status: Sent Lab Status: No result             Final Active Diagnoses:      Problems Resolved During this Admission:    Diagnosis Date Noted Date Resolved POA    PRINCIPAL PROBLEM:  Left renal stone [N20.0] 05/01/2025 05/01/2025 Yes         Discharged Condition: good    Disposition: Home or Self Care    Follow Up:  Thursday May 15th for stent removal    Patient Instructions:  Stay active and well hydrated.  Drink 2-3 L of water per day.     Medications:  Reconciled Home Medications:      Medication List        START taking these medications       HYDROcodone-acetaminophen 5-325 mg per tablet  Commonly known as: NORCO  Take 1 tablet by mouth every 6 (six) hours as needed for Pain.     tolterodine 4 MG 24 hr capsule  Commonly known as: DETROL LA  Take 1 tablet every day to help with stent related urgency and frequency.            CONTINUE taking these medications      amLODIPine 10 MG tablet  Commonly known as: NORVASC  Take 1 tablet (10 mg total) by mouth once daily.     aspirin 81 MG EC tablet  Commonly known as: ECOTRIN  Take 81 mg by mouth once daily.     atorvastatin 40 MG tablet  Commonly known as: LIPITOR  Take 40 mg by mouth once daily.     carvediloL 25 MG tablet  Commonly known as: COREG  Take 25 mg by mouth 2 (two) times daily.     ciprofloxacin HCl 500 MG tablet  Commonly known as: CIPRO  Take 1.5 tablets (750 mg total) by mouth every 12 (twelve) hours. for 7 days     EUTHYROX 112 mcg tablet  Generic drug: levothyroxine  Take 112 mcg by mouth before breakfast.     irbesartan 300 MG tablet  Commonly known as: AVAPRO  Take 300 mg by mouth.              Time spent on the discharge of patient: 15 minutes    ARETHA KELLER MD  Urology  [facility]

## 2025-05-02 ENCOUNTER — TELEPHONE (OUTPATIENT)
Dept: UROLOGY | Facility: CLINIC | Age: 70
End: 2025-05-02
Payer: COMMERCIAL

## 2025-05-02 NOTE — TELEPHONE ENCOUNTER
----- Message from Connie sent at 5/2/2025  8:20 AM CDT -----  Who Called: Isaiah Romero is requesting assistance/information from provider's office.Pt is wanting to speak w nurse about her return to work date Preferred Method of Contact: Phone CallPatient's Preferred Phone Number on File: [phone] Best Call Back Number, if different:Additional Information:  See the other phone encounter for today.

## 2025-05-02 NOTE — TELEPHONE ENCOUNTER
----- Message from Isabel sent at 5/2/2025  9:08 AM CDT -----  Regarding: pt is asking when would she be able to go back to work  Who Called: Shahideric CRISTINA Yungdarinel is requesting assistance/information from provider's office.Preferred Method of Contact: Phone CallPatient's Preferred Phone Number on File: [phone] Best Call Back Number, if different:Additional Information: pt is asking when would she be able to go back to work  I called pt and spoke with her.  She said she thinks her papers have 7/3 on them.  She had stones removed yesterday on 5/1/25 and she feels so much better today.  Said she is set for stent removal on 5/15/25 with Dr Arellano, and would like to go back to work on 5/20/25.  However, she needs to let her job, etc know by 5/6/25 what date she is looking at to return to work.  She is a dialysis nurse.  I told her I will relay this info to CORBY Jorge RN, who does paper work for Dr Arellano.  She voiced understanding.  She said she had left papers here already.

## 2025-05-15 ENCOUNTER — PROCEDURE VISIT (OUTPATIENT)
Dept: UROLOGY | Facility: CLINIC | Age: 70
End: 2025-05-15
Payer: MEDICARE

## 2025-05-15 VITALS
HEIGHT: 61 IN | TEMPERATURE: 97 F | WEIGHT: 197 LBS | SYSTOLIC BLOOD PRESSURE: 172 MMHG | HEART RATE: 68 BPM | BODY MASS INDEX: 37.19 KG/M2 | DIASTOLIC BLOOD PRESSURE: 95 MMHG

## 2025-05-15 DIAGNOSIS — Z96.0 STATUS POST PLACEMENT OF URETERAL STENT: ICD-10-CM

## 2025-05-15 DIAGNOSIS — N39.0 URINARY TRACT INFECTION WITHOUT HEMATURIA, SITE UNSPECIFIED: Primary | ICD-10-CM

## 2025-05-15 DIAGNOSIS — N20.1 LEFT URETERAL STONE: ICD-10-CM

## 2025-05-15 PROCEDURE — 52310 CYSTOSCOPY AND TREATMENT: CPT | Mod: PBBFAC | Performed by: UROLOGY

## 2025-05-15 RX ORDER — AMOXICILLIN AND CLAVULANATE POTASSIUM 875; 125 MG/1; MG/1
1 TABLET, FILM COATED ORAL
Status: COMPLETED | OUTPATIENT
Start: 2025-05-15 | End: 2025-05-15

## 2025-05-15 RX ORDER — LIDOCAINE HYDROCHLORIDE 20 MG/ML
JELLY TOPICAL
Status: COMPLETED | OUTPATIENT
Start: 2025-05-15 | End: 2025-05-15

## 2025-05-15 RX ADMIN — AMOXICILLIN AND CLAVULANATE POTASSIUM 1 TABLET: 875; 125 TABLET, FILM COATED ORAL at 01:05

## 2025-05-15 RX ADMIN — LIDOCAINE HYDROCHLORIDE 10 ML: 20 JELLY TOPICAL at 03:05

## 2025-05-15 NOTE — PROCEDURES
Office Cystoscopy Procedure Note    Date of Procedure: 05/15/2025    Indication:  Ureteral stent removal      Informed consent:  The risks, benefits, complications, treatment options, and expected outcomes were discussed with the patient. The patient concurred with the proposed plan and provided informed consent.     Anesthesia: Lidocaine jelly 2%          Procedure:  The patient was placed in the lithotomy position, was prepped and draped in the usual manner using sterile technique, and 2% lidocaine jelly instilled into the urethra.  A 17 F flexible cystoscope was then inserted into the urethra and the urethra and bladder carefully examined.  The following findings were noted:       Findings:   Urethra:  Normal    Bladder:  Normal    Ureteral orifices:       -Right: Normal       -Left: Stent in situ, removed intact     Other findings:     None        Specimens: None                   Complications: None; patient tolerated the procedure well            Disposition: Home after brief observation     Antibiotic prophylaxis: Augmentin    Condition: Stable     Plan:  Return in 4-5 weeks to discuss and coordinate metabolic testing.           Tl Arellano MD

## 2025-06-11 ENCOUNTER — OFFICE VISIT (OUTPATIENT)
Dept: UROLOGY | Facility: CLINIC | Age: 70
End: 2025-06-11
Payer: COMMERCIAL

## 2025-06-11 VITALS
HEIGHT: 61 IN | HEART RATE: 95 BPM | OXYGEN SATURATION: 97 % | DIASTOLIC BLOOD PRESSURE: 79 MMHG | WEIGHT: 195 LBS | BODY MASS INDEX: 36.82 KG/M2 | SYSTOLIC BLOOD PRESSURE: 162 MMHG

## 2025-06-11 DIAGNOSIS — N13.2 HYDRONEPHROSIS WITH URINARY OBSTRUCTION DUE TO URETERAL CALCULUS: Primary | ICD-10-CM

## 2025-06-11 DIAGNOSIS — N20.0 LEFT RENAL STONE: ICD-10-CM

## 2025-06-11 DIAGNOSIS — N11.1 OBSTRUCTIVE PYELONEPHRITIS: ICD-10-CM

## 2025-06-11 PROCEDURE — 99214 OFFICE O/P EST MOD 30 MIN: CPT | Mod: PBBFAC | Performed by: UROLOGY

## 2025-06-11 PROCEDURE — 99999 PR PBB SHADOW E&M-EST. PATIENT-LVL IV: CPT | Mod: PBBFAC,,, | Performed by: UROLOGY

## 2025-06-12 NOTE — PROGRESS NOTES
Assessment:   1. Hydronephrosis with urinary obstruction due to ureteral calculus  -     US Kidney; Future; Expected date: 2025    2. Left renal stone  -     US Kidney; Future; Expected date: 2025    3. Obstructive pyelonephritis  -     US Kidney; Future; Expected date: 2025         Plan:     We discussed plans to perform metabolic testing and have requested a 24 hour Litholink and will request a renal sonogram as a follow-up to stent removal.      Plan:  Perform 24 hour Litholink  Schedule renal sonogram on or about  week             Tl Arellano MD  Urology  2025          UROLOGY HISTORY AND PHYSICAL EXAM    Subjective:      Patient ID: Isaiah Neil is a 69 y.o. female.    Chief Complaint::   Follow-up  Pertinent negatives include no abdominal pain.     The patient is a 69-year-old female with a history of hypertension who presented with acute kidney injury and obstructive pyelonephritis requiring a ureteral stent.  She underwent subsequent ureteroscopy with laser lithotripsy in the stent has been removed and presents today for follow-up.  She denies any hematuria and reports that she is making changes to improve her intake of water.       Past Medical History:   Diagnosis Date    Digestive disorder     Hypertension     Mixed hyperlipidemia     Stroke     Thyroid disease      Past Surgical History:   Procedure Laterality Date     SECTION      CYSTOURETEROSCOPY, WITH HOLMIUM LASER LITHOTRIPSY OF URETERAL CALCULUS AND STENT INSERTION Left 3/9/2025    Procedure: CYSTOURETEROSCOPY, WITH HOLMIUM LASER LITHOTRIPSY OF URETERAL CALCULUS AND STENT INSERTION;  Surgeon: Tl Arellano MD;  Location: [facility];  Service: Urology;  Laterality: Left;    CYSTOURETEROSCOPY, WITH HOLMIUM LASER LITHOTRIPSY OF URETERAL CALCULUS AND STENT INSERTION Left 2025    Procedure: CYSTOURETEROSCOPY, WITH HOLMIUM LASER LITHOTRIPSY OF URETERAL CALCULUS AND STENT INSERTION;  Surgeon:  Tl Arellano MD;  Location: [facility] OR;  Service: Urology;  Laterality: Left;    HYSTERECTOMY      SHOULDER ARTHROSCOPY Right 5/3/2022    Procedure: ARTHROSCOPY, SHOULDER;  Surgeon: Davis Dunn MD;  Location: [facility] OR;  Service: Orthopedics;  Laterality: Right;        Medications Ordered Prior to Encounter[1]     Review of patient's allergies indicates:   Allergen Reactions    Hydrocodone     Azithromycin Nausea Only    Codeine phosphate Nausea Only    Sulfa (sulfonamide antibiotics) Nausea Only     Vitals:    06/11/25 1024   BP: (!) 162/79   Pulse: 95          Review of Systems   Constitutional:  Negative for activity change.   Gastrointestinal:  Negative for abdominal pain.   Genitourinary:  Negative for frequency and hematuria.      Objective:     Physical Exam  Vitals and nursing note reviewed.   Constitutional:       General: She is not in acute distress.     Appearance: She is well-developed. She is not diaphoretic.   HENT:      Head: Normocephalic.   Eyes:      Conjunctiva/sclera: Conjunctivae normal.   Cardiovascular:      Rate and Rhythm: Normal rate.   Pulmonary:      Effort: Pulmonary effort is normal. No respiratory distress.      Breath sounds: No wheezing.   Abdominal:      General: There is no distension.      Tenderness: There is no abdominal tenderness.   Skin:     General: Skin is warm.      Findings: No erythema or rash.   Neurological:      Mental Status: She is alert and oriented to person, place, and time.      Motor: No abnormal muscle tone.   Psychiatric:         Attention and Perception: Attention normal.         Mood and Affect: Affect normal.         Speech: Speech normal.         Behavior: Behavior normal.         Thought Content: Thought content normal.         Cognition and Memory: Cognition and memory normal.         Judgment: Judgment normal.            CMP  Sodium   Date Value Ref Range Status   04/28/2025 142 136 - 145 mmol/L Final     Potassium   Date Value  Ref Range Status   04/28/2025 3.7 3.5 - 5.1 mmol/L Final     Chloride   Date Value Ref Range Status   04/28/2025 107 98 - 107 mmol/L Final     CO2   Date Value Ref Range Status   04/28/2025 24 23 - 31 mmol/L Final     Glucose   Date Value Ref Range Status   04/28/2025 108 82 - 115 mg/dL Final     BUN   Date Value Ref Range Status   04/28/2025 14 10 - 20 mg/dL Final     Creatinine   Date Value Ref Range Status   04/28/2025 1.17 (H) 0.55 - 1.02 mg/dL Final     Calcium   Date Value Ref Range Status   04/28/2025 9.3 8.4 - 10.2 mg/dL Final     Total Protein   Date Value Ref Range Status   03/07/2025 8.2 (H) 5.8 - 7.6 g/dL Final     Albumin   Date Value Ref Range Status   03/07/2025 2.8 (L) 3.4 - 4.8 g/dL Final     Bilirubin, Total   Date Value Ref Range Status   03/07/2025 0.5 <=1.5 mg/dL Final     Alk Phos   Date Value Ref Range Status   03/07/2025 70 40 - 150 U/L Final     AST   Date Value Ref Range Status   03/07/2025 54 (H) 5 - 34 U/L Final     ALT   Date Value Ref Range Status   03/07/2025 37 <=55 U/L Final     Anion Gap   Date Value Ref Range Status   04/28/2025 15 7 - 16 mmol/L Final     eGFR   Date Value Ref Range Status   04/28/2025 51 (L) >=60 mL/min/1.73m2 Final     Comment:     Estimated GFR calculated using the CKD-EPI creatinine (2021) equation.      BMP  Lab Results   Component Value Date     04/28/2025    K 3.7 04/28/2025     04/28/2025    CO2 24 04/28/2025    BUN 14 04/28/2025    CREATININE 1.17 (H) 04/28/2025    CALCIUM 9.3 04/28/2025    ANIONGAP 15 04/28/2025    EGFRNORACEVR 51 (L) 04/28/2025              [1]   Current Outpatient Medications on File Prior to Visit   Medication Sig Dispense Refill    aspirin (ECOTRIN) 81 MG EC tablet Take 81 mg by mouth once daily.      atorvastatin (LIPITOR) 40 MG tablet Take 40 mg by mouth once daily.      carvediloL (COREG) 25 MG tablet Take 25 mg by mouth 2 (two) times daily.      EUTHYROX 112 mcg tablet Take 112 mcg by mouth before breakfast.       irbesartan (AVAPRO) 300 MG tablet Take 300 mg by mouth.      tolterodine (DETROL LA) 4 MG 24 hr capsule Take 1 capsule by mouth once every day to help with stent related urgency and frequency. 30 capsule 1    amLODIPine (NORVASC) 10 MG tablet Take 1 tablet (10 mg total) by mouth once daily. 30 tablet 0     Current Facility-Administered Medications on File Prior to Visit   Medication Dose Route Frequency Provider Last Rate Last Admin    acetaminophen tablet 1,000 mg  1,000 mg Oral Once Tl Arellano MD

## 2025-07-21 DIAGNOSIS — R82.991 HYPOCITRATURIA: Primary | ICD-10-CM

## 2025-07-21 RX ORDER — POTASSIUM CITRATE 1080 MG/1
10 TABLET, EXTENDED RELEASE ORAL
Qty: 90 TABLET | Refills: 11 | Status: SHIPPED | OUTPATIENT
Start: 2025-07-21 | End: 2026-07-21

## 2025-07-22 ENCOUNTER — TELEPHONE (OUTPATIENT)
Dept: UROLOGY | Facility: CLINIC | Age: 70
End: 2025-07-22
Payer: COMMERCIAL

## (undated) DEVICE — SYR 30CC LUER LOCK

## (undated) DEVICE — PROBE SUCTION 90-S CRUISE 4.X135MM

## (undated) DEVICE — TUBING ARTHRO STRYKER

## (undated) DEVICE — GOWN NONREINF SET-IN SLV 2XL

## (undated) DEVICE — COVER LIGHT HANDLE FLEX PK/2

## (undated) DEVICE — GUIDEWIRE ZIPWIRE STR .038 150

## (undated) DEVICE — SYRINGE 10-12CC LURE -LOK TIP

## (undated) DEVICE — GUIDE WIRE SENSOR .035 150

## (undated) DEVICE — SHEATH NAVIGATOR URETERAL

## (undated) DEVICE — CONNECTOR 6 IN 1 Y TUBING STRL

## (undated) DEVICE — Device

## (undated) DEVICE — URETEROSCOPE LITHOVUE REVERSE

## (undated) DEVICE — BUR CUTTER BARREL 5.5MM FORMULA

## (undated) DEVICE — IMP FIBERTAPE 2MM

## (undated) DEVICE — SOL NACL IRR 3000ML

## (undated) DEVICE — CANNULA TWIST-IN 8.25MMX7CM

## (undated) DEVICE — GLOVE SURGICAL PROTEXIS PI CLASSIC SIZE 6.5

## (undated) DEVICE — NEEDLE SCORPION PASSER (5/BX)

## (undated) DEVICE — SPONGE GAUZE 4X4 12 PLY STL AMD 10/TRAY

## (undated) DEVICE — CONTAINER SPECIMEN OR STER 4OZ

## (undated) DEVICE — GLOVE SURGICAL PROTEXIS PI BLUE SIZE 6.5

## (undated) DEVICE — SLING ARM VOGUE LARGE W/LOGO

## (undated) DEVICE — EXTRACTOR TIPLESS 2.4FRX1115CM

## (undated) DEVICE — SOL IRRIGATION SALINE 3000ML BAG

## (undated) DEVICE — GLOVE SURGICAL PROTEXIS PI BLUE SIZE 8.0

## (undated) DEVICE — POUCH TUR FLOW

## (undated) DEVICE — FIBER MOSES 365 DFL

## (undated) DEVICE — KIT LITHOTOMY RUSH

## (undated) DEVICE — FILM IOBAN ANTIMICROBL 35X35CM

## (undated) DEVICE — GRASPER MICRO 3PRNG 2.8F 115CM

## (undated) DEVICE — WATER BOOM FLOOR SUCTION STRIP

## (undated) DEVICE — APPLICATOR CHLORAPREP HI-LITE TINTED ORANGE 26ML

## (undated) DEVICE — GLOVE SENSICARE PI GRN 7

## (undated) DEVICE — GLOVE SENSICARE PI SURG 7.5

## (undated) DEVICE — CANISTER SUCTION 12000ML DISPOSABLE

## (undated) DEVICE — GLOVE SENSICARE PI GRN 6.5

## (undated) DEVICE — CUTTER TOMCAT 4X125MM

## (undated) DEVICE — ADAPTER TUOHY BORST 6FR

## (undated) DEVICE — SOL IRRI STRL WATER 1000ML

## (undated) DEVICE — TRAY CATH 1-LYR URIMTR 16FR

## (undated) DEVICE — BOWL STERILE LARGE 32OZ

## (undated) DEVICE — CATH URETERAL DUAL LUMEN 10FR

## (undated) DEVICE — GLOVE SENSICARE PI SURG 8

## (undated) DEVICE — GLOVE SURGICAL PROTEXIS PI CLASSIC SIZE 7.5

## (undated) DEVICE — WRAP ARM STERILE DISPOSABLE

## (undated) DEVICE — KIT IRRIGATION PUMP SGL ACTION

## (undated) DEVICE — SOL NORMAL USPCA 0.9%

## (undated) DEVICE — FIBER QUANTA OPT SDT 272UM

## (undated) DEVICE — CATH URTRL OPEN END STR TIP 5F